# Patient Record
Sex: FEMALE | Race: BLACK OR AFRICAN AMERICAN | NOT HISPANIC OR LATINO | Employment: OTHER
[De-identification: names, ages, dates, MRNs, and addresses within clinical notes are randomized per-mention and may not be internally consistent; named-entity substitution may affect disease eponyms.]

---

## 2017-01-01 ENCOUNTER — HOSPITAL (OUTPATIENT)
Dept: OTHER | Age: 67
End: 2017-01-01
Attending: INTERNAL MEDICINE

## 2017-01-04 ENCOUNTER — LAB SERVICES (OUTPATIENT)
Dept: OTHER | Age: 67
End: 2017-01-04

## 2017-01-04 ENCOUNTER — CHARTING TRANS (OUTPATIENT)
Dept: OTHER | Age: 67
End: 2017-01-04

## 2017-01-04 ASSESSMENT — PAIN SCALES - GENERAL: PAINLEVEL_OUTOF10: 0

## 2017-01-05 ENCOUNTER — CHARTING TRANS (OUTPATIENT)
Dept: OTHER | Age: 67
End: 2017-01-05

## 2017-01-05 LAB
ALBUMIN SERPL-MCNC: 3.7 G/DL (ref 3.6–5.1)
ALBUMIN/GLOB SERPL: 0.9 (ref 1–2.4)
ALP SERPL-CCNC: 81 UNITS/L (ref 45–117)
ALT SERPL-CCNC: 27 UNITS/L
ANION GAP SERPL CALC-SCNC: 12 MMOL/L (ref 10–20)
AST SERPL-CCNC: 26 UNITS/L
BASOPHILS # BLD: 0 K/MCL (ref 0–0.3)
BASOPHILS NFR BLD: 0 %
BILIRUB SERPL-MCNC: 0.5 MG/DL (ref 0.2–1)
BUN SERPL-MCNC: 15 MG/DL (ref 10–20)
BUN/CREAT SERPL: 9 (ref 7–25)
CALCIUM SERPL-MCNC: 9.3 MG/DL (ref 8.4–10.2)
CHLORIDE SERPL-SCNC: 105 MMOL/L (ref 98–107)
CHOLEST SERPL-MCNC: 159 MG/DL (ref 100–200)
CHOLEST/HDLC SERPL: 3.1
CO2 SERPL-SCNC: 28 MMOL/L (ref 21–32)
CREAT SERPL-MCNC: 1.64 MG/DL (ref 0.51–0.95)
CREATININE RANDOM URINE: 139 MG/DL
CRP SERPL-MCNC: <0.3 MG/DL
DIFFERENTIAL METHOD BLD: ABNORMAL
EOSINOPHIL # BLD: 0.3 K/MCL (ref 0.1–0.5)
EOSINOPHIL NFR BLD: 5 %
ERYTHROCYTE [DISTWIDTH] IN BLOOD: 14.8 % (ref 11–15)
ERYTHROCYTE [SEDIMENTATION RATE] IN BLOOD BY WESTERGREN METHOD: 29 MM/HR (ref 0–20)
GLOBULIN SER-MCNC: 4.2 G/DL (ref 2–4)
GLUCOSE SERPL-MCNC: 111 MG/DL (ref 65–99)
HBA1C MFR BLD: 6.9 % (ref 4.5–5.6)
HDLC SERPL-MCNC: 52 MG/DL
HEMATOCRIT: 43.5 % (ref 36–46.5)
HEMOGLOBIN: 13.9 G/DL (ref 12–15.5)
LDLC SERPL CALC-MCNC: 83 MG/DL
LENGTH OF FAST TIME PATIENT: ABNORMAL HRS
LENGTH OF FAST TIME PATIENT: ABNORMAL HRS
LYMPHOCYTES # BLD: 2.1 K/MCL (ref 1–4)
LYMPHOCYTES NFR BLD: 37 %
MEAN CORPUSCULAR HEMOGLOBIN: 30.7 PG (ref 26–34)
MEAN CORPUSCULAR HGB CONC: 32 G/DL (ref 32–36.5)
MEAN CORPUSCULAR VOLUME: 96 FL (ref 78–100)
MICROALBUMIN UR-MCNC: 1.72 MG/DL
MICROALBUMIN/CREAT UR: 12.4 MCG/MG
MONOCYTES # BLD: 0.5 K/MCL (ref 0.3–0.9)
MONOCYTES NFR BLD: 8 %
NEUTROPHILS # BLD: 2.9 K/MCL (ref 1.8–7.7)
NEUTROPHILS NFR BLD: 50 %
NONHDLC SERPL-MCNC: 107 MG/DL
PLATELET COUNT: 216 K/MCL (ref 140–450)
POTASSIUM SERPL-SCNC: 4 MMOL/L (ref 3.4–5.1)
RED CELL COUNT: 4.53 MIL/MCL (ref 4–5.2)
SODIUM SERPL-SCNC: 141 MMOL/L (ref 135–145)
TOTAL PROTEIN: 7.9 G/DL (ref 6.4–8.2)
TRIGL SERPL-MCNC: 122 MG/DL
TSH SERPL-ACNC: 2.78 MCUNITS/ML (ref 0.35–5)
WHITE BLOOD COUNT: 5.8 K/MCL (ref 4.2–11)

## 2017-02-06 ENCOUNTER — HOSPITAL (OUTPATIENT)
Dept: OTHER | Age: 67
End: 2017-02-06
Attending: INTERNAL MEDICINE

## 2017-02-08 ENCOUNTER — CHARTING TRANS (OUTPATIENT)
Dept: OTHER | Age: 67
End: 2017-02-08

## 2017-02-08 ASSESSMENT — PAIN SCALES - GENERAL: PAINLEVEL_OUTOF10: 5

## 2017-03-01 ENCOUNTER — HOSPITAL (OUTPATIENT)
Dept: OTHER | Age: 67
End: 2017-03-01
Attending: INTERNAL MEDICINE

## 2017-04-03 ENCOUNTER — HOSPITAL (OUTPATIENT)
Dept: OTHER | Age: 67
End: 2017-04-03
Attending: INTERNAL MEDICINE

## 2017-05-01 ENCOUNTER — HOSPITAL (OUTPATIENT)
Dept: OTHER | Age: 67
End: 2017-05-01
Attending: INTERNAL MEDICINE

## 2017-05-10 ENCOUNTER — CHARTING TRANS (OUTPATIENT)
Dept: OTHER | Age: 67
End: 2017-05-10

## 2017-05-10 ENCOUNTER — LAB SERVICES (OUTPATIENT)
Dept: OTHER | Age: 67
End: 2017-05-10

## 2017-05-11 ENCOUNTER — CHARTING TRANS (OUTPATIENT)
Dept: OTHER | Age: 67
End: 2017-05-11

## 2017-05-11 LAB
ALBUMIN SERPL-MCNC: 3.9 G/DL (ref 3.6–5.1)
ALBUMIN/GLOB SERPL: 1.1 (ref 1–2.4)
ALP SERPL-CCNC: 78 UNITS/L (ref 45–117)
ALT SERPL-CCNC: 26 UNITS/L
ANION GAP SERPL CALC-SCNC: 12 MMOL/L (ref 10–20)
AST SERPL-CCNC: 20 UNITS/L
BASOPHILS # BLD: 0 K/MCL (ref 0–0.3)
BASOPHILS NFR BLD: 1 %
BILIRUB SERPL-MCNC: 0.5 MG/DL (ref 0.2–1)
BUN SERPL-MCNC: 20 MG/DL (ref 6–20)
BUN/CREAT SERPL: 13 (ref 7–25)
CALCIUM SERPL-MCNC: 8.8 MG/DL (ref 8.4–10.2)
CHLORIDE SERPL-SCNC: 106 MMOL/L (ref 98–107)
CHOLEST SERPL-MCNC: 133 MG/DL
CHOLEST/HDLC SERPL: 2.7
CO2 SERPL-SCNC: 28 MMOL/L (ref 21–32)
CREAT SERPL-MCNC: 1.5 MG/DL (ref 0.51–0.95)
CRP SERPL-MCNC: <0.3 MG/DL
DIFFERENTIAL METHOD BLD: ABNORMAL
EOSINOPHIL # BLD: 0.3 K/MCL (ref 0.1–0.5)
EOSINOPHIL NFR BLD: 5 %
ERYTHROCYTE [DISTWIDTH] IN BLOOD: 15.2 % (ref 11–15)
ERYTHROCYTE [SEDIMENTATION RATE] IN BLOOD BY WESTERGREN METHOD: 34 MM/HR (ref 0–20)
GLOBULIN SER-MCNC: 3.6 G/DL (ref 2–4)
GLUCOSE SERPL-MCNC: 71 MG/DL (ref 65–99)
HBA1C MFR BLD: 6.9 % (ref 4.5–5.6)
HDLC SERPL-MCNC: 49 MG/DL
HEMATOCRIT: 43.1 % (ref 36–46.5)
HEMOGLOBIN: 13.8 G/DL (ref 12–15.5)
LDLC SERPL CALC-MCNC: 66 MG/DL
LENGTH OF FAST TIME PATIENT: ABNORMAL HRS
LENGTH OF FAST TIME PATIENT: ABNORMAL HRS
LYMPHOCYTES # BLD: 1.9 K/MCL (ref 1–4)
LYMPHOCYTES NFR BLD: 35 %
MEAN CORPUSCULAR HEMOGLOBIN: 30.3 PG (ref 26–34)
MEAN CORPUSCULAR HGB CONC: 32 G/DL (ref 32–36.5)
MEAN CORPUSCULAR VOLUME: 94.5 FL (ref 78–100)
MONOCYTES # BLD: 0.8 K/MCL (ref 0.3–0.9)
MONOCYTES NFR BLD: 14 %
NEUTROPHILS # BLD: 2.5 K/MCL (ref 1.8–7.7)
NEUTROPHILS NFR BLD: 45 %
NONHDLC SERPL-MCNC: 84 MG/DL
PLATELET COUNT: 226 K/MCL (ref 140–450)
POTASSIUM SERPL-SCNC: 4.2 MMOL/L (ref 3.4–5.1)
RED CELL COUNT: 4.56 MIL/MCL (ref 4–5.2)
SODIUM SERPL-SCNC: 142 MMOL/L (ref 135–145)
TOTAL PROTEIN: 7.5 G/DL (ref 6.4–8.2)
TRIGL SERPL-MCNC: 92 MG/DL
TSH SERPL-ACNC: 2.67 MCUNITS/ML (ref 0.35–5)
WHITE BLOOD COUNT: 5.4 K/MCL (ref 4.2–11)

## 2017-07-25 ENCOUNTER — HOSPITAL (OUTPATIENT)
Dept: OTHER | Age: 67
End: 2017-07-25
Attending: INTERNAL MEDICINE

## 2017-09-19 ENCOUNTER — HOSPITAL (OUTPATIENT)
Dept: OTHER | Age: 67
End: 2017-09-19
Attending: INTERNAL MEDICINE

## 2017-10-04 ENCOUNTER — LAB SERVICES (OUTPATIENT)
Dept: OTHER | Age: 67
End: 2017-10-04

## 2017-10-04 ENCOUNTER — CHARTING TRANS (OUTPATIENT)
Dept: OTHER | Age: 67
End: 2017-10-04

## 2017-10-04 ASSESSMENT — PAIN SCALES - GENERAL: PAINLEVEL_OUTOF10: 0

## 2017-10-05 ENCOUNTER — CHARTING TRANS (OUTPATIENT)
Dept: OTHER | Age: 67
End: 2017-10-05

## 2017-10-05 LAB
ALBUMIN SERPL-MCNC: 3.7 G/DL (ref 3.6–5.1)
ALBUMIN/GLOB SERPL: 0.9 (ref 1–2.4)
ALP SERPL-CCNC: 84 UNITS/L (ref 45–117)
ALT SERPL-CCNC: 26 UNITS/L
ANION GAP SERPL CALC-SCNC: 11 MMOL/L (ref 10–20)
AST SERPL-CCNC: 17 UNITS/L
BASOPHILS # BLD: 0 K/MCL (ref 0–0.3)
BASOPHILS NFR BLD: 1 %
BILIRUB SERPL-MCNC: 0.5 MG/DL (ref 0.2–1)
BUN SERPL-MCNC: 21 MG/DL (ref 6–20)
BUN/CREAT SERPL: 14 (ref 7–25)
CALCIUM SERPL-MCNC: 8.7 MG/DL (ref 8.4–10.2)
CHLORIDE SERPL-SCNC: 106 MMOL/L (ref 98–107)
CHOLEST SERPL-MCNC: 137 MG/DL
CHOLEST/HDLC SERPL: 2.7
CO2 SERPL-SCNC: 29 MMOL/L (ref 21–32)
CREAT SERPL-MCNC: 1.56 MG/DL (ref 0.51–0.95)
CREATININE RANDOM URINE: 60.7 MG/DL
CRP SERPL-MCNC: <0.3 MG/DL
DIFFERENTIAL METHOD BLD: ABNORMAL
EOSINOPHIL # BLD: 0.3 K/MCL (ref 0.1–0.5)
EOSINOPHIL NFR BLD: 4 %
ERYTHROCYTE [DISTWIDTH] IN BLOOD: 15 % (ref 11–15)
ERYTHROCYTE [SEDIMENTATION RATE] IN BLOOD BY WESTERGREN METHOD: 35 MM/HR (ref 0–20)
GLOBULIN SER-MCNC: 4.1 G/DL (ref 2–4)
GLUCOSE SERPL-MCNC: 84 MG/DL (ref 65–99)
HBA1C MFR BLD: 6.7 % (ref 4.5–5.6)
HDLC SERPL-MCNC: 51 MG/DL
HEMATOCRIT: 43.3 % (ref 36–46.5)
HEMOGLOBIN: 14 G/DL (ref 12–15.5)
LDLC SERPL CALC-MCNC: 71 MG/DL
LENGTH OF FAST TIME PATIENT: ABNORMAL HRS
LENGTH OF FAST TIME PATIENT: ABNORMAL HRS
LYMPHOCYTES # BLD: 2.9 K/MCL (ref 1–4)
LYMPHOCYTES NFR BLD: 44 %
MEAN CORPUSCULAR HEMOGLOBIN: 30.6 PG (ref 26–34)
MEAN CORPUSCULAR HGB CONC: 32.3 G/DL (ref 32–36.5)
MEAN CORPUSCULAR VOLUME: 94.5 FL (ref 78–100)
MICROALBUMIN UR-MCNC: <0.5 MG/DL
MICROALBUMIN/CREAT UR: NORMAL MCG/MG
MONOCYTES # BLD: 0.5 K/MCL (ref 0.3–0.9)
MONOCYTES NFR BLD: 8 %
NEUTROPHILS # BLD: 2.9 K/MCL (ref 1.8–7.7)
NEUTROPHILS NFR BLD: 43 %
NONHDLC SERPL-MCNC: 86 MG/DL
PLATELET COUNT: 204 K/MCL (ref 140–450)
POTASSIUM SERPL-SCNC: 3.8 MMOL/L (ref 3.4–5.1)
RED CELL COUNT: 4.58 MIL/MCL (ref 4–5.2)
SODIUM SERPL-SCNC: 142 MMOL/L (ref 135–145)
TOTAL PROTEIN: 7.8 G/DL (ref 6.4–8.2)
TRIGL SERPL-MCNC: 74 MG/DL
TSH SERPL-ACNC: 2.25 MCUNITS/ML (ref 0.35–5)
WHITE BLOOD COUNT: 6.6 K/MCL (ref 4.2–11)

## 2017-11-14 ENCOUNTER — HOSPITAL (OUTPATIENT)
Dept: OTHER | Age: 67
End: 2017-11-14
Attending: INTERNAL MEDICINE

## 2017-11-15 ENCOUNTER — CHARTING TRANS (OUTPATIENT)
Dept: OTHER | Age: 67
End: 2017-11-15

## 2017-12-13 ENCOUNTER — OFFICE VISIT (OUTPATIENT)
Dept: NEPHROLOGY | Facility: CLINIC | Age: 67
End: 2017-12-13

## 2017-12-13 VITALS
HEIGHT: 69 IN | DIASTOLIC BLOOD PRESSURE: 66 MMHG | WEIGHT: 293 LBS | BODY MASS INDEX: 43.4 KG/M2 | HEART RATE: 80 BPM | SYSTOLIC BLOOD PRESSURE: 122 MMHG

## 2017-12-13 DIAGNOSIS — N18.30 CKD (CHRONIC KIDNEY DISEASE) STAGE 3, GFR 30-59 ML/MIN (HCC): Primary | ICD-10-CM

## 2017-12-13 PROCEDURE — 99212 OFFICE O/P EST SF 10 MIN: CPT | Performed by: INTERNAL MEDICINE

## 2017-12-13 PROCEDURE — 99245 OFF/OP CONSLTJ NEW/EST HI 55: CPT | Performed by: INTERNAL MEDICINE

## 2017-12-13 RX ORDER — MELATONIN
325 WEEKLY
COMMUNITY
End: 2021-11-19 | Stop reason: ALTCHOICE

## 2017-12-13 RX ORDER — FOLIC ACID 1 MG/1
1 TABLET ORAL DAILY
COMMUNITY
Start: 2017-11-19

## 2017-12-13 RX ORDER — DULAGLUTIDE 1.5 MG/.5ML
3 INJECTION, SOLUTION SUBCUTANEOUS WEEKLY
Refills: 3 | COMMUNITY
Start: 2017-11-16

## 2017-12-13 RX ORDER — INSULIN DEGLUDEC 200 U/ML
300 INJECTION, SOLUTION SUBCUTANEOUS DAILY
COMMUNITY
Start: 2017-09-27

## 2017-12-13 RX ORDER — FUROSEMIDE 20 MG/1
TABLET ORAL
Refills: 0 | COMMUNITY
Start: 2017-10-20

## 2017-12-13 RX ORDER — VALSARTAN AND HYDROCHLOROTHIAZIDE 320; 12.5 MG/1; MG/1
TABLET, FILM COATED ORAL
Refills: 0 | COMMUNITY
Start: 2017-10-20

## 2017-12-13 RX ORDER — LANCETS
EACH MISCELLANEOUS
COMMUNITY
Start: 2017-10-04

## 2017-12-13 RX ORDER — GLIMEPIRIDE 4 MG/1
TABLET ORAL
COMMUNITY
Start: 2017-11-20

## 2017-12-13 RX ORDER — METOPROLOL SUCCINATE 50 MG/1
75 TABLET, EXTENDED RELEASE ORAL DAILY
COMMUNITY
Start: 2017-11-10

## 2017-12-13 RX ORDER — OMEPRAZOLE 20 MG/1
20 CAPSULE, DELAYED RELEASE ORAL
COMMUNITY
End: 2021-11-19

## 2017-12-13 RX ORDER — SIMVASTATIN 40 MG
40 TABLET ORAL NIGHTLY
COMMUNITY
Start: 2017-09-26 | End: 2021-11-19 | Stop reason: ALTCHOICE

## 2017-12-13 RX ORDER — TERAZOSIN 10 MG/1
10 CAPSULE ORAL NIGHTLY
COMMUNITY
Start: 2017-11-10

## 2017-12-13 RX ORDER — DOXEPIN HYDROCHLORIDE 50 MG/G
CREAM TOPICAL
COMMUNITY
Start: 2017-11-30

## 2017-12-13 RX ORDER — AMOXICILLIN 500 MG/1
4 CAPSULE ORAL AS NEEDED
COMMUNITY
Start: 2017-09-27

## 2017-12-15 PROBLEM — N18.30 CKD (CHRONIC KIDNEY DISEASE) STAGE 3, GFR 30-59 ML/MIN (HCC): Status: ACTIVE | Noted: 2017-12-15

## 2017-12-16 NOTE — PROGRESS NOTES
12/15/17        Patient: Carl Levi   YOB: 1950   Date of Visit: 12/13/2017       Dear  Dr. Kriss Paz,      Thank you for referring Carl Levi to my practice. Please find my assessment and plan below.       He has you know she is a 67-yea urine microalbumin to creatinine ratio was normal as well. The patient is a 82-year-old female who appears to have chronic kidney disease stage III.   She does not have proteinuria and therefore suspect that hypertension and nephrosclerosis are the cause

## 2017-12-20 ENCOUNTER — LAB ENCOUNTER (OUTPATIENT)
Dept: LAB | Facility: HOSPITAL | Age: 67
End: 2017-12-20
Attending: INTERNAL MEDICINE
Payer: COMMERCIAL

## 2017-12-20 ENCOUNTER — HOSPITAL ENCOUNTER (OUTPATIENT)
Dept: ULTRASOUND IMAGING | Facility: HOSPITAL | Age: 67
Discharge: HOME | End: 2017-12-20
Attending: INTERNAL MEDICINE
Payer: COMMERCIAL

## 2017-12-20 DIAGNOSIS — N18.30 CKD (CHRONIC KIDNEY DISEASE) STAGE 3, GFR 30-59 ML/MIN (HCC): ICD-10-CM

## 2017-12-20 PROCEDURE — 84166 PROTEIN E-PHORESIS/URINE/CSF: CPT

## 2017-12-20 PROCEDURE — 76770 US EXAM ABDO BACK WALL COMP: CPT | Performed by: INTERNAL MEDICINE

## 2017-12-20 PROCEDURE — 82570 ASSAY OF URINE CREATININE: CPT

## 2017-12-20 PROCEDURE — 36415 COLL VENOUS BLD VENIPUNCTURE: CPT

## 2017-12-20 PROCEDURE — 85025 COMPLETE CBC W/AUTO DIFF WBC: CPT

## 2017-12-20 PROCEDURE — 82043 UR ALBUMIN QUANTITATIVE: CPT

## 2017-12-20 PROCEDURE — 86335 IMMUNFIX E-PHORSIS/URINE/CSF: CPT

## 2017-12-20 PROCEDURE — 81001 URINALYSIS AUTO W/SCOPE: CPT

## 2017-12-20 PROCEDURE — 84156 ASSAY OF PROTEIN URINE: CPT

## 2017-12-22 ENCOUNTER — APPOINTMENT (OUTPATIENT)
Dept: LAB | Facility: HOSPITAL | Age: 67
End: 2017-12-22
Attending: INTERNAL MEDICINE
Payer: COMMERCIAL

## 2017-12-22 PROCEDURE — 36415 COLL VENOUS BLD VENIPUNCTURE: CPT

## 2017-12-22 PROCEDURE — 80069 RENAL FUNCTION PANEL: CPT

## 2017-12-23 ENCOUNTER — TELEPHONE (OUTPATIENT)
Dept: NEPHROLOGY | Facility: CLINIC | Age: 67
End: 2017-12-23

## 2017-12-23 DIAGNOSIS — N18.30 CHRONIC KIDNEY DISEASE, STAGE III (MODERATE) (HCC): Primary | ICD-10-CM

## 2017-12-26 NOTE — TELEPHONE ENCOUNTER
Contacted pt. Notified her that [facility] received labs and ultrasound results and would like to see her soon to review. Pt states she will get her schedule and call our office back to schedule an appt.

## 2017-12-28 NOTE — TELEPHONE ENCOUNTER
Contacted pt. States it's difficult for her to arrange rides and she rather not have to come in just for lab results. Notified her that her kidney function is stable but it is abnormal, which is secondary to HTN.  Explained this will require regular follow

## 2017-12-28 NOTE — TELEPHONE ENCOUNTER
Why?  Ideally should come in. If she cannot let her know her kidney function is stable but abnormal.  This is secondary to hypertension. This needs to be followed on a regular basis. Repeat CBC and renal panel in 3 months and then see.   Make this a don

## 2018-01-09 ENCOUNTER — HOSPITAL (OUTPATIENT)
Dept: OTHER | Age: 68
End: 2018-01-09
Attending: INTERNAL MEDICINE

## 2018-03-01 ENCOUNTER — HOSPITAL (OUTPATIENT)
Dept: OTHER | Age: 68
End: 2018-03-01
Attending: INTERNAL MEDICINE

## 2018-03-07 ENCOUNTER — CHARTING TRANS (OUTPATIENT)
Dept: OTHER | Age: 68
End: 2018-03-07

## 2018-03-07 ENCOUNTER — IMAGING SERVICES (OUTPATIENT)
Dept: OTHER | Age: 68
End: 2018-03-07

## 2018-03-07 ENCOUNTER — HOSPITAL (OUTPATIENT)
Dept: OTHER | Age: 68
End: 2018-03-07
Attending: INTERNAL MEDICINE

## 2018-03-07 ASSESSMENT — PAIN SCALES - GENERAL: PAINLEVEL_OUTOF10: 8

## 2018-04-25 ENCOUNTER — MYAURORA ACCOUNT LINK (OUTPATIENT)
Dept: OTHER | Age: 68
End: 2018-04-25

## 2018-04-25 ENCOUNTER — LAB SERVICES (OUTPATIENT)
Dept: OTHER | Age: 68
End: 2018-04-25

## 2018-04-25 ENCOUNTER — CHARTING TRANS (OUTPATIENT)
Dept: OTHER | Age: 68
End: 2018-04-25

## 2018-04-25 ASSESSMENT — PAIN SCALES - GENERAL: PAINLEVEL_OUTOF10: 7

## 2018-04-26 ENCOUNTER — CHARTING TRANS (OUTPATIENT)
Dept: OTHER | Age: 68
End: 2018-04-26

## 2018-04-26 LAB
ALBUMIN SERPL-MCNC: 3.8 G/DL (ref 3.6–5.1)
ALBUMIN/GLOB SERPL: 0.9 (ref 1–2.4)
ALP SERPL-CCNC: 90 UNITS/L (ref 45–117)
ALT SERPL-CCNC: 30 UNITS/L
ANION GAP SERPL CALC-SCNC: 16 MMOL/L (ref 10–20)
AST SERPL-CCNC: 26 UNITS/L
BASOPHILS # BLD: 0.1 K/MCL (ref 0–0.3)
BASOPHILS NFR BLD: 1 %
BILIRUB SERPL-MCNC: 0.5 MG/DL (ref 0.2–1)
BUN SERPL-MCNC: 18 MG/DL (ref 6–20)
BUN/CREAT SERPL: 12 (ref 7–25)
CALCIUM SERPL-MCNC: 8.7 MG/DL (ref 8.4–10.2)
CHLORIDE SERPL-SCNC: 105 MMOL/L (ref 98–107)
CHOLEST SERPL-MCNC: 134 MG/DL
CHOLEST/HDLC SERPL: 2.5
CO2 SERPL-SCNC: 27 MMOL/L (ref 21–32)
CREAT SERPL-MCNC: 1.56 MG/DL (ref 0.51–0.95)
CRP SERPL-MCNC: 0.5 MG/DL
DIFFERENTIAL METHOD BLD: ABNORMAL
EOSINOPHIL # BLD: 0.2 K/MCL (ref 0.1–0.5)
EOSINOPHIL NFR BLD: 4 %
ERYTHROCYTE [DISTWIDTH] IN BLOOD: 14.2 % (ref 11–15)
ERYTHROCYTE [SEDIMENTATION RATE] IN BLOOD BY WESTERGREN METHOD: 35 MM/HR (ref 0–20)
GLOBULIN SER-MCNC: 4.4 G/DL (ref 2–4)
GLUCOSE SERPL-MCNC: 101 MG/DL (ref 65–99)
HBA1C MFR BLD: 7.3 % (ref 4.5–5.6)
HDLC SERPL-MCNC: 53 MG/DL
HEMATOCRIT: 44.8 % (ref 36–46.5)
HEMOGLOBIN: 14.1 G/DL (ref 12–15.5)
IMM GRANULOCYTES # BLD AUTO: 0 K/MCL (ref 0–0.2)
IMM GRANULOCYTES NFR BLD: 0 %
LDLC SERPL CALC-MCNC: 63 MG/DL
LENGTH OF FAST TIME PATIENT: 0 HRS
LENGTH OF FAST TIME PATIENT: 0 HRS
LYMPHOCYTES # BLD: 1.9 K/MCL (ref 1–4)
LYMPHOCYTES NFR BLD: 35 %
MEAN CORPUSCULAR HEMOGLOBIN: 30.2 PG (ref 26–34)
MEAN CORPUSCULAR HGB CONC: 31.5 G/DL (ref 32–36.5)
MEAN CORPUSCULAR VOLUME: 95.9 FL (ref 78–100)
MONOCYTES # BLD: 0.6 K/MCL (ref 0.3–0.9)
MONOCYTES NFR BLD: 11 %
NEUTROPHILS # BLD: 2.7 K/MCL (ref 1.8–7.7)
NEUTROPHILS NFR BLD: 49 %
NONHDLC SERPL-MCNC: 81 MG/DL
NRBC (NRBCRE): 0 %
PLATELET COUNT: 199 K/MCL (ref 140–450)
POTASSIUM SERPL-SCNC: 4 MMOL/L (ref 3.4–5.1)
RED CELL COUNT: 4.67 MIL/MCL (ref 4–5.2)
SODIUM SERPL-SCNC: 144 MMOL/L (ref 135–145)
TOTAL PROTEIN: 8.2 G/DL (ref 6.4–8.2)
TRIGL SERPL-MCNC: 89 MG/DL
TSH SERPL-ACNC: 2.53 MCUNITS/ML (ref 0.35–5)
WHITE BLOOD COUNT: 5.4 K/MCL (ref 4.2–11)

## 2018-05-01 ENCOUNTER — HOSPITAL (OUTPATIENT)
Dept: OTHER | Age: 68
End: 2018-05-01
Attending: INTERNAL MEDICINE

## 2018-05-02 ENCOUNTER — CHARTING TRANS (OUTPATIENT)
Dept: OTHER | Age: 68
End: 2018-05-02

## 2018-05-02 ASSESSMENT — PAIN SCALES - GENERAL: PAINLEVEL_OUTOF10: 0

## 2018-05-09 ENCOUNTER — TELEPHONE (OUTPATIENT)
Dept: NEPHROLOGY | Facility: CLINIC | Age: 68
End: 2018-05-09

## 2018-05-09 DIAGNOSIS — N18.30 CHRONIC KIDNEY DISEASE, STAGE III (MODERATE) (HCC): Primary | ICD-10-CM

## 2018-05-09 NOTE — TELEPHONE ENCOUNTER
Contacted pt about labs from 4/25/18 that were faxed to [address]. Per [facility]: Labs show kidney function is stable. CPM. Repeat CBC and renal panel in 3 months. Patient notified. She uses an outside lab so I have entered renal panel as BMP and phosphorus.  Pt would

## 2018-05-09 NOTE — TELEPHONE ENCOUNTER
Patient contacted and advised to see Dr. Nora Shi 1-2 weeks before her knee surgery. Advised to call this office to schedule a Preop visit once she has a surgery date set up.

## 2018-06-01 ENCOUNTER — HOSPITAL (OUTPATIENT)
Dept: OTHER | Age: 68
End: 2018-06-01
Attending: INTERNAL MEDICINE

## 2018-08-10 ENCOUNTER — HOSPITAL (OUTPATIENT)
Dept: OTHER | Age: 68
End: 2018-08-10
Attending: INTERNAL MEDICINE

## 2018-08-22 ENCOUNTER — MYAURORA ACCOUNT LINK (OUTPATIENT)
Dept: OTHER | Age: 68
End: 2018-08-22

## 2018-08-22 ENCOUNTER — CHARTING TRANS (OUTPATIENT)
Dept: OTHER | Age: 68
End: 2018-08-22

## 2018-10-05 ENCOUNTER — CHARTING TRANS (OUTPATIENT)
Dept: OTHER | Age: 68
End: 2018-10-05

## 2018-10-11 ENCOUNTER — HOSPITAL (OUTPATIENT)
Dept: OTHER | Age: 68
End: 2018-10-11
Attending: INTERNAL MEDICINE

## 2018-11-01 VITALS
HEART RATE: 78 BPM | BODY MASS INDEX: 44.41 KG/M2 | TEMPERATURE: 97.2 F | OXYGEN SATURATION: 98 % | SYSTOLIC BLOOD PRESSURE: 136 MMHG | DIASTOLIC BLOOD PRESSURE: 74 MMHG | WEIGHT: 293 LBS | HEIGHT: 68 IN

## 2018-11-01 VITALS
SYSTOLIC BLOOD PRESSURE: 124 MMHG | RESPIRATION RATE: 18 BRPM | WEIGHT: 293 LBS | TEMPERATURE: 96.9 F | HEIGHT: 68 IN | HEART RATE: 76 BPM | BODY MASS INDEX: 44.41 KG/M2 | DIASTOLIC BLOOD PRESSURE: 80 MMHG

## 2018-11-01 VITALS
WEIGHT: 293 LBS | DIASTOLIC BLOOD PRESSURE: 76 MMHG | SYSTOLIC BLOOD PRESSURE: 118 MMHG | TEMPERATURE: 97.2 F | HEIGHT: 68 IN | BODY MASS INDEX: 44.41 KG/M2 | HEART RATE: 102 BPM

## 2018-11-02 VITALS
HEIGHT: 68 IN | BODY MASS INDEX: 44.41 KG/M2 | RESPIRATION RATE: 18 BRPM | DIASTOLIC BLOOD PRESSURE: 90 MMHG | HEART RATE: 78 BPM | WEIGHT: 293 LBS | SYSTOLIC BLOOD PRESSURE: 138 MMHG

## 2018-11-02 VITALS
DIASTOLIC BLOOD PRESSURE: 68 MMHG | BODY MASS INDEX: 44.41 KG/M2 | HEART RATE: 90 BPM | OXYGEN SATURATION: 97 % | HEIGHT: 68 IN | WEIGHT: 293 LBS | TEMPERATURE: 98.1 F | SYSTOLIC BLOOD PRESSURE: 108 MMHG

## 2018-11-03 VITALS
HEIGHT: 68 IN | HEART RATE: 80 BPM | BODY MASS INDEX: 44.41 KG/M2 | DIASTOLIC BLOOD PRESSURE: 78 MMHG | WEIGHT: 293 LBS | RESPIRATION RATE: 16 BRPM | SYSTOLIC BLOOD PRESSURE: 124 MMHG

## 2018-11-05 VITALS
SYSTOLIC BLOOD PRESSURE: 136 MMHG | HEART RATE: 37 BPM | HEIGHT: 68 IN | BODY MASS INDEX: 44.41 KG/M2 | WEIGHT: 293 LBS | DIASTOLIC BLOOD PRESSURE: 77 MMHG | TEMPERATURE: 99.1 F

## 2018-11-06 VITALS
DIASTOLIC BLOOD PRESSURE: 80 MMHG | WEIGHT: 293 LBS | HEIGHT: 68 IN | BODY MASS INDEX: 44.41 KG/M2 | TEMPERATURE: 96.2 F | OXYGEN SATURATION: 97 % | HEART RATE: 104 BPM | SYSTOLIC BLOOD PRESSURE: 124 MMHG

## 2018-11-07 ENCOUNTER — LAB SERVICES (OUTPATIENT)
Dept: OTHER | Age: 68
End: 2018-11-07

## 2018-11-07 ENCOUNTER — CHARTING TRANS (OUTPATIENT)
Dept: OTHER | Age: 68
End: 2018-11-07

## 2018-11-08 ENCOUNTER — CHARTING TRANS (OUTPATIENT)
Dept: OTHER | Age: 68
End: 2018-11-08

## 2018-11-08 LAB — HEMOCCULT STL QL: NEGATIVE

## 2018-11-27 VITALS
HEART RATE: 100 BPM | TEMPERATURE: 97.9 F | BODY MASS INDEX: 44.41 KG/M2 | HEIGHT: 68 IN | WEIGHT: 293 LBS | DIASTOLIC BLOOD PRESSURE: 80 MMHG | SYSTOLIC BLOOD PRESSURE: 126 MMHG

## 2018-12-01 ENCOUNTER — PRIOR ORIGINAL RECORDS (OUTPATIENT)
Dept: HEALTH INFORMATION MANAGEMENT | Facility: OTHER | Age: 68
End: 2018-12-01

## 2018-12-10 ENCOUNTER — HOSPITAL (OUTPATIENT)
Dept: OTHER | Age: 68
End: 2018-12-10
Attending: INTERNAL MEDICINE

## 2018-12-11 ENCOUNTER — OFFICE VISIT (OUTPATIENT)
Dept: NEPHROLOGY | Facility: CLINIC | Age: 68
End: 2018-12-11
Payer: COMMERCIAL

## 2018-12-11 VITALS
BODY MASS INDEX: 43.4 KG/M2 | WEIGHT: 293 LBS | HEIGHT: 69 IN | SYSTOLIC BLOOD PRESSURE: 113 MMHG | DIASTOLIC BLOOD PRESSURE: 75 MMHG | HEART RATE: 92 BPM

## 2018-12-11 DIAGNOSIS — N18.30 CHRONIC KIDNEY DISEASE, STAGE III (MODERATE) (HCC): Primary | ICD-10-CM

## 2018-12-11 PROCEDURE — 99214 OFFICE O/P EST MOD 30 MIN: CPT | Performed by: INTERNAL MEDICINE

## 2018-12-11 PROCEDURE — 99212 OFFICE O/P EST SF 10 MIN: CPT | Performed by: INTERNAL MEDICINE

## 2018-12-12 ENCOUNTER — APPOINTMENT (OUTPATIENT)
Dept: RHEUMATOLOGY | Age: 68
End: 2018-12-12

## 2018-12-12 NOTE — PROGRESS NOTES
12/11/18        Patient: Mitali Salas   YOB: 1950   Date of Visit: 12/11/2018       Dear  Dr. Marciano Cruz,      Thank you for referring Mitali Salas to my practice. Please find my assessment and plan below.       As you know she is a 68-year-ol institution. Names and phone numbers were given. Thank you again for allowing me to participate in the care of your patient. If you have any questions please feel free to call.            Sincerely,   Sven Kovacs MD   [address],

## 2018-12-12 NOTE — PATIENT INSTRUCTIONS
Please repeat your kidney blood test this month. Orders are in the computer. Establish yourself with a new primary care physician and rheumatology.

## 2018-12-21 ENCOUNTER — TELEPHONE (OUTPATIENT)
Dept: SCHEDULING | Age: 68
End: 2018-12-21

## 2018-12-21 RX ORDER — SIMVASTATIN 40 MG
40 TABLET ORAL NIGHTLY
Qty: 90 TABLET | Refills: 0 | Status: SHIPPED | OUTPATIENT
Start: 2018-12-21 | End: 2019-03-22 | Stop reason: SDUPTHER

## 2018-12-21 RX ORDER — SIMVASTATIN 40 MG
40 TABLET ORAL
COMMUNITY
Start: 2017-09-26 | End: 2018-12-21 | Stop reason: SDUPTHER

## 2018-12-28 ENCOUNTER — LAB ENCOUNTER (OUTPATIENT)
Dept: LAB | Facility: HOSPITAL | Age: 68
End: 2018-12-28
Attending: INTERNAL MEDICINE
Payer: COMMERCIAL

## 2018-12-28 DIAGNOSIS — E11.65 DIABETES MELLITUS WITH NEUROLOGICAL MANIFESTATIONS, UNCONTROLLED (HCC): Primary | ICD-10-CM

## 2018-12-28 DIAGNOSIS — N18.30 CHRONIC KIDNEY DISEASE, STAGE III (MODERATE) (HCC): ICD-10-CM

## 2018-12-28 DIAGNOSIS — E11.49 DIABETES MELLITUS WITH NEUROLOGICAL MANIFESTATIONS, UNCONTROLLED (HCC): Primary | ICD-10-CM

## 2018-12-28 PROCEDURE — 83036 HEMOGLOBIN GLYCOSYLATED A1C: CPT

## 2018-12-28 PROCEDURE — 85025 COMPLETE CBC W/AUTO DIFF WBC: CPT

## 2018-12-28 PROCEDURE — 36415 COLL VENOUS BLD VENIPUNCTURE: CPT

## 2018-12-28 PROCEDURE — 80069 RENAL FUNCTION PANEL: CPT

## 2018-12-31 ENCOUNTER — TELEPHONE (OUTPATIENT)
Dept: INTERNAL MEDICINE | Age: 68
End: 2018-12-31

## 2018-12-31 ENCOUNTER — TELEPHONE (OUTPATIENT)
Dept: SCHEDULING | Age: 68
End: 2018-12-31

## 2018-12-31 RX ORDER — PEN NEEDLE, DIABETIC 32GX 5/32"
NEEDLE, DISPOSABLE MISCELLANEOUS
Qty: 200 EACH | Refills: 0 | Status: SHIPPED | OUTPATIENT
Start: 2018-12-31 | End: 2018-12-31 | Stop reason: SDUPTHER

## 2019-01-01 ENCOUNTER — EXTERNAL RECORD (OUTPATIENT)
Dept: HEALTH INFORMATION MANAGEMENT | Facility: OTHER | Age: 69
End: 2019-01-01

## 2019-01-01 ENCOUNTER — HOSPITAL (OUTPATIENT)
Dept: OTHER | Age: 69
End: 2019-01-01
Attending: INTERNAL MEDICINE

## 2019-01-22 RX ORDER — VALSARTAN AND HYDROCHLOROTHIAZIDE 320; 12.5 MG/1; MG/1
1 TABLET, FILM COATED ORAL DAILY
COMMUNITY
End: 2019-02-04 | Stop reason: SDUPTHER

## 2019-01-22 RX ORDER — METHOTREXATE 2.5 MG/1
TABLET ORAL
COMMUNITY
End: 2019-01-23 | Stop reason: SDUPTHER

## 2019-01-22 RX ORDER — FUROSEMIDE 20 MG/1
TABLET ORAL
COMMUNITY
End: 2019-03-22 | Stop reason: SDUPTHER

## 2019-01-22 RX ORDER — TERAZOSIN 10 MG/1
CAPSULE ORAL
COMMUNITY
End: 2019-02-03 | Stop reason: SDUPTHER

## 2019-01-22 RX ORDER — METOPROLOL SUCCINATE 50 MG/1
TABLET, EXTENDED RELEASE ORAL
COMMUNITY
End: 2019-02-03 | Stop reason: SDUPTHER

## 2019-01-22 RX ORDER — GLIMEPIRIDE 4 MG/1
4 TABLET ORAL
COMMUNITY

## 2019-01-22 RX ORDER — LANCETS
EACH MISCELLANEOUS
COMMUNITY
End: 2020-02-18 | Stop reason: SDUPTHER

## 2019-01-22 RX ORDER — OMEPRAZOLE 20 MG/1
CAPSULE, DELAYED RELEASE ORAL
COMMUNITY
End: 2019-02-03 | Stop reason: SDUPTHER

## 2019-01-22 RX ORDER — FOLIC ACID 1 MG/1
TABLET ORAL
COMMUNITY
End: 2019-02-03 | Stop reason: SDUPTHER

## 2019-01-23 RX ORDER — METHOTREXATE 2.5 MG/1
TABLET ORAL
Qty: 72 TABLET | Refills: 0 | Status: SHIPPED | OUTPATIENT
Start: 2019-01-23 | End: 2019-05-29 | Stop reason: SDUPTHER

## 2019-01-24 RX ORDER — FUROSEMIDE 20 MG/1
TABLET ORAL
Qty: 90 TABLET | Refills: 0 | OUTPATIENT
Start: 2019-01-24

## 2019-02-04 ENCOUNTER — HOSPITAL (OUTPATIENT)
Dept: OTHER | Age: 69
End: 2019-02-04
Attending: INTERNAL MEDICINE

## 2019-02-04 RX ORDER — OMEPRAZOLE 20 MG/1
CAPSULE, DELAYED RELEASE ORAL
Qty: 30 CAPSULE | Refills: 0 | Status: SHIPPED | OUTPATIENT
Start: 2019-02-04 | End: 2019-06-19 | Stop reason: SDUPTHER

## 2019-02-04 RX ORDER — METOPROLOL SUCCINATE 50 MG/1
TABLET, EXTENDED RELEASE ORAL
Qty: 30 TABLET | Refills: 0 | Status: SHIPPED | OUTPATIENT
Start: 2019-02-04 | End: 2019-03-03 | Stop reason: SDUPTHER

## 2019-02-04 RX ORDER — FOLIC ACID 1 MG/1
TABLET ORAL
Qty: 30 TABLET | Refills: 0 | Status: SHIPPED | OUTPATIENT
Start: 2019-02-04 | End: 2019-03-03 | Stop reason: SDUPTHER

## 2019-02-04 RX ORDER — TERAZOSIN 10 MG/1
CAPSULE ORAL
Qty: 90 CAPSULE | Refills: 0 | Status: SHIPPED | OUTPATIENT
Start: 2019-02-04 | End: 2019-05-01 | Stop reason: SDUPTHER

## 2019-02-04 RX ORDER — VALSARTAN AND HYDROCHLOROTHIAZIDE 320; 12.5 MG/1; MG/1
1 TABLET, FILM COATED ORAL DAILY
Qty: 30 TABLET | Refills: 0 | Status: SHIPPED | OUTPATIENT
Start: 2019-02-04 | End: 2019-03-02 | Stop reason: SDUPTHER

## 2019-02-06 ENCOUNTER — APPOINTMENT (OUTPATIENT)
Dept: RHEUMATOLOGY | Age: 69
End: 2019-02-06

## 2019-02-25 ENCOUNTER — TELEPHONE (OUTPATIENT)
Dept: SCHEDULING | Age: 69
End: 2019-02-25

## 2019-03-04 RX ORDER — VALSARTAN AND HYDROCHLOROTHIAZIDE 320; 12.5 MG/1; MG/1
1 TABLET, FILM COATED ORAL DAILY
Qty: 30 TABLET | Refills: 0 | Status: SHIPPED | OUTPATIENT
Start: 2019-03-04 | End: 2019-04-04 | Stop reason: SDUPTHER

## 2019-03-04 RX ORDER — METOPROLOL SUCCINATE 50 MG/1
TABLET, EXTENDED RELEASE ORAL
Qty: 30 TABLET | Refills: 0 | Status: SHIPPED | OUTPATIENT
Start: 2019-03-04 | End: 2019-04-17 | Stop reason: SDUPTHER

## 2019-03-04 RX ORDER — FOLIC ACID 1 MG/1
TABLET ORAL
Qty: 30 TABLET | Refills: 0 | Status: SHIPPED | OUTPATIENT
Start: 2019-03-04 | End: 2019-04-04 | Stop reason: SDUPTHER

## 2019-03-04 RX ORDER — OMEPRAZOLE 20 MG/1
CAPSULE, DELAYED RELEASE ORAL
Qty: 30 CAPSULE | Refills: 0 | OUTPATIENT
Start: 2019-03-04

## 2019-03-06 ENCOUNTER — APPOINTMENT (OUTPATIENT)
Dept: FAMILY MEDICINE | Age: 69
End: 2019-03-06

## 2019-03-06 ENCOUNTER — TELEPHONE (OUTPATIENT)
Dept: SCHEDULING | Age: 69
End: 2019-03-06

## 2019-03-08 ENCOUNTER — TELEPHONE (OUTPATIENT)
Dept: SCHEDULING | Age: 69
End: 2019-03-08

## 2019-03-13 ENCOUNTER — OFFICE VISIT (OUTPATIENT)
Dept: FAMILY MEDICINE | Age: 69
End: 2019-03-13

## 2019-03-13 DIAGNOSIS — E78.5 HYPERLIPIDEMIA, UNSPECIFIED HYPERLIPIDEMIA TYPE: ICD-10-CM

## 2019-03-13 DIAGNOSIS — N18.30 CONTROLLED TYPE 2 DIABETES MELLITUS WITH STAGE 3 CHRONIC KIDNEY DISEASE, WITH LONG-TERM CURRENT USE OF INSULIN (CMD): ICD-10-CM

## 2019-03-13 DIAGNOSIS — Z00.00 PREVENTATIVE HEALTH CARE: Primary | ICD-10-CM

## 2019-03-13 DIAGNOSIS — E11.22 CONTROLLED TYPE 2 DIABETES MELLITUS WITH STAGE 3 CHRONIC KIDNEY DISEASE, WITH LONG-TERM CURRENT USE OF INSULIN (CMD): ICD-10-CM

## 2019-03-13 DIAGNOSIS — I10 BENIGN ESSENTIAL HTN: ICD-10-CM

## 2019-03-13 DIAGNOSIS — M06.9 RHEUMATOID ARTHRITIS, INVOLVING UNSPECIFIED SITE, UNSPECIFIED RHEUMATOID FACTOR PRESENCE: ICD-10-CM

## 2019-03-13 DIAGNOSIS — K21.9 GASTROESOPHAGEAL REFLUX DISEASE WITHOUT ESOPHAGITIS: ICD-10-CM

## 2019-03-13 DIAGNOSIS — Z79.4 CONTROLLED TYPE 2 DIABETES MELLITUS WITH STAGE 3 CHRONIC KIDNEY DISEASE, WITH LONG-TERM CURRENT USE OF INSULIN (CMD): ICD-10-CM

## 2019-03-13 PROCEDURE — 99214 OFFICE O/P EST MOD 30 MIN: CPT | Performed by: FAMILY MEDICINE

## 2019-03-13 PROCEDURE — 3078F DIAST BP <80 MM HG: CPT | Performed by: FAMILY MEDICINE

## 2019-03-13 PROCEDURE — 3074F SYST BP LT 130 MM HG: CPT | Performed by: FAMILY MEDICINE

## 2019-03-13 RX ORDER — RANITIDINE 150 MG/1
150 TABLET ORAL 2 TIMES DAILY
Qty: 60 TABLET | Refills: 2 | Status: SHIPPED | OUTPATIENT
Start: 2019-03-13 | End: 2019-06-19 | Stop reason: ALTCHOICE

## 2019-03-13 SDOH — HEALTH STABILITY: MENTAL HEALTH: HOW OFTEN DO YOU HAVE A DRINK CONTAINING ALCOHOL?: NEVER

## 2019-03-13 ASSESSMENT — PATIENT HEALTH QUESTIONNAIRE - PHQ9
SUM OF ALL RESPONSES TO PHQ9 QUESTIONS 1 AND 2: 0
2. FEELING DOWN, DEPRESSED OR HOPELESS: NOT AT ALL
1. LITTLE INTEREST OR PLEASURE IN DOING THINGS: NOT AT ALL
SUM OF ALL RESPONSES TO PHQ9 QUESTIONS 1 AND 2: 0

## 2019-03-13 ASSESSMENT — PAIN SCALES - GENERAL: PAINLEVEL: 3-4

## 2019-03-18 PROBLEM — E78.5 HLD (HYPERLIPIDEMIA): Status: ACTIVE | Noted: 2018-08-16

## 2019-03-23 RX ORDER — SIMVASTATIN 40 MG
TABLET ORAL
Qty: 90 TABLET | Refills: 0 | Status: SHIPPED | OUTPATIENT
Start: 2019-03-23 | End: 2019-06-17 | Stop reason: SDUPTHER

## 2019-03-23 RX ORDER — FUROSEMIDE 20 MG/1
TABLET ORAL
Qty: 90 TABLET | Refills: 0 | Status: SHIPPED | OUTPATIENT
Start: 2019-03-23 | End: 2019-05-30 | Stop reason: SDUPTHER

## 2019-04-01 ENCOUNTER — HOSPITAL (OUTPATIENT)
Dept: OTHER | Age: 69
End: 2019-04-01
Attending: INTERNAL MEDICINE

## 2019-04-03 ENCOUNTER — APPOINTMENT (OUTPATIENT)
Dept: RHEUMATOLOGY | Age: 69
End: 2019-04-03

## 2019-04-05 RX ORDER — VALSARTAN AND HYDROCHLOROTHIAZIDE 320; 12.5 MG/1; MG/1
1 TABLET, FILM COATED ORAL DAILY
Qty: 30 TABLET | Refills: 6 | Status: SHIPPED | OUTPATIENT
Start: 2019-04-05 | End: 2019-10-24 | Stop reason: SDUPTHER

## 2019-04-05 RX ORDER — FOLIC ACID 1 MG/1
TABLET ORAL
Qty: 30 TABLET | Refills: 6 | Status: SHIPPED | OUTPATIENT
Start: 2019-04-05 | End: 2019-10-24 | Stop reason: SDUPTHER

## 2019-04-17 ENCOUNTER — TELEPHONE (OUTPATIENT)
Dept: SCHEDULING | Age: 69
End: 2019-04-17

## 2019-04-17 RX ORDER — METOPROLOL SUCCINATE 50 MG/1
TABLET, EXTENDED RELEASE ORAL
Qty: 30 TABLET | Refills: 3 | Status: SHIPPED | OUTPATIENT
Start: 2019-04-17 | End: 2019-04-18 | Stop reason: SDUPTHER

## 2019-04-18 RX ORDER — METOPROLOL SUCCINATE 50 MG/1
50 TABLET, EXTENDED RELEASE ORAL DAILY
Qty: 90 TABLET | Refills: 2 | Status: SHIPPED | OUTPATIENT
Start: 2019-04-18 | End: 2019-08-30 | Stop reason: SDUPTHER

## 2019-04-25 ENCOUNTER — LAB SERVICES (OUTPATIENT)
Dept: INTERNAL MEDICINE | Age: 69
End: 2019-04-25

## 2019-04-25 PROCEDURE — 80050 GENERAL HEALTH PANEL: CPT | Performed by: FAMILY MEDICINE

## 2019-04-25 PROCEDURE — 86140 C-REACTIVE PROTEIN: CPT | Performed by: FAMILY MEDICINE

## 2019-04-25 PROCEDURE — 82306 VITAMIN D 25 HYDROXY: CPT | Performed by: FAMILY MEDICINE

## 2019-04-25 PROCEDURE — 80061 LIPID PANEL: CPT | Performed by: FAMILY MEDICINE

## 2019-04-25 PROCEDURE — 36415 COLL VENOUS BLD VENIPUNCTURE: CPT | Performed by: FAMILY MEDICINE

## 2019-04-25 PROCEDURE — 86803 HEPATITIS C AB TEST: CPT | Performed by: FAMILY MEDICINE

## 2019-04-25 PROCEDURE — 85652 RBC SED RATE AUTOMATED: CPT | Performed by: FAMILY MEDICINE

## 2019-04-25 PROCEDURE — 83036 HEMOGLOBIN GLYCOSYLATED A1C: CPT | Performed by: FAMILY MEDICINE

## 2019-04-26 ENCOUNTER — LAB SERVICES (OUTPATIENT)
Dept: LAB | Age: 69
End: 2019-04-26

## 2019-04-26 DIAGNOSIS — Z00.00 PREVENTATIVE HEALTH CARE: ICD-10-CM

## 2019-04-26 LAB
25(OH)D3+25(OH)D2 SERPL-MCNC: 38 NG/ML (ref 30–100)
ALBUMIN SERPL-MCNC: 3.8 G/DL (ref 3.6–5.1)
ALBUMIN/GLOB SERPL: 0.8 {RATIO} (ref 1–2.4)
ALP SERPL-CCNC: 81 UNITS/L (ref 45–117)
ALT SERPL-CCNC: 28 UNITS/L
ANION GAP SERPL CALC-SCNC: 11 MMOL/L (ref 10–20)
AST SERPL-CCNC: 22 UNITS/L
BASOPHILS # BLD AUTO: 0.1 K/MCL (ref 0–0.3)
BASOPHILS NFR BLD AUTO: 1 %
BILIRUB SERPL-MCNC: 0.6 MG/DL (ref 0.2–1)
BUN SERPL-MCNC: 21 MG/DL (ref 6–20)
BUN/CREAT SERPL: 13 (ref 7–25)
CALCIUM SERPL-MCNC: 9.1 MG/DL (ref 8.4–10.2)
CHLORIDE SERPL-SCNC: 106 MMOL/L (ref 98–107)
CHOLEST SERPL-MCNC: 163 MG/DL
CHOLEST/HDLC SERPL: 3.1 {RATIO}
CO2 SERPL-SCNC: 29 MMOL/L (ref 21–32)
CREAT SERPL-MCNC: 1.65 MG/DL (ref 0.51–0.95)
CREAT UR-MCNC: 158 MG/DL
CRP SERPL-MCNC: 0.4 MG/DL
DIFFERENTIAL METHOD BLD: ABNORMAL
EOSINOPHIL # BLD AUTO: 0.2 K/MCL (ref 0.1–0.5)
EOSINOPHIL NFR SPEC: 4 %
ERYTHROCYTE [DISTWIDTH] IN BLOOD: 15.4 % (ref 11–15)
ERYTHROCYTE [SEDIMENTATION RATE] IN BLOOD: 37 MM/HR (ref 0–20)
FASTING STATUS PATIENT QL REPORTED: ABNORMAL HRS
GLOBULIN SER-MCNC: 4.5 G/DL (ref 2–4)
GLUCOSE SERPL-MCNC: 74 MG/DL (ref 65–99)
HBA1C MFR BLD: 6.4 % (ref 4.5–5.6)
HCT VFR BLD CALC: 46.7 % (ref 36–46.5)
HCV AB SER QL: NEGATIVE
HDLC SERPL-MCNC: 52 MG/DL
HGB BLD-MCNC: 14.7 G/DL (ref 12–15.5)
IMM GRANULOCYTES # BLD AUTO: 0 K/MCL (ref 0–0.2)
IMM GRANULOCYTES NFR BLD: 0 %
LDLC SERPL-MCNC: 92 MG/DL
LENGTH OF FAST TIME PATIENT: NORMAL HRS
LYMPHOCYTES # BLD MANUAL: 2.2 K/MCL (ref 1–4)
LYMPHOCYTES NFR BLD MANUAL: 40 %
MCH RBC QN AUTO: 30.7 PG (ref 26–34)
MCHC RBC AUTO-ENTMCNC: 31.5 G/DL (ref 32–36.5)
MCV RBC AUTO: 97.5 FL (ref 78–100)
MICROALBUMIN UR-MCNC: 1.89 MG/DL
MICROALBUMIN/CREAT UR: 12 MG/G
MONOCYTES # BLD MANUAL: 0.5 K/MCL (ref 0.3–0.9)
MONOCYTES NFR BLD MANUAL: 9 %
NEUTROPHILS # BLD: 2.5 K/MCL (ref 1.8–7.7)
NEUTROPHILS NFR BLD AUTO: 46 %
NONHDLC SERPL-MCNC: 111 MG/DL
NRBC BLD MANUAL-RTO: 0 /100 WBC
PLATELET # BLD: 213 K/MCL (ref 140–450)
POTASSIUM SERPL-SCNC: 4 MMOL/L (ref 3.4–5.1)
PROT SERPL-MCNC: 8.3 G/DL (ref 6.4–8.2)
RBC # BLD: 4.79 MIL/MCL (ref 4–5.2)
SODIUM SERPL-SCNC: 142 MMOL/L (ref 135–145)
TRIGL SERPL-MCNC: 94 MG/DL
TSH SERPL-ACNC: 2.54 MCUNITS/ML (ref 0.35–5)
WBC # BLD: 5.5 K/MCL (ref 4.2–11)

## 2019-04-29 ENCOUNTER — TELEPHONE (OUTPATIENT)
Dept: FAMILY MEDICINE | Age: 69
End: 2019-04-29

## 2019-05-01 ENCOUNTER — HOSPITAL (OUTPATIENT)
Dept: OTHER | Age: 69
End: 2019-05-01
Attending: INTERNAL MEDICINE

## 2019-05-01 RX ORDER — TERAZOSIN 10 MG/1
CAPSULE ORAL
Qty: 90 CAPSULE | Refills: 0 | Status: SHIPPED | OUTPATIENT
Start: 2019-05-01 | End: 2019-07-30 | Stop reason: SDUPTHER

## 2019-05-29 ENCOUNTER — APPOINTMENT (OUTPATIENT)
Dept: RHEUMATOLOGY | Age: 69
End: 2019-05-29

## 2019-05-29 DIAGNOSIS — M06.9 RHEUMATOID ARTHRITIS, INVOLVING UNSPECIFIED SITE, UNSPECIFIED RHEUMATOID FACTOR PRESENCE: Primary | ICD-10-CM

## 2019-05-29 RX ORDER — METHOTREXATE 2.5 MG/1
TABLET ORAL
Qty: 72 TABLET | Refills: 0 | Status: SHIPPED | OUTPATIENT
Start: 2019-05-29 | End: 2019-10-07 | Stop reason: SDUPTHER

## 2019-05-30 RX ORDER — FUROSEMIDE 20 MG/1
TABLET ORAL
Qty: 90 TABLET | Refills: 0 | Status: SHIPPED | OUTPATIENT
Start: 2019-05-30 | End: 2019-08-14 | Stop reason: SDUPTHER

## 2019-05-31 ENCOUNTER — TELEPHONE (OUTPATIENT)
Dept: SCHEDULING | Age: 69
End: 2019-05-31

## 2019-06-01 ENCOUNTER — HOSPITAL (OUTPATIENT)
Dept: OTHER | Age: 69
End: 2019-06-01
Attending: INTERNAL MEDICINE

## 2019-06-11 ENCOUNTER — TELEPHONE (OUTPATIENT)
Dept: SCHEDULING | Age: 69
End: 2019-06-11

## 2019-06-12 ENCOUNTER — TELEPHONE (OUTPATIENT)
Dept: SCHEDULING | Age: 69
End: 2019-06-12

## 2019-06-16 ENCOUNTER — TELEPHONE (OUTPATIENT)
Dept: SCHEDULING | Age: 69
End: 2019-06-16

## 2019-06-17 RX ORDER — SIMVASTATIN 40 MG
TABLET ORAL
Qty: 90 TABLET | Refills: 0 | Status: SHIPPED | OUTPATIENT
Start: 2019-06-17 | End: 2019-09-13 | Stop reason: SDUPTHER

## 2019-06-19 ENCOUNTER — OFFICE VISIT (OUTPATIENT)
Dept: FAMILY MEDICINE | Age: 69
End: 2019-06-19

## 2019-06-19 DIAGNOSIS — J06.9 VIRAL URI: Primary | ICD-10-CM

## 2019-06-19 DIAGNOSIS — K21.00 GASTROESOPHAGEAL REFLUX DISEASE WITH ESOPHAGITIS: ICD-10-CM

## 2019-06-19 DIAGNOSIS — K59.00 CONSTIPATION, UNSPECIFIED CONSTIPATION TYPE: ICD-10-CM

## 2019-06-19 PROCEDURE — 99214 OFFICE O/P EST MOD 30 MIN: CPT | Performed by: FAMILY MEDICINE

## 2019-06-19 PROCEDURE — 3074F SYST BP LT 130 MM HG: CPT | Performed by: FAMILY MEDICINE

## 2019-06-19 PROCEDURE — 3078F DIAST BP <80 MM HG: CPT | Performed by: FAMILY MEDICINE

## 2019-06-19 RX ORDER — OMEPRAZOLE 20 MG/1
20 CAPSULE, DELAYED RELEASE ORAL DAILY
Qty: 30 CAPSULE | Refills: 2 | Status: SHIPPED | OUTPATIENT
Start: 2019-06-19 | End: 2019-09-09 | Stop reason: SDUPTHER

## 2019-06-19 RX ORDER — FLUTICASONE PROPIONATE 50 MCG
1 SPRAY, SUSPENSION (ML) NASAL DAILY
Qty: 16 G | Refills: 1 | Status: SHIPPED | OUTPATIENT
Start: 2019-06-19 | End: 2019-11-14 | Stop reason: ALTCHOICE

## 2019-06-19 SDOH — HEALTH STABILITY: MENTAL HEALTH: HOW OFTEN DO YOU HAVE A DRINK CONTAINING ALCOHOL?: NEVER

## 2019-06-19 ASSESSMENT — PAIN SCALES - GENERAL: PAINLEVEL: 0

## 2019-07-12 ENCOUNTER — HOSPITAL (OUTPATIENT)
Dept: OTHER | Age: 69
End: 2019-07-12
Attending: INTERNAL MEDICINE

## 2019-07-24 ENCOUNTER — APPOINTMENT (OUTPATIENT)
Dept: RHEUMATOLOGY | Age: 69
End: 2019-07-24

## 2019-07-31 RX ORDER — TERAZOSIN 10 MG/1
CAPSULE ORAL
Qty: 90 CAPSULE | Refills: 0 | Status: SHIPPED | OUTPATIENT
Start: 2019-07-31 | End: 2019-10-25 | Stop reason: SDUPTHER

## 2019-08-01 ENCOUNTER — HOSPITAL (OUTPATIENT)
Dept: OTHER | Age: 69
End: 2019-08-01
Attending: INTERNAL MEDICINE

## 2019-08-14 RX ORDER — FUROSEMIDE 20 MG/1
TABLET ORAL
Qty: 90 TABLET | Refills: 0 | Status: SHIPPED | OUTPATIENT
Start: 2019-08-14 | End: 2019-11-14 | Stop reason: SDUPTHER

## 2019-08-28 ENCOUNTER — LAB ENCOUNTER (OUTPATIENT)
Dept: LAB | Facility: HOSPITAL | Age: 69
End: 2019-08-28
Attending: INTERNAL MEDICINE
Payer: COMMERCIAL

## 2019-08-28 DIAGNOSIS — E11.40 DIABETIC NEUROPATHY WITH NEUROLOGIC COMPLICATION (HCC): Primary | ICD-10-CM

## 2019-08-28 DIAGNOSIS — N18.30 CHRONIC KIDNEY DISEASE, STAGE III (MODERATE) (HCC): ICD-10-CM

## 2019-08-28 DIAGNOSIS — E11.49 DIABETIC NEUROPATHY WITH NEUROLOGIC COMPLICATION (HCC): Primary | ICD-10-CM

## 2019-08-28 LAB
ALBUMIN SERPL-MCNC: 3.6 G/DL (ref 3.4–5)
ANION GAP SERPL CALC-SCNC: 7 MMOL/L (ref 0–18)
BASOPHILS # BLD AUTO: 0.04 X10(3) UL (ref 0–0.2)
BASOPHILS NFR BLD AUTO: 0.7 %
BUN BLD-MCNC: 17 MG/DL (ref 7–18)
BUN/CREAT SERPL: 11 (ref 10–20)
CALCIUM BLD-MCNC: 8.8 MG/DL (ref 8.5–10.1)
CHLORIDE SERPL-SCNC: 106 MMOL/L (ref 98–112)
CO2 SERPL-SCNC: 28 MMOL/L (ref 21–32)
CREAT BLD-MCNC: 1.55 MG/DL (ref 0.55–1.02)
DEPRECATED RDW RBC AUTO: 50.7 FL (ref 35.1–46.3)
EOSINOPHIL # BLD AUTO: 0.3 X10(3) UL (ref 0–0.7)
EOSINOPHIL NFR BLD AUTO: 5.2 %
ERYTHROCYTE [DISTWIDTH] IN BLOOD BY AUTOMATED COUNT: 14.6 % (ref 11–15)
EST. AVERAGE GLUCOSE BLD GHB EST-MCNC: 137 MG/DL (ref 68–126)
GLUCOSE BLD-MCNC: 115 MG/DL (ref 70–99)
HBA1C MFR BLD HPLC: 6.4 % (ref ?–5.7)
HCT VFR BLD AUTO: 44.4 % (ref 35–48)
HGB BLD-MCNC: 14.1 G/DL (ref 12–16)
IMM GRANULOCYTES # BLD AUTO: 0.01 X10(3) UL (ref 0–1)
IMM GRANULOCYTES NFR BLD: 0.2 %
LYMPHOCYTES # BLD AUTO: 2.26 X10(3) UL (ref 1–4)
LYMPHOCYTES NFR BLD AUTO: 39.2 %
MCH RBC QN AUTO: 30.4 PG (ref 26–34)
MCHC RBC AUTO-ENTMCNC: 31.8 G/DL (ref 31–37)
MCV RBC AUTO: 95.7 FL (ref 80–100)
MONOCYTES # BLD AUTO: 0.64 X10(3) UL (ref 0.1–1)
MONOCYTES NFR BLD AUTO: 11.1 %
NEUTROPHILS # BLD AUTO: 2.51 X10 (3) UL (ref 1.5–7.7)
NEUTROPHILS # BLD AUTO: 2.51 X10(3) UL (ref 1.5–7.7)
NEUTROPHILS NFR BLD AUTO: 43.6 %
OSMOLALITY SERPL CALC.SUM OF ELEC: 294 MOSM/KG (ref 275–295)
PHOSPHATE SERPL-MCNC: 3.2 MG/DL (ref 2.5–4.9)
PLATELET # BLD AUTO: 210 10(3)UL (ref 150–450)
POTASSIUM SERPL-SCNC: 3.9 MMOL/L (ref 3.5–5.1)
RBC # BLD AUTO: 4.64 X10(6)UL (ref 3.8–5.3)
SODIUM SERPL-SCNC: 141 MMOL/L (ref 136–145)
WBC # BLD AUTO: 5.8 X10(3) UL (ref 4–11)

## 2019-08-28 PROCEDURE — 80069 RENAL FUNCTION PANEL: CPT

## 2019-08-28 PROCEDURE — 85025 COMPLETE CBC W/AUTO DIFF WBC: CPT

## 2019-08-28 PROCEDURE — 83036 HEMOGLOBIN GLYCOSYLATED A1C: CPT

## 2019-08-28 PROCEDURE — 36415 COLL VENOUS BLD VENIPUNCTURE: CPT

## 2019-08-30 RX ORDER — METOPROLOL SUCCINATE 50 MG/1
TABLET, EXTENDED RELEASE ORAL
Qty: 30 TABLET | Refills: 0 | Status: SHIPPED | OUTPATIENT
Start: 2019-08-30 | End: 2019-10-05 | Stop reason: SDUPTHER

## 2019-09-10 RX ORDER — OMEPRAZOLE 20 MG/1
20 CAPSULE, DELAYED RELEASE ORAL DAILY
Qty: 30 CAPSULE | Refills: 0 | Status: SHIPPED | OUTPATIENT
Start: 2019-09-10 | End: 2019-10-14 | Stop reason: SDUPTHER

## 2019-09-11 DIAGNOSIS — M06.9 RHEUMATOID ARTHRITIS, INVOLVING UNSPECIFIED SITE, UNSPECIFIED RHEUMATOID FACTOR PRESENCE: ICD-10-CM

## 2019-09-11 RX ORDER — METHOTREXATE 2.5 MG/1
TABLET ORAL
Qty: 72 TABLET | Refills: 0 | OUTPATIENT
Start: 2019-09-11

## 2019-09-16 ENCOUNTER — HOSPITAL (OUTPATIENT)
Dept: OTHER | Age: 69
End: 2019-09-16
Attending: INTERNAL MEDICINE

## 2019-09-16 RX ORDER — SIMVASTATIN 40 MG
TABLET ORAL
Qty: 90 TABLET | Refills: 0 | Status: SHIPPED | OUTPATIENT
Start: 2019-09-16 | End: 2019-12-11 | Stop reason: SDUPTHER

## 2019-09-17 DIAGNOSIS — M06.9 RHEUMATOID ARTHRITIS, INVOLVING UNSPECIFIED SITE, UNSPECIFIED RHEUMATOID FACTOR PRESENCE: Primary | ICD-10-CM

## 2019-09-18 ENCOUNTER — APPOINTMENT (OUTPATIENT)
Dept: RHEUMATOLOGY | Age: 69
End: 2019-09-18

## 2019-10-01 ENCOUNTER — HOSPITAL (OUTPATIENT)
Dept: OTHER | Age: 69
End: 2019-10-01
Attending: INTERNAL MEDICINE

## 2019-10-03 ENCOUNTER — TELEPHONE (OUTPATIENT)
Dept: RHEUMATOLOGY | Age: 69
End: 2019-10-03

## 2019-10-07 DIAGNOSIS — M06.9 RHEUMATOID ARTHRITIS, INVOLVING UNSPECIFIED SITE, UNSPECIFIED RHEUMATOID FACTOR PRESENCE: ICD-10-CM

## 2019-10-07 RX ORDER — METHOTREXATE 2.5 MG/1
TABLET ORAL
Qty: 72 TABLET | Refills: 0 | Status: SHIPPED | OUTPATIENT
Start: 2019-10-07 | End: 2020-04-13

## 2019-10-07 RX ORDER — METOPROLOL SUCCINATE 50 MG/1
TABLET, EXTENDED RELEASE ORAL
Qty: 30 TABLET | Refills: 0 | Status: SHIPPED | OUTPATIENT
Start: 2019-10-07 | End: 2019-10-24 | Stop reason: SDUPTHER

## 2019-10-07 RX ORDER — METHOTREXATE 2.5 MG/1
TABLET ORAL
Qty: 72 TABLET | Refills: 0 | Status: SHIPPED | OUTPATIENT
Start: 2019-10-07 | End: 2019-10-07 | Stop reason: SDUPTHER

## 2019-10-14 RX ORDER — OMEPRAZOLE 20 MG/1
20 CAPSULE, DELAYED RELEASE ORAL DAILY
Qty: 30 CAPSULE | Refills: 0 | Status: SHIPPED | OUTPATIENT
Start: 2019-10-14 | End: 2019-10-24 | Stop reason: SDUPTHER

## 2019-10-24 ENCOUNTER — OFFICE VISIT (OUTPATIENT)
Dept: FAMILY MEDICINE | Age: 69
End: 2019-10-24

## 2019-10-24 DIAGNOSIS — Z12.11 COLON CANCER SCREENING: ICD-10-CM

## 2019-10-24 DIAGNOSIS — Z13.820 OSTEOPOROSIS SCREENING: ICD-10-CM

## 2019-10-24 DIAGNOSIS — Z23 NEED FOR PNEUMOCOCCAL VACCINATION: ICD-10-CM

## 2019-10-24 DIAGNOSIS — K21.00 GASTROESOPHAGEAL REFLUX DISEASE WITH ESOPHAGITIS: ICD-10-CM

## 2019-10-24 DIAGNOSIS — I10 BENIGN ESSENTIAL HTN: Primary | ICD-10-CM

## 2019-10-24 DIAGNOSIS — Z78.0 POSTMENOPAUSAL: ICD-10-CM

## 2019-10-24 DIAGNOSIS — N18.30 TYPE 2 DIABETES MELLITUS WITH STAGE 3 CHRONIC KIDNEY DISEASE, WITH LONG-TERM CURRENT USE OF INSULIN (CMD): ICD-10-CM

## 2019-10-24 DIAGNOSIS — Z79.4 TYPE 2 DIABETES MELLITUS WITH STAGE 3 CHRONIC KIDNEY DISEASE, WITH LONG-TERM CURRENT USE OF INSULIN (CMD): ICD-10-CM

## 2019-10-24 DIAGNOSIS — Z12.39 BREAST CANCER SCREENING: ICD-10-CM

## 2019-10-24 DIAGNOSIS — E78.5 HYPERLIPIDEMIA, UNSPECIFIED HYPERLIPIDEMIA TYPE: ICD-10-CM

## 2019-10-24 DIAGNOSIS — Z23 NEED FOR TDAP VACCINATION: ICD-10-CM

## 2019-10-24 DIAGNOSIS — E11.22 TYPE 2 DIABETES MELLITUS WITH STAGE 3 CHRONIC KIDNEY DISEASE, WITH LONG-TERM CURRENT USE OF INSULIN (CMD): ICD-10-CM

## 2019-10-24 PROCEDURE — 90472 IMMUNIZATION ADMIN EACH ADD: CPT

## 2019-10-24 PROCEDURE — 99397 PER PM REEVAL EST PAT 65+ YR: CPT | Performed by: FAMILY MEDICINE

## 2019-10-24 PROCEDURE — 3079F DIAST BP 80-89 MM HG: CPT | Performed by: FAMILY MEDICINE

## 2019-10-24 PROCEDURE — 90670 PCV13 VACCINE IM: CPT

## 2019-10-24 PROCEDURE — 90715 TDAP VACCINE 7 YRS/> IM: CPT

## 2019-10-24 PROCEDURE — 90471 IMMUNIZATION ADMIN: CPT

## 2019-10-24 PROCEDURE — 3074F SYST BP LT 130 MM HG: CPT | Performed by: FAMILY MEDICINE

## 2019-10-24 RX ORDER — OMEPRAZOLE 20 MG/1
20 CAPSULE, DELAYED RELEASE ORAL DAILY
Qty: 90 CAPSULE | Refills: 0 | Status: SHIPPED | OUTPATIENT
Start: 2019-10-24 | End: 2020-01-28 | Stop reason: SDUPTHER

## 2019-10-24 RX ORDER — FOLIC ACID 1 MG/1
1000 TABLET ORAL DAILY
Qty: 90 TABLET | Refills: 2 | Status: SHIPPED | OUTPATIENT
Start: 2019-10-24 | End: 2020-04-13

## 2019-10-24 RX ORDER — VALSARTAN AND HYDROCHLOROTHIAZIDE 320; 12.5 MG/1; MG/1
1 TABLET, FILM COATED ORAL DAILY
Qty: 90 TABLET | Refills: 2 | Status: SHIPPED | OUTPATIENT
Start: 2019-10-24 | End: 2020-04-13

## 2019-10-24 RX ORDER — METOPROLOL SUCCINATE 50 MG/1
50 TABLET, EXTENDED RELEASE ORAL DAILY
Qty: 90 TABLET | Refills: 0 | Status: SHIPPED | OUTPATIENT
Start: 2019-10-24 | End: 2020-01-27 | Stop reason: SDUPTHER

## 2019-10-24 SDOH — HEALTH STABILITY: MENTAL HEALTH: HOW OFTEN DO YOU HAVE A DRINK CONTAINING ALCOHOL?: NEVER

## 2019-10-24 ASSESSMENT — PAIN SCALES - GENERAL: PAINLEVEL: 3-4

## 2019-10-24 ASSESSMENT — PATIENT HEALTH QUESTIONNAIRE - PHQ9
SUM OF ALL RESPONSES TO PHQ9 QUESTIONS 1 AND 2: 0
1. LITTLE INTEREST OR PLEASURE IN DOING THINGS: NOT AT ALL
2. FEELING DOWN, DEPRESSED OR HOPELESS: NOT AT ALL
SUM OF ALL RESPONSES TO PHQ9 QUESTIONS 1 AND 2: 0

## 2019-10-25 ENCOUNTER — TELEPHONE (OUTPATIENT)
Dept: FAMILY MEDICINE | Age: 69
End: 2019-10-25

## 2019-10-25 RX ORDER — TERAZOSIN 10 MG/1
CAPSULE ORAL
Qty: 90 CAPSULE | Refills: 0 | Status: SHIPPED | OUTPATIENT
Start: 2019-10-25 | End: 2020-01-22 | Stop reason: SDUPTHER

## 2019-10-31 ENCOUNTER — NURSE ONLY (OUTPATIENT)
Dept: INTERNAL MEDICINE | Age: 69
End: 2019-10-31

## 2019-10-31 VITALS — TEMPERATURE: 97.1 F

## 2019-10-31 DIAGNOSIS — Z23 FLU VACCINE NEED: Primary | ICD-10-CM

## 2019-10-31 PROCEDURE — 90471 IMMUNIZATION ADMIN: CPT

## 2019-10-31 PROCEDURE — 90662 IIV NO PRSV INCREASED AG IM: CPT

## 2019-11-01 ENCOUNTER — E-ADVICE (OUTPATIENT)
Dept: FAMILY MEDICINE | Age: 69
End: 2019-11-01

## 2019-11-13 ENCOUNTER — HOSPITAL (OUTPATIENT)
Dept: OTHER | Age: 69
End: 2019-11-13
Attending: INTERNAL MEDICINE

## 2019-11-13 ENCOUNTER — APPOINTMENT (OUTPATIENT)
Dept: RHEUMATOLOGY | Age: 69
End: 2019-11-13

## 2019-11-14 ENCOUNTER — OFFICE VISIT (OUTPATIENT)
Dept: RHEUMATOLOGY | Age: 69
End: 2019-11-14

## 2019-11-14 ENCOUNTER — APPOINTMENT (OUTPATIENT)
Dept: RHEUMATOLOGY | Age: 69
End: 2019-11-14

## 2019-11-14 VITALS
TEMPERATURE: 97.2 F | BODY MASS INDEX: 44.41 KG/M2 | HEIGHT: 68 IN | DIASTOLIC BLOOD PRESSURE: 77 MMHG | HEART RATE: 90 BPM | SYSTOLIC BLOOD PRESSURE: 123 MMHG | WEIGHT: 293 LBS

## 2019-11-14 DIAGNOSIS — M06.9 RHEUMATOID ARTHRITIS, INVOLVING UNSPECIFIED SITE, UNSPECIFIED RHEUMATOID FACTOR PRESENCE: Primary | ICD-10-CM

## 2019-11-14 PROCEDURE — 3074F SYST BP LT 130 MM HG: CPT | Performed by: INTERNAL MEDICINE

## 2019-11-14 PROCEDURE — 3078F DIAST BP <80 MM HG: CPT | Performed by: INTERNAL MEDICINE

## 2019-11-14 PROCEDURE — 99213 OFFICE O/P EST LOW 20 MIN: CPT | Performed by: INTERNAL MEDICINE

## 2019-11-14 RX ORDER — INFLIXIMAB 100 MG/10ML
INJECTION, POWDER, LYOPHILIZED, FOR SOLUTION INTRAVENOUS
Status: SHIPPED | COMMUNITY
Start: 2019-11-14

## 2019-11-15 RX ORDER — FUROSEMIDE 20 MG/1
TABLET ORAL
Qty: 90 TABLET | Refills: 0 | Status: SHIPPED | OUTPATIENT
Start: 2019-11-15 | End: 2020-02-11 | Stop reason: SDUPTHER

## 2019-11-20 ENCOUNTER — OFFICE VISIT (OUTPATIENT)
Dept: NEPHROLOGY | Facility: CLINIC | Age: 69
End: 2019-11-20
Payer: COMMERCIAL

## 2019-11-20 VITALS
BODY MASS INDEX: 43.4 KG/M2 | WEIGHT: 293 LBS | DIASTOLIC BLOOD PRESSURE: 80 MMHG | SYSTOLIC BLOOD PRESSURE: 134 MMHG | HEART RATE: 81 BPM | HEIGHT: 69 IN

## 2019-11-20 DIAGNOSIS — N18.30 CHRONIC KIDNEY DISEASE, STAGE III (MODERATE) (HCC): Primary | ICD-10-CM

## 2019-11-20 PROCEDURE — 99214 OFFICE O/P EST MOD 30 MIN: CPT | Performed by: INTERNAL MEDICINE

## 2019-11-21 NOTE — PROGRESS NOTES
11/20/19        Patient: Smitha Lim   YOB: 1950   Date of Visit: 11/20/2019       Dear  Dr. Elsie Neumann,     As you know she is a 46-year-old -American female with a history of adult onset diabetes mellitus, rheumatoid arthritis you for referring Stevo Sanches to my practice. Please find my assessment and plan below.                    Sincerely,   Catherine Diego MD   [facility]  [facility], [address] [place] [address]  [address]  [address] [id number]    Doc

## 2019-11-22 ENCOUNTER — E-ADVICE (OUTPATIENT)
Dept: FAMILY MEDICINE | Age: 69
End: 2019-11-22

## 2019-12-01 ENCOUNTER — HOSPITAL (OUTPATIENT)
Dept: OTHER | Age: 69
End: 2019-12-01
Attending: INTERNAL MEDICINE

## 2019-12-02 ENCOUNTER — TELEPHONE (OUTPATIENT)
Dept: NEPHROLOGY | Facility: CLINIC | Age: 69
End: 2019-12-02

## 2019-12-02 ENCOUNTER — E-ADVICE (OUTPATIENT)
Dept: FAMILY MEDICINE | Age: 69
End: 2019-12-02

## 2019-12-02 NOTE — TELEPHONE ENCOUNTER
Pt requesting a call back from  [facility]. regarding referral for GI physician.  Please call [phone]

## 2019-12-11 RX ORDER — SIMVASTATIN 40 MG
TABLET ORAL
Qty: 90 TABLET | Refills: 0 | Status: SHIPPED | OUTPATIENT
Start: 2019-12-11 | End: 2020-03-11 | Stop reason: SDUPTHER

## 2019-12-20 ENCOUNTER — TELEPHONE (OUTPATIENT)
Dept: FAMILY MEDICINE | Age: 69
End: 2019-12-20

## 2019-12-20 ENCOUNTER — E-ADVICE (OUTPATIENT)
Dept: FAMILY MEDICINE | Age: 69
End: 2019-12-20

## 2019-12-20 DIAGNOSIS — G47.33 OSA ON CPAP: Primary | ICD-10-CM

## 2019-12-31 ENCOUNTER — E-ADVICE (OUTPATIENT)
Dept: FAMILY MEDICINE | Age: 69
End: 2019-12-31

## 2020-01-01 ENCOUNTER — EXTERNAL RECORD (OUTPATIENT)
Dept: HEALTH INFORMATION MANAGEMENT | Facility: OTHER | Age: 70
End: 2020-01-01

## 2020-01-08 ENCOUNTER — HOSPITAL (OUTPATIENT)
Dept: OTHER | Age: 70
End: 2020-01-08
Attending: INTERNAL MEDICINE

## 2020-01-09 ENCOUNTER — APPOINTMENT (OUTPATIENT)
Dept: RHEUMATOLOGY | Age: 70
End: 2020-01-09

## 2020-01-22 RX ORDER — TERAZOSIN 10 MG/1
CAPSULE ORAL
Qty: 90 CAPSULE | Refills: 0 | Status: SHIPPED | OUTPATIENT
Start: 2020-01-22 | End: 2020-04-23

## 2020-01-24 ENCOUNTER — TELEPHONE (OUTPATIENT)
Dept: NEPHROLOGY | Facility: CLINIC | Age: 70
End: 2020-01-24

## 2020-01-27 RX ORDER — METOPROLOL SUCCINATE 50 MG/1
50 TABLET, EXTENDED RELEASE ORAL DAILY
Qty: 90 TABLET | Refills: 0 | Status: SHIPPED | OUTPATIENT
Start: 2020-01-27 | End: 2021-04-22 | Stop reason: SDUPTHER

## 2020-01-28 RX ORDER — OMEPRAZOLE 20 MG/1
20 CAPSULE, DELAYED RELEASE ORAL DAILY
Qty: 90 CAPSULE | Refills: 0 | Status: SHIPPED | OUTPATIENT
Start: 2020-01-28 | End: 2021-04-15 | Stop reason: SDUPTHER

## 2020-02-01 ENCOUNTER — HOSPITAL (OUTPATIENT)
Dept: OTHER | Age: 70
End: 2020-02-01
Attending: INTERNAL MEDICINE

## 2020-02-11 RX ORDER — FUROSEMIDE 20 MG/1
TABLET ORAL
Qty: 90 TABLET | Refills: 0 | Status: SHIPPED | OUTPATIENT
Start: 2020-02-11 | End: 2020-05-08

## 2020-02-12 ENCOUNTER — TELEPHONE (OUTPATIENT)
Dept: SCHEDULING | Age: 70
End: 2020-02-12

## 2020-02-12 ENCOUNTER — OFFICE VISIT (OUTPATIENT)
Dept: GASTROENTEROLOGY | Facility: CLINIC | Age: 70
End: 2020-02-12
Payer: COMMERCIAL

## 2020-02-12 VITALS
HEIGHT: 69 IN | HEART RATE: 84 BPM | SYSTOLIC BLOOD PRESSURE: 117 MMHG | WEIGHT: 293 LBS | DIASTOLIC BLOOD PRESSURE: 80 MMHG | BODY MASS INDEX: 43.4 KG/M2

## 2020-02-12 DIAGNOSIS — K21.9 GASTROESOPHAGEAL REFLUX DISEASE WITHOUT ESOPHAGITIS: Primary | ICD-10-CM

## 2020-02-12 PROCEDURE — 99203 OFFICE O/P NEW LOW 30 MIN: CPT | Performed by: INTERNAL MEDICINE

## 2020-02-13 RX ORDER — OMEPRAZOLE 20 MG/1
20 CAPSULE, DELAYED RELEASE ORAL EVERY MORNING
Qty: 90 CAPSULE | Refills: 3 | Status: SHIPPED | OUTPATIENT
Start: 2020-02-13

## 2020-02-14 NOTE — PROGRESS NOTES
HPI:    Patient ID: Suzette Leiva is a 81year old female. HPI  The patient is seen today on the advice of our nephrology RNs after being told to see GI by her PCP. The patient has a very longstanding history of \"acid reflux\" for \"many years\". history:  Quit smoking in .  1/2 pot of coffee daily  Rare alcohol    Family history:  Sister: Leukemia  Brother:  of heart disease at the age of 44  Mother had gastroesophageal reflux on omeprazole      Review of Systems  See above  The patient ha by mouth as needed. Take 4 capsules prior to dental proceedures     • Hylan (SYNVISC) 16 MG/2ML Intra-articular Solution Prefilled Syringe Inject 8 mg into the articular space.        Allergies:No Known Allergies   PHYSICAL EXAM:   Physical Exam   Constitut suspect that the chronic kidney disease is related tohe patient's hypertension and diabetes as opposed to the omeprazole. Renal function on therapy should continue to be followed by Dr. Gayathri Espinal.   We also discussed other potential side effects of long-term

## 2020-02-18 RX ORDER — LANCETS 33 GAUGE
EACH MISCELLANEOUS
Qty: 400 EACH | Refills: 0 | Status: SHIPPED | OUTPATIENT
Start: 2020-02-18 | End: 2020-08-07

## 2020-02-19 ENCOUNTER — LAB ENCOUNTER (OUTPATIENT)
Dept: LAB | Facility: HOSPITAL | Age: 70
End: 2020-02-19
Attending: INTERNAL MEDICINE
Payer: COMMERCIAL

## 2020-02-19 DIAGNOSIS — E11.40 DIABETIC NEUROPATHY WITH NEUROLOGIC COMPLICATION (HCC): Primary | ICD-10-CM

## 2020-02-19 DIAGNOSIS — E11.49 DIABETIC NEUROPATHY WITH NEUROLOGIC COMPLICATION (HCC): Primary | ICD-10-CM

## 2020-02-19 DIAGNOSIS — N18.30 CHRONIC KIDNEY DISEASE, STAGE III (MODERATE) (HCC): ICD-10-CM

## 2020-02-19 LAB
ALBUMIN SERPL-MCNC: 3.5 G/DL (ref 3.4–5)
ALBUMIN/GLOB SERPL: 0.8 {RATIO} (ref 1–2)
ALP LIVER SERPL-CCNC: 81 U/L (ref 55–142)
ALT SERPL-CCNC: 26 U/L (ref 13–56)
ANION GAP SERPL CALC-SCNC: 10 MMOL/L (ref 0–18)
AST SERPL-CCNC: 20 U/L (ref 15–37)
BASOPHILS # BLD AUTO: 0.05 X10(3) UL (ref 0–0.2)
BASOPHILS NFR BLD AUTO: 0.8 %
BILIRUB SERPL-MCNC: 0.5 MG/DL (ref 0.1–2)
BILIRUB UR QL: NEGATIVE
BUN BLD-MCNC: 17 MG/DL (ref 7–18)
BUN/CREAT SERPL: 10.8 (ref 10–20)
CALCIUM BLD-MCNC: 9.4 MG/DL (ref 8.5–10.1)
CHLORIDE SERPL-SCNC: 107 MMOL/L (ref 98–112)
CHOLEST SMN-MCNC: 153 MG/DL (ref ?–200)
CLARITY UR: CLEAR
CO2 SERPL-SCNC: 24 MMOL/L (ref 21–32)
COLOR UR: YELLOW
CREAT BLD-MCNC: 1.57 MG/DL (ref 0.55–1.02)
CREAT UR-SCNC: 83.1 MG/DL
DEPRECATED RDW RBC AUTO: 50.4 FL (ref 35.1–46.3)
EOSINOPHIL # BLD AUTO: 0.27 X10(3) UL (ref 0–0.7)
EOSINOPHIL NFR BLD AUTO: 4.3 %
ERYTHROCYTE [DISTWIDTH] IN BLOOD BY AUTOMATED COUNT: 14.5 % (ref 11–15)
EST. AVERAGE GLUCOSE BLD GHB EST-MCNC: 160 MG/DL (ref 68–126)
GLOBULIN PLAS-MCNC: 4.6 G/DL (ref 2.8–4.4)
GLUCOSE BLD-MCNC: 123 MG/DL (ref 70–99)
GLUCOSE UR-MCNC: NEGATIVE MG/DL
HBA1C MFR BLD HPLC: 7.2 % (ref ?–5.7)
HCT VFR BLD AUTO: 43.7 % (ref 35–48)
HDLC SERPL-MCNC: 54 MG/DL (ref 40–59)
HGB BLD-MCNC: 14 G/DL (ref 12–16)
HGB UR QL STRIP.AUTO: NEGATIVE
IMM GRANULOCYTES # BLD AUTO: 0.02 X10(3) UL (ref 0–1)
IMM GRANULOCYTES NFR BLD: 0.3 %
KETONES UR-MCNC: NEGATIVE MG/DL
LDLC SERPL CALC-MCNC: 79 MG/DL (ref ?–100)
LEUKOCYTE ESTERASE UR QL STRIP.AUTO: NEGATIVE
LYMPHOCYTES # BLD AUTO: 2.74 X10(3) UL (ref 1–4)
LYMPHOCYTES NFR BLD AUTO: 44.1 %
M PROTEIN MFR SERPL ELPH: 8.1 G/DL (ref 6.4–8.2)
MCH RBC QN AUTO: 30.8 PG (ref 26–34)
MCHC RBC AUTO-ENTMCNC: 32 G/DL (ref 31–37)
MCV RBC AUTO: 96 FL (ref 80–100)
MICROALBUMIN UR-MCNC: 2.42 MG/DL
MICROALBUMIN/CREAT 24H UR-RTO: 29.1 UG/MG (ref ?–30)
MONOCYTES # BLD AUTO: 0.74 X10(3) UL (ref 0.1–1)
MONOCYTES NFR BLD AUTO: 11.9 %
NEUTROPHILS # BLD AUTO: 2.4 X10 (3) UL (ref 1.5–7.7)
NEUTROPHILS # BLD AUTO: 2.4 X10(3) UL (ref 1.5–7.7)
NEUTROPHILS NFR BLD AUTO: 38.6 %
NITRITE UR QL STRIP.AUTO: NEGATIVE
NONHDLC SERPL-MCNC: 99 MG/DL (ref ?–130)
OSMOLALITY SERPL CALC.SUM OF ELEC: 295 MOSM/KG (ref 275–295)
PATIENT FASTING Y/N/NP: YES
PATIENT FASTING Y/N/NP: YES
PH UR: 6 [PH] (ref 5–8)
PHOSPHATE SERPL-MCNC: 2.9 MG/DL (ref 2.5–4.9)
PLATELET # BLD AUTO: 188 10(3)UL (ref 150–450)
POTASSIUM SERPL-SCNC: 4.1 MMOL/L (ref 3.5–5.1)
PROT UR-MCNC: NEGATIVE MG/DL
RBC # BLD AUTO: 4.55 X10(6)UL (ref 3.8–5.3)
SODIUM SERPL-SCNC: 141 MMOL/L (ref 136–145)
SP GR UR STRIP: 1.01 (ref 1–1.03)
TRIGL SERPL-MCNC: 102 MG/DL (ref 30–149)
UROBILINOGEN UR STRIP-ACNC: <2
VLDLC SERPL CALC-MCNC: 20 MG/DL (ref 0–30)
WBC # BLD AUTO: 6.2 X10(3) UL (ref 4–11)

## 2020-02-19 PROCEDURE — 84100 ASSAY OF PHOSPHORUS: CPT

## 2020-02-19 PROCEDURE — 81003 URINALYSIS AUTO W/O SCOPE: CPT

## 2020-02-19 PROCEDURE — 85025 COMPLETE CBC W/AUTO DIFF WBC: CPT

## 2020-02-19 PROCEDURE — 80053 COMPREHEN METABOLIC PANEL: CPT

## 2020-02-19 PROCEDURE — 80061 LIPID PANEL: CPT

## 2020-02-19 PROCEDURE — 82570 ASSAY OF URINE CREATININE: CPT

## 2020-02-19 PROCEDURE — 83036 HEMOGLOBIN GLYCOSYLATED A1C: CPT

## 2020-02-19 PROCEDURE — 36415 COLL VENOUS BLD VENIPUNCTURE: CPT

## 2020-02-19 PROCEDURE — 82043 UR ALBUMIN QUANTITATIVE: CPT

## 2020-02-22 ENCOUNTER — TELEPHONE (OUTPATIENT)
Dept: NEPHROLOGY | Facility: CLINIC | Age: 70
End: 2020-02-22

## 2020-02-22 DIAGNOSIS — N18.30 CHRONIC KIDNEY DISEASE, STAGE 3 (HCC): Primary | ICD-10-CM

## 2020-02-22 NOTE — TELEPHONE ENCOUNTER
Kidney function remains stable. If doing well CPM.  Repeat CBC and renal panel in 3 months and then see for follow-up. Make standing order.

## 2020-02-24 NOTE — TELEPHONE ENCOUNTER
Called and spoke with pt and informed of MD note below. Pt in agreement and verbalized understanding, no further questions at this time. Standing lab orders placed in epic.

## 2020-02-25 ENCOUNTER — OFFICE VISIT (OUTPATIENT)
Dept: FAMILY MEDICINE | Age: 70
End: 2020-02-25

## 2020-02-25 DIAGNOSIS — Z12.11 COLON CANCER SCREENING: Primary | ICD-10-CM

## 2020-02-25 DIAGNOSIS — E11.22 TYPE 2 DIABETES MELLITUS WITH STAGE 3 CHRONIC KIDNEY DISEASE, WITHOUT LONG-TERM CURRENT USE OF INSULIN (CMD): ICD-10-CM

## 2020-02-25 DIAGNOSIS — K21.9 GASTROESOPHAGEAL REFLUX DISEASE WITHOUT ESOPHAGITIS: ICD-10-CM

## 2020-02-25 DIAGNOSIS — Z23 NEED FOR SHINGLES VACCINE: ICD-10-CM

## 2020-02-25 DIAGNOSIS — G47.33 OSA ON CPAP: ICD-10-CM

## 2020-02-25 DIAGNOSIS — N18.30 TYPE 2 DIABETES MELLITUS WITH STAGE 3 CHRONIC KIDNEY DISEASE, WITHOUT LONG-TERM CURRENT USE OF INSULIN (CMD): ICD-10-CM

## 2020-02-25 DIAGNOSIS — I10 ESSENTIAL HYPERTENSION: ICD-10-CM

## 2020-02-25 PROCEDURE — 82274 ASSAY TEST FOR BLOOD FECAL: CPT | Performed by: FAMILY MEDICINE

## 2020-02-25 PROCEDURE — 99214 OFFICE O/P EST MOD 30 MIN: CPT | Performed by: FAMILY MEDICINE

## 2020-02-25 PROCEDURE — 3078F DIAST BP <80 MM HG: CPT | Performed by: FAMILY MEDICINE

## 2020-02-25 PROCEDURE — 90471 IMMUNIZATION ADMIN: CPT | Performed by: FAMILY MEDICINE

## 2020-02-25 PROCEDURE — 90750 HZV VACC RECOMBINANT IM: CPT

## 2020-02-25 SDOH — HEALTH STABILITY: MENTAL HEALTH: HOW OFTEN DO YOU HAVE A DRINK CONTAINING ALCOHOL?: NEVER

## 2020-02-25 ASSESSMENT — COGNITIVE AND FUNCTIONAL STATUS - GENERAL
BECAUSE OF A PHYSICAL, MENTAL, OR EMOTIONAL CONDITION, DO YOU HAVE SERIOUS DIFFICULTY CONCENTRATING, REMEMBERING OR MAKING DECISIONS: NO
BECAUSE OF A PHYSICAL, MENTAL, OR EMOTIONAL CONDITION, DO YOU HAVE DIFFICULTY DOING ERRANDS ALONE: NO
DO YOU HAVE SERIOUS DIFFICULTY WALKING OR CLIMBING STAIRS: NO
DO YOU HAVE DIFFICULTY DRESSING OR BATHING: NO

## 2020-02-25 ASSESSMENT — PATIENT HEALTH QUESTIONNAIRE - PHQ9
7. TROUBLE CONCENTRATING ON THINGS, SUCH AS READING THE NEWSPAPER OR WATCHING TELEVISION: NOT AT ALL
SUM OF ALL RESPONSES TO PHQ9 QUESTIONS 1 TO 9: 0
9. THOUGHTS THAT YOU WOULD BE BETTER OFF DEAD, OR OF HURTING YOURSELF: NOT AT ALL
SUM OF ALL RESPONSES TO PHQ9 QUESTIONS 1 AND 2: 0
1. LITTLE INTEREST OR PLEASURE IN DOING THINGS: NOT AT ALL
6. FEELING BAD ABOUT YOURSELF - OR THAT YOU ARE A FAILURE OR HAVE LET YOURSELF OR YOUR FAMILY DOWN: NOT AT ALL
5. POOR APPETITE, WEIGHT LOSS, OR OVEREATING: NOT AT ALL
SUM OF ALL RESPONSES TO PHQ QUESTIONS 1-9: 0
8. MOVING OR SPEAKING SO SLOWLY THAT OTHER PEOPLE COULD HAVE NOTICED. OR THE OPPOSITE, BEING SO FIGETY OR RESTLESS THAT YOU HAVE BEEN MOVING AROUND A LOT MORE THAN USUAL: NOT AT ALL
4. FEELING TIRED OR HAVING LITTLE ENERGY: NOT AT ALL
10. IF YOU CHECKED OFF ANY PROBLEMS, HOW DIFFICULT HAVE THESE PROBLEMS MADE IT FOR YOU TO DO YOUR WORK, TAKE CARE OF THINGS AT HOME, OR GET ALONG WITH OTHER PEOPLE: NOT DIFFICULT AT ALL
SUM OF ALL RESPONSES TO PHQ9 QUESTIONS 1 AND 2: 0
2. FEELING DOWN, DEPRESSED OR HOPELESS: NOT AT ALL
3. TROUBLE FALLING OR STAYING ASLEEP OR SLEEPING TOO MUCH: NOT AT ALL

## 2020-02-25 ASSESSMENT — PAIN SCALES - GENERAL: PAINLEVEL: 0

## 2020-02-27 LAB — HEMOCCULT STL QL: NEGATIVE

## 2020-02-28 ENCOUNTER — TELEPHONE (OUTPATIENT)
Dept: FAMILY MEDICINE | Age: 70
End: 2020-02-28

## 2020-03-03 ENCOUNTER — HOSPITAL (OUTPATIENT)
Dept: OTHER | Age: 70
End: 2020-03-03
Attending: INTERNAL MEDICINE

## 2020-03-05 ENCOUNTER — APPOINTMENT (OUTPATIENT)
Dept: RHEUMATOLOGY | Age: 70
End: 2020-03-05

## 2020-03-12 ENCOUNTER — HOSPITAL ENCOUNTER (OUTPATIENT)
Dept: BONE DENSITY | Facility: HOSPITAL | Age: 70
Discharge: HOME | End: 2020-03-12
Payer: COMMERCIAL

## 2020-03-12 ENCOUNTER — HOSPITAL ENCOUNTER (OUTPATIENT)
Dept: MAMMOGRAPHY | Facility: HOSPITAL | Age: 70
Discharge: HOME | End: 2020-03-12
Payer: COMMERCIAL

## 2020-03-12 DIAGNOSIS — Z12.39 BREAST CANCER SCREENING: ICD-10-CM

## 2020-03-12 DIAGNOSIS — Z78.0 POSTMENOPAUSAL: ICD-10-CM

## 2020-03-12 DIAGNOSIS — Z13.820 OSTEOPOROSIS SCREENING: ICD-10-CM

## 2020-03-12 PROCEDURE — 77067 SCR MAMMO BI INCL CAD: CPT

## 2020-03-12 PROCEDURE — 77063 BREAST TOMOSYNTHESIS BI: CPT

## 2020-03-12 PROCEDURE — 77067 SCR MAMMO BI INCL CAD: CPT | Performed by: FAMILY MEDICINE

## 2020-03-12 PROCEDURE — 77080 DXA BONE DENSITY AXIAL: CPT

## 2020-03-12 PROCEDURE — 77063 BREAST TOMOSYNTHESIS BI: CPT | Performed by: FAMILY MEDICINE

## 2020-03-12 PROCEDURE — 77080 DXA BONE DENSITY AXIAL: CPT | Performed by: FAMILY MEDICINE

## 2020-03-12 RX ORDER — SIMVASTATIN 40 MG
TABLET ORAL
Qty: 90 TABLET | Refills: 0 | Status: SHIPPED | OUTPATIENT
Start: 2020-03-12 | End: 2020-06-05 | Stop reason: SDUPTHER

## 2020-03-17 ENCOUNTER — TELEPHONE (OUTPATIENT)
Dept: FAMILY MEDICINE | Age: 70
End: 2020-03-17

## 2020-04-01 ENCOUNTER — HOSPITAL (OUTPATIENT)
Dept: OTHER | Age: 70
End: 2020-04-01
Attending: INTERNAL MEDICINE

## 2020-04-13 DIAGNOSIS — M06.9 RHEUMATOID ARTHRITIS, INVOLVING UNSPECIFIED SITE, UNSPECIFIED RHEUMATOID FACTOR PRESENCE: ICD-10-CM

## 2020-04-13 RX ORDER — VALSARTAN AND HYDROCHLOROTHIAZIDE 320; 12.5 MG/1; MG/1
1 TABLET, FILM COATED ORAL DAILY
Qty: 30 TABLET | Refills: 3 | Status: SHIPPED | OUTPATIENT
Start: 2020-04-13 | End: 2020-08-10

## 2020-04-13 RX ORDER — METHOTREXATE 2.5 MG/1
TABLET ORAL
Qty: 72 TABLET | Refills: 0 | Status: SHIPPED | OUTPATIENT
Start: 2020-04-13 | End: 2020-09-22

## 2020-04-13 RX ORDER — FOLIC ACID 1 MG/1
1000 TABLET ORAL DAILY
Qty: 30 TABLET | Refills: 2 | Status: SHIPPED | OUTPATIENT
Start: 2020-04-13 | End: 2020-10-05 | Stop reason: SDUPTHER

## 2020-04-13 RX ORDER — METHOTREXATE 2.5 MG/1
TABLET ORAL
Qty: 72 TABLET | Refills: 0 | Status: CANCELLED | OUTPATIENT
Start: 2020-04-13

## 2020-04-23 RX ORDER — TERAZOSIN 10 MG/1
CAPSULE ORAL
Qty: 90 CAPSULE | Refills: 0 | Status: SHIPPED | OUTPATIENT
Start: 2020-04-23 | End: 2020-07-19 | Stop reason: SDUPTHER

## 2020-04-30 ENCOUNTER — APPOINTMENT (OUTPATIENT)
Dept: RHEUMATOLOGY | Age: 70
End: 2020-04-30

## 2020-04-30 ENCOUNTER — OFFICE VISIT (OUTPATIENT)
Dept: RHEUMATOLOGY | Age: 70
End: 2020-04-30

## 2020-04-30 VITALS — SYSTOLIC BLOOD PRESSURE: 179 MMHG | TEMPERATURE: 96.6 F | HEART RATE: 84 BPM | DIASTOLIC BLOOD PRESSURE: 77 MMHG

## 2020-04-30 DIAGNOSIS — M06.9 RHEUMATOID ARTHRITIS, INVOLVING UNSPECIFIED SITE, UNSPECIFIED RHEUMATOID FACTOR PRESENCE: Primary | ICD-10-CM

## 2020-04-30 PROCEDURE — 3078F DIAST BP <80 MM HG: CPT | Performed by: INTERNAL MEDICINE

## 2020-04-30 PROCEDURE — 99213 OFFICE O/P EST LOW 20 MIN: CPT | Performed by: INTERNAL MEDICINE

## 2020-04-30 PROCEDURE — 3077F SYST BP >= 140 MM HG: CPT | Performed by: INTERNAL MEDICINE

## 2020-05-06 ENCOUNTER — E-ADVICE (OUTPATIENT)
Dept: FAMILY MEDICINE | Age: 70
End: 2020-05-06

## 2020-05-08 RX ORDER — FUROSEMIDE 20 MG/1
TABLET ORAL
Qty: 90 TABLET | Refills: 0 | Status: SHIPPED | OUTPATIENT
Start: 2020-05-08 | End: 2020-07-29

## 2020-05-15 DIAGNOSIS — Z78.0 POSTMENOPAUSAL: ICD-10-CM

## 2020-05-15 DIAGNOSIS — Z13.820 OSTEOPOROSIS SCREENING: ICD-10-CM

## 2020-05-15 DIAGNOSIS — Z12.39 BREAST CANCER SCREENING: ICD-10-CM

## 2020-06-05 RX ORDER — SIMVASTATIN 40 MG
TABLET ORAL
Qty: 90 TABLET | Refills: 0 | Status: SHIPPED | OUTPATIENT
Start: 2020-06-05 | End: 2020-09-02

## 2020-06-15 ENCOUNTER — TELEPHONE (OUTPATIENT)
Dept: SCHEDULING | Age: 70
End: 2020-06-15

## 2020-06-16 ENCOUNTER — NURSE ONLY (OUTPATIENT)
Dept: INTERNAL MEDICINE | Age: 70
End: 2020-06-16

## 2020-06-16 DIAGNOSIS — Z23 NEED FOR PROPHYLACTIC VACCINATION AGAINST COMBINATIONS OF DISEASES: ICD-10-CM

## 2020-06-16 PROCEDURE — X1094 NO CHARGE VISIT: HCPCS | Performed by: FAMILY MEDICINE

## 2020-06-16 PROCEDURE — 90471 IMMUNIZATION ADMIN: CPT

## 2020-06-16 PROCEDURE — 90750 HZV VACC RECOMBINANT IM: CPT

## 2020-06-18 ENCOUNTER — HOSPITAL (OUTPATIENT)
Dept: OTHER | Age: 70
End: 2020-06-18
Attending: INTERNAL MEDICINE

## 2020-06-25 ENCOUNTER — APPOINTMENT (OUTPATIENT)
Dept: RHEUMATOLOGY | Age: 70
End: 2020-06-25

## 2020-07-01 ENCOUNTER — HOSPITAL (OUTPATIENT)
Dept: OTHER | Age: 70
End: 2020-07-01
Attending: INTERNAL MEDICINE

## 2020-07-08 ENCOUNTER — PATIENT MESSAGE (OUTPATIENT)
Dept: NEPHROLOGY | Facility: CLINIC | Age: 70
End: 2020-07-08

## 2020-07-08 ENCOUNTER — E-ADVICE (OUTPATIENT)
Dept: FAMILY MEDICINE | Age: 70
End: 2020-07-08

## 2020-07-08 NOTE — TELEPHONE ENCOUNTER
I called and spoke with the patient. She states she has a PPO so does not need a referral order. Provided her with Dr. Case Clark contact information. Offered to book follow up with Dr. Ramonia Kayser but she declined at this time. States she will do so later.  She is

## 2020-07-08 NOTE — TELEPHONE ENCOUNTER
Patient requesting referral to a cardiologist for a routine check. No specific symptoms. Per family history I can see that she had a sibling with a heart disorder.      Per her insurance it does not seem she actually requires an order, seems she is just [facility] [employer]

## 2020-07-08 NOTE — TELEPHONE ENCOUNTER
From: Mamadou Coffey  To: Geovanni Yousif MD  Sent: 7/8/2020 1:30 PM CDT  Subject: Referral Request    Hi Dr. Kayla Paz,  Could you refer a cardiologist to me.  I am not presenting with specific issues at this time, but with all of my health challenges, and o

## 2020-07-08 NOTE — TELEPHONE ENCOUNTER
She should check with her PCP but if they do not care can refer her to see Dr. Makayla George. She is also due for follow-up. Needs to do her standing order and then see me for follow-up.

## 2020-07-09 ENCOUNTER — V-VISIT (OUTPATIENT)
Dept: FAMILY MEDICINE | Age: 70
End: 2020-07-09

## 2020-07-09 DIAGNOSIS — Z78.9 NON-SMOKER: ICD-10-CM

## 2020-07-09 DIAGNOSIS — E66.01 MORBID OBESITY WITH BMI OF 50.0-59.9, ADULT (CMD): ICD-10-CM

## 2020-07-09 DIAGNOSIS — L02.411 ABSCESS OF AXILLA, RIGHT: Primary | ICD-10-CM

## 2020-07-09 DIAGNOSIS — Z71.9 COUNSELING, UNSPECIFIED: ICD-10-CM

## 2020-07-09 PROCEDURE — 99214 OFFICE O/P EST MOD 30 MIN: CPT | Performed by: FAMILY MEDICINE

## 2020-07-09 RX ORDER — CLINDAMYCIN HYDROCHLORIDE 300 MG/1
300 CAPSULE ORAL 2 TIMES DAILY
Qty: 20 CAPSULE | Refills: 0 | Status: SHIPPED | OUTPATIENT
Start: 2020-07-09 | End: 2020-07-28 | Stop reason: SDUPTHER

## 2020-07-09 SDOH — HEALTH STABILITY: PHYSICAL HEALTH: ON AVERAGE, HOW MANY MINUTES DO YOU ENGAGE IN EXERCISE AT THIS LEVEL?: 0 MIN

## 2020-07-09 SDOH — HEALTH STABILITY: MENTAL HEALTH: HOW OFTEN DO YOU HAVE A DRINK CONTAINING ALCOHOL?: NEVER

## 2020-07-09 SDOH — HEALTH STABILITY: PHYSICAL HEALTH: ON AVERAGE, HOW MANY DAYS PER WEEK DO YOU ENGAGE IN MODERATE TO STRENUOUS EXERCISE (LIKE A BRISK WALK)?: 0 DAYS

## 2020-07-09 ASSESSMENT — PATIENT HEALTH QUESTIONNAIRE - PHQ9
1. LITTLE INTEREST OR PLEASURE IN DOING THINGS: NOT AT ALL
SUM OF ALL RESPONSES TO PHQ9 QUESTIONS 1 AND 2: 1
CLINICAL INTERPRETATION OF PHQ9 SCORE: NO FURTHER SCREENING NEEDED
2. FEELING DOWN, DEPRESSED OR HOPELESS: SEVERAL DAYS
CLINICAL INTERPRETATION OF PHQ2 SCORE: NO FURTHER SCREENING NEEDED
SUM OF ALL RESPONSES TO PHQ9 QUESTIONS 1 AND 2: 1

## 2020-07-09 ASSESSMENT — ENCOUNTER SYMPTOMS
FEVER: 0
CHILLS: 0

## 2020-07-10 ENCOUNTER — TELEPHONE (OUTPATIENT)
Dept: FAMILY MEDICINE | Age: 70
End: 2020-07-10

## 2020-07-19 RX ORDER — TERAZOSIN 10 MG/1
CAPSULE ORAL
Qty: 90 CAPSULE | Refills: 0 | Status: SHIPPED | OUTPATIENT
Start: 2020-07-19 | End: 2021-09-30 | Stop reason: SDUPTHER

## 2020-07-22 RX ORDER — FLUCONAZOLE 150 MG/1
150 TABLET ORAL ONCE
Qty: 1 TABLET | Refills: 4 | Status: SHIPPED | OUTPATIENT
Start: 2020-07-22 | End: 2020-07-28 | Stop reason: SDUPTHER

## 2020-07-28 ENCOUNTER — V-VISIT (OUTPATIENT)
Dept: FAMILY MEDICINE | Age: 70
End: 2020-07-28

## 2020-07-28 DIAGNOSIS — N18.30 TYPE 2 DIABETES MELLITUS WITH STAGE 3 CHRONIC KIDNEY DISEASE, WITH LONG-TERM CURRENT USE OF INSULIN (CMD): ICD-10-CM

## 2020-07-28 DIAGNOSIS — Z13.29 THYROID DISORDER SCREENING: ICD-10-CM

## 2020-07-28 DIAGNOSIS — Z79.4 TYPE 2 DIABETES MELLITUS WITH STAGE 3 CHRONIC KIDNEY DISEASE, WITH LONG-TERM CURRENT USE OF INSULIN (CMD): ICD-10-CM

## 2020-07-28 DIAGNOSIS — B37.9 YEAST INFECTION: Primary | ICD-10-CM

## 2020-07-28 DIAGNOSIS — L73.2 HIDRADENITIS SUPPURATIVA: ICD-10-CM

## 2020-07-28 DIAGNOSIS — I10 BENIGN ESSENTIAL HTN: ICD-10-CM

## 2020-07-28 DIAGNOSIS — E11.22 TYPE 2 DIABETES MELLITUS WITH STAGE 3 CHRONIC KIDNEY DISEASE, WITH LONG-TERM CURRENT USE OF INSULIN (CMD): ICD-10-CM

## 2020-07-28 DIAGNOSIS — E78.5 HYPERLIPIDEMIA, UNSPECIFIED HYPERLIPIDEMIA TYPE: ICD-10-CM

## 2020-07-28 DIAGNOSIS — L02.411 ABSCESS OF AXILLA, RIGHT: ICD-10-CM

## 2020-07-28 PROCEDURE — 99214 OFFICE O/P EST MOD 30 MIN: CPT | Performed by: FAMILY MEDICINE

## 2020-07-28 RX ORDER — CLINDAMYCIN HYDROCHLORIDE 300 MG/1
300 CAPSULE ORAL 2 TIMES DAILY
Qty: 14 CAPSULE | Refills: 0 | Status: SHIPPED | OUTPATIENT
Start: 2020-07-28 | End: 2020-08-04

## 2020-07-28 RX ORDER — FLUCONAZOLE 150 MG/1
150 TABLET ORAL ONCE
Qty: 1 TABLET | Refills: 4 | Status: SHIPPED | OUTPATIENT
Start: 2020-07-28 | End: 2020-07-28

## 2020-07-28 ASSESSMENT — PATIENT HEALTH QUESTIONNAIRE - PHQ9
CLINICAL INTERPRETATION OF PHQ2 SCORE: NO FURTHER SCREENING NEEDED
CLINICAL INTERPRETATION OF PHQ9 SCORE: NO FURTHER SCREENING NEEDED
SUM OF ALL RESPONSES TO PHQ9 QUESTIONS 1 AND 2: 0
SUM OF ALL RESPONSES TO PHQ9 QUESTIONS 1 AND 2: 0
2. FEELING DOWN, DEPRESSED OR HOPELESS: NOT AT ALL
1. LITTLE INTEREST OR PLEASURE IN DOING THINGS: NOT AT ALL

## 2020-07-29 RX ORDER — FUROSEMIDE 20 MG/1
TABLET ORAL
Qty: 90 TABLET | Refills: 0 | Status: SHIPPED | OUTPATIENT
Start: 2020-07-29 | End: 2021-01-27

## 2020-08-05 ENCOUNTER — HOSPITAL (OUTPATIENT)
Dept: OTHER | Age: 70
End: 2020-08-05
Attending: INTERNAL MEDICINE

## 2020-08-07 RX ORDER — LANCETS
EACH MISCELLANEOUS
Qty: 100 EACH | Refills: 3 | Status: SHIPPED | OUTPATIENT
Start: 2020-08-07 | End: 2020-12-21 | Stop reason: SDUPTHER

## 2020-08-10 RX ORDER — VALSARTAN AND HYDROCHLOROTHIAZIDE 320; 12.5 MG/1; MG/1
1 TABLET, FILM COATED ORAL DAILY
Qty: 30 TABLET | Refills: 3 | Status: SHIPPED | OUTPATIENT
Start: 2020-08-10 | End: 2020-12-03

## 2020-08-20 ENCOUNTER — APPOINTMENT (OUTPATIENT)
Dept: RHEUMATOLOGY | Age: 70
End: 2020-08-20

## 2020-09-01 ENCOUNTER — HOSPITAL (OUTPATIENT)
Dept: OTHER | Age: 70
End: 2020-09-01
Attending: INTERNAL MEDICINE

## 2020-09-02 RX ORDER — SIMVASTATIN 40 MG
TABLET ORAL
Qty: 90 TABLET | Refills: 0 | Status: SHIPPED | OUTPATIENT
Start: 2020-09-02 | End: 2020-12-21 | Stop reason: SDUPTHER

## 2020-09-22 DIAGNOSIS — M06.9 RHEUMATOID ARTHRITIS, INVOLVING UNSPECIFIED SITE, UNSPECIFIED RHEUMATOID FACTOR PRESENCE: ICD-10-CM

## 2020-09-22 RX ORDER — METHOTREXATE 2.5 MG/1
TABLET ORAL
Qty: 72 TABLET | Refills: 0 | Status: SHIPPED | OUTPATIENT
Start: 2020-09-22 | End: 2020-12-21

## 2020-10-05 RX ORDER — FOLIC ACID 1 MG/1
1000 TABLET ORAL DAILY
Qty: 30 TABLET | Refills: 2 | Status: SHIPPED | OUTPATIENT
Start: 2020-10-05 | End: 2020-12-21

## 2020-10-09 ENCOUNTER — LAB SERVICES (OUTPATIENT)
Dept: INTERNAL MEDICINE | Age: 70
End: 2020-10-09

## 2020-10-09 VITALS — TEMPERATURE: 95.5 F

## 2020-10-09 DIAGNOSIS — E78.5 HYPERLIPIDEMIA, UNSPECIFIED HYPERLIPIDEMIA TYPE: ICD-10-CM

## 2020-10-09 DIAGNOSIS — N18.31 TYPE 2 DIABETES MELLITUS WITH STAGE 3A CHRONIC KIDNEY DISEASE, WITH LONG-TERM CURRENT USE OF INSULIN (CMD): ICD-10-CM

## 2020-10-09 DIAGNOSIS — Z79.4 TYPE 2 DIABETES MELLITUS WITH STAGE 3A CHRONIC KIDNEY DISEASE, WITH LONG-TERM CURRENT USE OF INSULIN (CMD): ICD-10-CM

## 2020-10-09 DIAGNOSIS — I10 BENIGN ESSENTIAL HYPERTENSION: ICD-10-CM

## 2020-10-09 DIAGNOSIS — Z23 FLU VACCINE NEED: ICD-10-CM

## 2020-10-09 DIAGNOSIS — Z13.29 THYROID DISORDER SCREENING: ICD-10-CM

## 2020-10-09 DIAGNOSIS — I10 BENIGN ESSENTIAL HTN: ICD-10-CM

## 2020-10-09 DIAGNOSIS — N18.30 TYPE 2 DIABETES MELLITUS WITH STAGE 3 CHRONIC KIDNEY DISEASE, WITH LONG-TERM CURRENT USE OF INSULIN (CMD): ICD-10-CM

## 2020-10-09 DIAGNOSIS — E11.22 TYPE 2 DIABETES MELLITUS WITH STAGE 3 CHRONIC KIDNEY DISEASE, WITH LONG-TERM CURRENT USE OF INSULIN (CMD): ICD-10-CM

## 2020-10-09 DIAGNOSIS — E66.01 MORBID OBESITY (CMD): ICD-10-CM

## 2020-10-09 DIAGNOSIS — E11.22 TYPE 2 DIABETES MELLITUS WITH STAGE 3A CHRONIC KIDNEY DISEASE, WITH LONG-TERM CURRENT USE OF INSULIN (CMD): ICD-10-CM

## 2020-10-09 DIAGNOSIS — Z79.4 TYPE 2 DIABETES MELLITUS WITH STAGE 3 CHRONIC KIDNEY DISEASE, WITH LONG-TERM CURRENT USE OF INSULIN (CMD): ICD-10-CM

## 2020-10-09 PROCEDURE — 86140 C-REACTIVE PROTEIN: CPT | Performed by: FAMILY MEDICINE

## 2020-10-09 PROCEDURE — 99000 SPECIMEN HANDLING OFFICE-LAB: CPT

## 2020-10-09 PROCEDURE — 82043 UR ALBUMIN QUANTITATIVE: CPT | Performed by: FAMILY MEDICINE

## 2020-10-09 PROCEDURE — 80061 LIPID PANEL: CPT | Performed by: FAMILY MEDICINE

## 2020-10-09 PROCEDURE — 36415 COLL VENOUS BLD VENIPUNCTURE: CPT

## 2020-10-09 PROCEDURE — 90471 IMMUNIZATION ADMIN: CPT

## 2020-10-09 PROCEDURE — 90662 IIV NO PRSV INCREASED AG IM: CPT

## 2020-10-09 PROCEDURE — 85652 RBC SED RATE AUTOMATED: CPT | Performed by: FAMILY MEDICINE

## 2020-10-09 PROCEDURE — 83036 HEMOGLOBIN GLYCOSYLATED A1C: CPT | Performed by: FAMILY MEDICINE

## 2020-10-09 PROCEDURE — 80050 GENERAL HEALTH PANEL: CPT | Performed by: FAMILY MEDICINE

## 2020-10-10 LAB
ALBUMIN SERPL-MCNC: 3.7 G/DL (ref 3.6–5.1)
ALBUMIN/GLOB SERPL: 0.8 {RATIO} (ref 1–2.4)
ALP SERPL-CCNC: 84 UNITS/L (ref 45–117)
ALT SERPL-CCNC: 30 UNITS/L
ANION GAP SERPL CALC-SCNC: 12 MMOL/L (ref 10–20)
AST SERPL-CCNC: 27 UNITS/L
BASOPHILS # BLD: 0.1 K/MCL (ref 0–0.3)
BASOPHILS NFR BLD: 1 %
BILIRUB SERPL-MCNC: 0.8 MG/DL (ref 0.2–1)
BUN SERPL-MCNC: 18 MG/DL (ref 6–20)
BUN/CREAT SERPL: 13 (ref 7–25)
CALCIUM SERPL-MCNC: 9 MG/DL (ref 8.4–10.2)
CHLORIDE SERPL-SCNC: 103 MMOL/L (ref 98–107)
CHOLEST SERPL-MCNC: 160 MG/DL
CHOLEST/HDLC SERPL: 2.7 {RATIO}
CO2 SERPL-SCNC: 27 MMOL/L (ref 21–32)
CREAT SERPL-MCNC: 1.4 MG/DL (ref 0.51–0.95)
CREAT UR-MCNC: 197 MG/DL
CRP SERPL-MCNC: 0.6 MG/DL
DIFFERENTIAL METHOD BLD: ABNORMAL
EOSINOPHIL # BLD: 0.2 K/MCL (ref 0.1–0.5)
EOSINOPHIL NFR BLD: 4 %
ERYTHROCYTE [DISTWIDTH] IN BLOOD: 14.4 % (ref 11–15)
ERYTHROCYTE [SEDIMENTATION RATE] IN BLOOD BY WESTERGREN METHOD: 38 MM/HR (ref 0–20)
GLOBULIN SER-MCNC: 4.8 G/DL (ref 2–4)
GLUCOSE SERPL-MCNC: 189 MG/DL (ref 65–99)
HBA1C MFR BLD: 9.7 % (ref 4.5–5.6)
HCT VFR BLD CALC: 45.6 % (ref 36–46.5)
HDLC SERPL-MCNC: 59 MG/DL
HGB BLD-MCNC: 14.2 G/DL (ref 12–15.5)
IMM GRANULOCYTES # BLD AUTO: 0 K/MCL (ref 0–0.2)
IMM GRANULOCYTES NFR BLD: 0 %
LDLC SERPL CALC-MCNC: 78 MG/DL
LENGTH OF FAST TIME PATIENT: ABNORMAL HRS
LENGTH OF FAST TIME PATIENT: NORMAL HRS
LYMPHOCYTES # BLD: 2.9 K/MCL (ref 1–4)
LYMPHOCYTES NFR BLD: 43 %
MCH RBC QN AUTO: 30.5 PG (ref 26–34)
MCHC RBC AUTO-ENTMCNC: 31.1 G/DL (ref 32–36.5)
MCV RBC AUTO: 97.9 FL (ref 78–100)
MICROALBUMIN UR-MCNC: 5.39 MG/DL
MICROALBUMIN/CREAT UR: 27.4 MG/G
MONOCYTES # BLD: 0.5 K/MCL (ref 0.3–0.9)
MONOCYTES NFR BLD: 8 %
NEUTROPHILS # BLD: 3 K/MCL (ref 1.8–7.7)
NEUTROPHILS NFR BLD: 44 %
NONHDLC SERPL-MCNC: 101 MG/DL
NRBC BLD MANUAL-RTO: 0 /100 WBC
PLATELET # BLD: 192 K/MCL (ref 140–450)
POTASSIUM SERPL-SCNC: 3.6 MMOL/L (ref 3.4–5.1)
PROT SERPL-MCNC: 8.5 G/DL (ref 6.4–8.2)
RBC # BLD: 4.66 MIL/MCL (ref 4–5.2)
SODIUM SERPL-SCNC: 138 MMOL/L (ref 135–145)
TRIGL SERPL-MCNC: 117 MG/DL
TSH SERPL-ACNC: 1.99 MCUNITS/ML (ref 0.35–5)
WBC # BLD: 6.8 K/MCL (ref 4.2–11)

## 2020-10-12 ENCOUNTER — TELEPHONE (OUTPATIENT)
Dept: FAMILY MEDICINE | Age: 70
End: 2020-10-12

## 2020-10-14 VITALS — BODY MASS INDEX: 53.37 KG/M2 | WEIGHT: 293 LBS

## 2020-10-14 RX ORDER — 0.9 % SODIUM CHLORIDE 0.9 %
20 VIAL (ML) INJECTION ONCE
Status: CANCELLED | OUTPATIENT
Start: 2020-10-15

## 2020-10-15 ENCOUNTER — OFFICE VISIT (OUTPATIENT)
Dept: INFUSION THERAPY | Age: 70
End: 2020-10-15
Attending: INTERNAL MEDICINE

## 2020-10-15 VITALS
SYSTOLIC BLOOD PRESSURE: 156 MMHG | DIASTOLIC BLOOD PRESSURE: 91 MMHG | TEMPERATURE: 97 F | BODY MASS INDEX: 53.6 KG/M2 | HEART RATE: 70 BPM | WEIGHT: 293 LBS

## 2020-10-15 DIAGNOSIS — M05.711 RHEUMATOID ARTHRITIS INVOLVING RIGHT SHOULDER WITH POSITIVE RHEUMATOID FACTOR (CMD): Primary | ICD-10-CM

## 2020-10-15 PROCEDURE — 10004651 HB RX, NO CHARGE ITEM: Performed by: INTERNAL MEDICINE

## 2020-10-15 PROCEDURE — 10002800 HB RX 250 W HCPCS: Performed by: INTERNAL MEDICINE

## 2020-10-15 PROCEDURE — 10002807 HB RX 258: Performed by: INTERNAL MEDICINE

## 2020-10-15 PROCEDURE — 96415 CHEMO IV INFUSION ADDL HR: CPT

## 2020-10-15 PROCEDURE — 96413 CHEMO IV INFUSION 1 HR: CPT

## 2020-10-15 RX ORDER — 0.9 % SODIUM CHLORIDE 0.9 %
20 VIAL (ML) INJECTION ONCE
Status: COMPLETED | OUTPATIENT
Start: 2020-10-15 | End: 2020-10-15

## 2020-10-15 RX ORDER — 0.9 % SODIUM CHLORIDE 0.9 %
20 VIAL (ML) INJECTION ONCE
Status: CANCELLED | OUTPATIENT
Start: 2020-12-09

## 2020-10-15 RX ADMIN — SODIUM CHLORIDE 20 ML: 9 INJECTION INTRAMUSCULAR; INTRAVENOUS; SUBCUTANEOUS at 13:00

## 2020-10-15 RX ADMIN — INFLIXIMAB 700 MG: 100 INJECTION, POWDER, LYOPHILIZED, FOR SOLUTION INTRAVENOUS at 10:55

## 2020-11-11 ENCOUNTER — APPOINTMENT (OUTPATIENT)
Dept: CARDIOLOGY | Age: 70
End: 2020-11-11

## 2020-11-17 ENCOUNTER — V-VISIT (OUTPATIENT)
Dept: FAMILY MEDICINE | Age: 70
End: 2020-11-17

## 2020-11-17 DIAGNOSIS — M25.552 CHRONIC LEFT HIP PAIN: Primary | ICD-10-CM

## 2020-11-17 DIAGNOSIS — G89.29 CHRONIC LEFT HIP PAIN: Primary | ICD-10-CM

## 2020-11-17 PROCEDURE — 99214 OFFICE O/P EST MOD 30 MIN: CPT | Performed by: FAMILY MEDICINE

## 2020-11-17 RX ORDER — DULAGLUTIDE 3 MG/.5ML
INJECTION, SOLUTION SUBCUTANEOUS
COMMUNITY
Start: 2020-10-26 | End: 2023-10-11 | Stop reason: DRUGHIGH

## 2020-11-24 ENCOUNTER — TELEPHONE (OUTPATIENT)
Dept: FAMILY MEDICINE | Age: 70
End: 2020-11-24

## 2020-11-24 DIAGNOSIS — M25.552 LEFT HIP PAIN: Primary | ICD-10-CM

## 2020-11-30 ENCOUNTER — TELEPHONE (OUTPATIENT)
Dept: FAMILY MEDICINE | Age: 70
End: 2020-11-30

## 2020-11-30 DIAGNOSIS — G89.29 CHRONIC PAIN OF LEFT KNEE: ICD-10-CM

## 2020-11-30 DIAGNOSIS — M25.562 CHRONIC PAIN OF LEFT KNEE: ICD-10-CM

## 2020-11-30 DIAGNOSIS — M79.605 LEFT LEG PAIN: Primary | ICD-10-CM

## 2020-12-01 ENCOUNTER — TELEPHONE (OUTPATIENT)
Dept: FAMILY MEDICINE | Age: 70
End: 2020-12-01

## 2020-12-01 ENCOUNTER — HOSPITAL ENCOUNTER (OUTPATIENT)
Dept: GENERAL RADIOLOGY | Facility: HOSPITAL | Age: 70
Discharge: HOME | End: 2020-12-01
Attending: FAMILY MEDICINE
Payer: COMMERCIAL

## 2020-12-01 ENCOUNTER — TELEPHONE (OUTPATIENT)
Dept: SCHEDULING | Age: 70
End: 2020-12-01

## 2020-12-01 DIAGNOSIS — M79.605 LEFT LEG PAIN: ICD-10-CM

## 2020-12-01 DIAGNOSIS — M71.9 BURSITIS: ICD-10-CM

## 2020-12-01 DIAGNOSIS — M25.552 CHRONIC LEFT HIP PAIN: ICD-10-CM

## 2020-12-01 DIAGNOSIS — G89.29 CHRONIC LEFT HIP PAIN: ICD-10-CM

## 2020-12-01 PROCEDURE — 73590 X-RAY EXAM OF LOWER LEG: CPT | Performed by: FAMILY MEDICINE

## 2020-12-01 PROCEDURE — 73562 X-RAY EXAM OF KNEE 3: CPT | Performed by: FAMILY MEDICINE

## 2020-12-01 PROCEDURE — 73600 X-RAY EXAM OF ANKLE: CPT | Performed by: FAMILY MEDICINE

## 2020-12-01 PROCEDURE — 73502 X-RAY EXAM HIP UNI 2-3 VIEWS: CPT | Performed by: FAMILY MEDICINE

## 2020-12-03 RX ORDER — VALSARTAN AND HYDROCHLOROTHIAZIDE 320; 12.5 MG/1; MG/1
1 TABLET, FILM COATED ORAL DAILY
Qty: 30 TABLET | Refills: 3 | Status: SHIPPED | OUTPATIENT
Start: 2020-12-03 | End: 2021-03-25 | Stop reason: SDUPTHER

## 2020-12-09 ENCOUNTER — OFFICE VISIT (OUTPATIENT)
Dept: RHEUMATOLOGY | Age: 70
End: 2020-12-09

## 2020-12-09 ENCOUNTER — OFFICE VISIT (OUTPATIENT)
Dept: INFUSION THERAPY | Age: 70
End: 2020-12-09
Attending: INTERNAL MEDICINE

## 2020-12-09 ENCOUNTER — APPOINTMENT (OUTPATIENT)
Dept: INFUSION THERAPY | Age: 70
End: 2020-12-09
Attending: INTERNAL MEDICINE

## 2020-12-09 VITALS
TEMPERATURE: 97.2 F | WEIGHT: 293 LBS | BODY MASS INDEX: 53.6 KG/M2 | HEART RATE: 97 BPM | SYSTOLIC BLOOD PRESSURE: 103 MMHG | DIASTOLIC BLOOD PRESSURE: 68 MMHG | RESPIRATION RATE: 17 BRPM | OXYGEN SATURATION: 98 %

## 2020-12-09 DIAGNOSIS — M70.62 TROCHANTERIC BURSITIS OF LEFT HIP: ICD-10-CM

## 2020-12-09 DIAGNOSIS — M05.711 RHEUMATOID ARTHRITIS INVOLVING RIGHT SHOULDER WITH POSITIVE RHEUMATOID FACTOR (CMD): Primary | ICD-10-CM

## 2020-12-09 PROCEDURE — 96415 CHEMO IV INFUSION ADDL HR: CPT

## 2020-12-09 PROCEDURE — 99214 OFFICE O/P EST MOD 30 MIN: CPT | Performed by: INTERNAL MEDICINE

## 2020-12-09 PROCEDURE — 10002800 HB RX 250 W HCPCS: Performed by: INTERNAL MEDICINE

## 2020-12-09 PROCEDURE — 96413 CHEMO IV INFUSION 1 HR: CPT

## 2020-12-09 PROCEDURE — 10002807 HB RX 258: Performed by: INTERNAL MEDICINE

## 2020-12-09 PROCEDURE — 3074F SYST BP LT 130 MM HG: CPT | Performed by: INTERNAL MEDICINE

## 2020-12-09 PROCEDURE — 10004651 HB RX, NO CHARGE ITEM: Performed by: INTERNAL MEDICINE

## 2020-12-09 PROCEDURE — 3078F DIAST BP <80 MM HG: CPT | Performed by: INTERNAL MEDICINE

## 2020-12-09 PROCEDURE — 20610 DRAIN/INJ JOINT/BURSA W/O US: CPT | Performed by: INTERNAL MEDICINE

## 2020-12-09 RX ORDER — 0.9 % SODIUM CHLORIDE 0.9 %
20 VIAL (ML) INJECTION ONCE
Status: COMPLETED | OUTPATIENT
Start: 2020-12-09 | End: 2020-12-09

## 2020-12-09 RX ORDER — TRIAMCINOLONE ACETONIDE 40 MG/ML
40 INJECTION, SUSPENSION INTRA-ARTICULAR; INTRAMUSCULAR ONCE
Status: DISCONTINUED | OUTPATIENT
Start: 2020-12-09 | End: 2022-02-15 | Stop reason: ALTCHOICE

## 2020-12-09 RX ORDER — 0.9 % SODIUM CHLORIDE 0.9 %
20 VIAL (ML) INJECTION ONCE
Status: CANCELLED | OUTPATIENT
Start: 2020-12-09

## 2020-12-09 RX ADMIN — INFLIXIMAB 700 MG: 100 INJECTION, POWDER, LYOPHILIZED, FOR SOLUTION INTRAVENOUS at 12:53

## 2020-12-09 RX ADMIN — SODIUM CHLORIDE, PRESERVATIVE FREE 20 ML: 5 INJECTION INTRAVENOUS at 14:59

## 2020-12-09 ASSESSMENT — JOINT PAIN
TOTAL NUMBER OF SWOLLEN JOINTS: 0
TOTAL NUMBER OF TENDER JOINTS: 1

## 2020-12-09 ASSESSMENT — DISEASE ACTIVITY SCORE (DAS28)
TOTAL_SCORE_CRP: 3.22
CRP_MG_PER_LITER: 1.8

## 2020-12-09 ASSESSMENT — PAIN SCALES - GENERAL: PAINLEVEL: 7-8

## 2020-12-10 ENCOUNTER — APPOINTMENT (OUTPATIENT)
Dept: INFUSION THERAPY | Age: 70
End: 2020-12-10
Attending: INTERNAL MEDICINE

## 2020-12-10 DIAGNOSIS — G89.29 CHRONIC LEFT HIP PAIN: ICD-10-CM

## 2020-12-10 DIAGNOSIS — M25.552 CHRONIC LEFT HIP PAIN: ICD-10-CM

## 2020-12-14 ENCOUNTER — CLINICAL ABSTRACT (OUTPATIENT)
Dept: RHEUMATOLOGY | Age: 70
End: 2020-12-14

## 2020-12-18 ENCOUNTER — EXTERNAL RECORD (OUTPATIENT)
Dept: HEALTH INFORMATION MANAGEMENT | Facility: OTHER | Age: 70
End: 2020-12-18

## 2020-12-18 LAB
HM DILATED EYE EXAM: NORMAL
RETINOPATHY PRESENT (RTP): NO

## 2020-12-19 ENCOUNTER — TELEPHONE (OUTPATIENT)
Dept: FAMILY MEDICINE | Age: 70
End: 2020-12-19

## 2020-12-19 DIAGNOSIS — G89.29 CHRONIC LEFT HIP PAIN: ICD-10-CM

## 2020-12-19 DIAGNOSIS — M25.552 CHRONIC LEFT HIP PAIN: ICD-10-CM

## 2020-12-19 DIAGNOSIS — M06.9 RHEUMATOID ARTHRITIS (CMD): ICD-10-CM

## 2020-12-21 RX ORDER — FOLIC ACID 1 MG/1
1000 TABLET ORAL DAILY
Qty: 30 TABLET | Refills: 2 | Status: SHIPPED | OUTPATIENT
Start: 2020-12-21 | End: 2021-02-01 | Stop reason: SDUPTHER

## 2020-12-21 RX ORDER — SIMVASTATIN 40 MG
40 TABLET ORAL EVERY EVENING
Qty: 90 TABLET | Refills: 0 | Status: SHIPPED | OUTPATIENT
Start: 2020-12-21 | End: 2021-03-25 | Stop reason: ALTCHOICE

## 2020-12-21 RX ORDER — LANCETS
EACH MISCELLANEOUS
Qty: 100 EACH | Refills: 3 | Status: SHIPPED | OUTPATIENT
Start: 2020-12-21 | End: 2023-02-13 | Stop reason: SDUPTHER

## 2020-12-28 ENCOUNTER — DOCUMENTATION (OUTPATIENT)
Dept: FAMILY MEDICINE | Age: 70
End: 2020-12-28

## 2021-01-01 ENCOUNTER — EXTERNAL RECORD (OUTPATIENT)
Dept: HEALTH INFORMATION MANAGEMENT | Facility: OTHER | Age: 71
End: 2021-01-01

## 2021-01-01 ENCOUNTER — EXTERNAL RECORD (OUTPATIENT)
Dept: OTHER | Age: 71
End: 2021-01-01

## 2021-01-12 ENCOUNTER — V-VISIT (OUTPATIENT)
Dept: FAMILY MEDICINE | Age: 71
End: 2021-01-12

## 2021-01-12 DIAGNOSIS — Z20.822 SUSPECTED COVID-19 VIRUS INFECTION: Primary | ICD-10-CM

## 2021-01-12 PROCEDURE — 99213 OFFICE O/P EST LOW 20 MIN: CPT | Performed by: FAMILY MEDICINE

## 2021-01-12 SDOH — HEALTH STABILITY: MENTAL HEALTH: HOW OFTEN DO YOU HAVE A DRINK CONTAINING ALCOHOL?: NEVER

## 2021-01-12 ASSESSMENT — PATIENT HEALTH QUESTIONNAIRE - PHQ9
SUM OF ALL RESPONSES TO PHQ9 QUESTIONS 1 AND 2: 0
SUM OF ALL RESPONSES TO PHQ9 QUESTIONS 1 AND 2: 0
CLINICAL INTERPRETATION OF PHQ2 SCORE: NO FURTHER SCREENING NEEDED
2. FEELING DOWN, DEPRESSED OR HOPELESS: NOT AT ALL
1. LITTLE INTEREST OR PLEASURE IN DOING THINGS: NOT AT ALL
CLINICAL INTERPRETATION OF PHQ9 SCORE: NO FURTHER SCREENING NEEDED

## 2021-01-12 ASSESSMENT — ENCOUNTER SYMPTOMS
WHEEZING: 0
FATIGUE: 1
COUGH: 1
SLEEP DISTURBANCE: 0
SHORTNESS OF BREATH: 0
HEADACHES: 0
VOMITING: 0
LIGHT-HEADEDNESS: 0
CONSTIPATION: 0
DIZZINESS: 0
SORE THROAT: 0
CHILLS: 0
RHINORRHEA: 1
DIARRHEA: 0
NAUSEA: 0
FEVER: 0

## 2021-01-13 LAB
SARS-COV-2 RNA SPEC QL NAA+PROBE: DETECTED
SPECIMEN SOURCE: ABNORMAL

## 2021-01-15 ENCOUNTER — E-ADVICE (OUTPATIENT)
Dept: CARDIOLOGY | Age: 71
End: 2021-01-15

## 2021-01-18 ENCOUNTER — TELEPHONE (OUTPATIENT)
Dept: CARDIOLOGY | Age: 71
End: 2021-01-18

## 2021-01-18 PROBLEM — U07.1 COVID-19 VIRUS INFECTION: Status: ACTIVE | Noted: 2021-01-18

## 2021-01-20 ENCOUNTER — TELEPHONE (OUTPATIENT)
Dept: FAMILY MEDICINE | Age: 71
End: 2021-01-20

## 2021-01-20 DIAGNOSIS — M25.552 CHRONIC LEFT HIP PAIN: ICD-10-CM

## 2021-01-20 DIAGNOSIS — G89.29 CHRONIC LEFT HIP PAIN: ICD-10-CM

## 2021-01-21 ENCOUNTER — CLINICAL ABSTRACT (OUTPATIENT)
Dept: HEALTH INFORMATION MANAGEMENT | Age: 71
End: 2021-01-21

## 2021-01-27 RX ORDER — FUROSEMIDE 20 MG/1
TABLET ORAL
Qty: 90 TABLET | Refills: 0 | Status: SHIPPED | OUTPATIENT
Start: 2021-01-27 | End: 2021-04-25

## 2021-02-01 ENCOUNTER — TELEPHONE (OUTPATIENT)
Dept: FAMILY MEDICINE | Age: 71
End: 2021-02-01

## 2021-02-01 RX ORDER — FOLIC ACID 1 MG/1
1000 TABLET ORAL DAILY
Qty: 90 TABLET | Refills: 2 | Status: SHIPPED | OUTPATIENT
Start: 2021-02-01 | End: 2021-02-10 | Stop reason: SDUPTHER

## 2021-02-02 ENCOUNTER — CLINICAL ABSTRACT (OUTPATIENT)
Dept: RHEUMATOLOGY | Age: 71
End: 2021-02-02

## 2021-02-02 RX ORDER — DIPHENHYDRAMINE HYDROCHLORIDE 50 MG/ML
12.5 INJECTION INTRAMUSCULAR; INTRAVENOUS ONCE
Status: CANCELLED
Start: 2021-02-03

## 2021-02-02 RX ORDER — DIPHENHYDRAMINE HYDROCHLORIDE 50 MG/ML
12.5 INJECTION INTRAMUSCULAR; INTRAVENOUS ONCE
Status: CANCELLED
Start: 2021-02-04

## 2021-02-03 DIAGNOSIS — Z23 NEED FOR VACCINATION: ICD-10-CM

## 2021-02-04 ENCOUNTER — OFFICE VISIT (OUTPATIENT)
Dept: INFUSION THERAPY | Age: 71
End: 2021-02-04
Attending: INTERNAL MEDICINE

## 2021-02-04 DIAGNOSIS — M05.711 RHEUMATOID ARTHRITIS INVOLVING RIGHT SHOULDER WITH POSITIVE RHEUMATOID FACTOR (CMD): Primary | ICD-10-CM

## 2021-02-04 PROCEDURE — 10002807 HB RX 258: Performed by: INTERNAL MEDICINE

## 2021-02-04 PROCEDURE — 96375 TX/PRO/DX INJ NEW DRUG ADDON: CPT

## 2021-02-04 PROCEDURE — 96413 CHEMO IV INFUSION 1 HR: CPT

## 2021-02-04 PROCEDURE — 10002800 HB RX 250 W HCPCS: Performed by: INTERNAL MEDICINE

## 2021-02-04 PROCEDURE — 96415 CHEMO IV INFUSION ADDL HR: CPT

## 2021-02-04 PROCEDURE — 10004651 HB RX, NO CHARGE ITEM: Performed by: INTERNAL MEDICINE

## 2021-02-04 RX ORDER — 0.9 % SODIUM CHLORIDE 0.9 %
20 VIAL (ML) INJECTION ONCE
Status: CANCELLED | OUTPATIENT
Start: 2021-02-04

## 2021-02-04 RX ORDER — DIPHENHYDRAMINE HYDROCHLORIDE 50 MG/ML
12.5 INJECTION INTRAMUSCULAR; INTRAVENOUS ONCE
Status: COMPLETED | OUTPATIENT
Start: 2021-02-04 | End: 2021-02-04

## 2021-02-04 RX ORDER — DIPHENHYDRAMINE HYDROCHLORIDE 50 MG/ML
12.5 INJECTION INTRAMUSCULAR; INTRAVENOUS ONCE
Status: CANCELLED
Start: 2021-04-01

## 2021-02-04 RX ORDER — 0.9 % SODIUM CHLORIDE 0.9 %
20 VIAL (ML) INJECTION ONCE
Status: COMPLETED | OUTPATIENT
Start: 2021-02-04 | End: 2021-02-04

## 2021-02-04 RX ADMIN — DIPHENHYDRAMINE HYDROCHLORIDE 12.5 MG: 50 INJECTION INTRAMUSCULAR; INTRAVENOUS at 10:43

## 2021-02-04 RX ADMIN — INFLIXIMAB 700 MG: 100 INJECTION, POWDER, LYOPHILIZED, FOR SOLUTION INTRAVENOUS at 11:03

## 2021-02-04 RX ADMIN — SODIUM CHLORIDE 20 ML: 9 INJECTION INTRAMUSCULAR; INTRAVENOUS; SUBCUTANEOUS at 13:09

## 2021-02-04 ASSESSMENT — PAIN SCALES - GENERAL: PAINLEVEL: 0

## 2021-02-10 ENCOUNTER — TELEPHONE (OUTPATIENT)
Dept: FAMILY MEDICINE | Age: 71
End: 2021-02-10

## 2021-02-10 RX ORDER — FOLIC ACID 1 MG/1
1000 TABLET ORAL DAILY
Qty: 90 TABLET | Refills: 2 | Status: SHIPPED | OUTPATIENT
Start: 2021-02-10 | End: 2021-11-12 | Stop reason: SDUPTHER

## 2021-02-11 ENCOUNTER — TELEPHONE (OUTPATIENT)
Dept: FAMILY MEDICINE | Age: 71
End: 2021-02-11

## 2021-02-17 ENCOUNTER — APPOINTMENT (OUTPATIENT)
Dept: CARDIOLOGY | Age: 71
End: 2021-02-17

## 2021-02-25 ENCOUNTER — LAB SERVICES (OUTPATIENT)
Dept: INTERNAL MEDICINE | Age: 71
End: 2021-02-25

## 2021-02-25 ENCOUNTER — OFFICE VISIT (OUTPATIENT)
Dept: FAMILY MEDICINE | Age: 71
End: 2021-02-25

## 2021-02-25 DIAGNOSIS — Z00.00 ANNUAL PHYSICAL EXAM: Primary | ICD-10-CM

## 2021-02-25 DIAGNOSIS — E11.22 DIABETES MELLITUS WITH STAGE 3 CHRONIC KIDNEY DISEASE (CMD): ICD-10-CM

## 2021-02-25 DIAGNOSIS — N18.30 DIABETES MELLITUS WITH STAGE 3 CHRONIC KIDNEY DISEASE (CMD): ICD-10-CM

## 2021-02-25 DIAGNOSIS — I10 BENIGN ESSENTIAL HYPERTENSION: ICD-10-CM

## 2021-02-25 DIAGNOSIS — E78.5 HYPERLIPIDEMIA, UNSPECIFIED HYPERLIPIDEMIA TYPE: ICD-10-CM

## 2021-02-25 DIAGNOSIS — Z12.11 COLON CANCER SCREENING: ICD-10-CM

## 2021-02-25 DIAGNOSIS — Z23 NEED FOR PNEUMOCOCCAL VACCINATION: ICD-10-CM

## 2021-02-25 DIAGNOSIS — Z00.00 ANNUAL PHYSICAL EXAM: ICD-10-CM

## 2021-02-25 LAB
BASOPHILS # BLD: 0.1 K/MCL (ref 0–0.3)
BASOPHILS NFR BLD: 1 %
DEPRECATED RDW RBC: 59.1 FL (ref 39–50)
EOSINOPHIL # BLD: 0.2 K/MCL (ref 0–0.5)
EOSINOPHIL NFR BLD: 3 %
ERYTHROCYTE [DISTWIDTH] IN BLOOD: 16.1 % (ref 11–15)
HBA1C MFR BLD: 6.7 % (ref 4.5–5.6)
HCT VFR BLD CALC: 44 % (ref 36–46.5)
HGB BLD-MCNC: 13.9 G/DL (ref 12–15.5)
IMM GRANULOCYTES # BLD AUTO: 0 K/MCL (ref 0–0.2)
IMM GRANULOCYTES # BLD: 0 %
LYMPHOCYTES # BLD: 2.1 K/MCL (ref 1–4)
LYMPHOCYTES NFR BLD: 37 %
MCH RBC QN AUTO: 31.2 PG (ref 26–34)
MCHC RBC AUTO-ENTMCNC: 31.6 G/DL (ref 32–36.5)
MCV RBC AUTO: 98.9 FL (ref 78–100)
MONOCYTES # BLD: 0.5 K/MCL (ref 0.3–0.9)
MONOCYTES NFR BLD: 8 %
NEUTROPHILS # BLD: 2.8 K/MCL (ref 1.8–7.7)
NEUTROPHILS NFR BLD: 51 %
NRBC BLD MANUAL-RTO: 0 /100 WBC
PLATELET # BLD AUTO: 204 K/MCL (ref 140–450)
RBC # BLD: 4.45 MIL/MCL (ref 4–5.2)
WBC # BLD: 5.6 K/MCL (ref 4.2–11)

## 2021-02-25 PROCEDURE — 90471 IMMUNIZATION ADMIN: CPT

## 2021-02-25 PROCEDURE — 80050 GENERAL HEALTH PANEL: CPT | Performed by: FAMILY MEDICINE

## 2021-02-25 PROCEDURE — 82570 ASSAY OF URINE CREATININE: CPT | Performed by: FAMILY MEDICINE

## 2021-02-25 PROCEDURE — 3078F DIAST BP <80 MM HG: CPT | Performed by: FAMILY MEDICINE

## 2021-02-25 PROCEDURE — 80061 LIPID PANEL: CPT | Performed by: FAMILY MEDICINE

## 2021-02-25 PROCEDURE — 99000 SPECIMEN HANDLING OFFICE-LAB: CPT

## 2021-02-25 PROCEDURE — 36415 COLL VENOUS BLD VENIPUNCTURE: CPT

## 2021-02-25 PROCEDURE — 99397 PER PM REEVAL EST PAT 65+ YR: CPT | Performed by: FAMILY MEDICINE

## 2021-02-25 PROCEDURE — 3074F SYST BP LT 130 MM HG: CPT | Performed by: FAMILY MEDICINE

## 2021-02-25 PROCEDURE — 82043 UR ALBUMIN QUANTITATIVE: CPT | Performed by: FAMILY MEDICINE

## 2021-02-25 PROCEDURE — 83036 HEMOGLOBIN GLYCOSYLATED A1C: CPT | Performed by: FAMILY MEDICINE

## 2021-02-25 PROCEDURE — 90732 PPSV23 VACC 2 YRS+ SUBQ/IM: CPT

## 2021-02-25 SDOH — HEALTH STABILITY: MENTAL HEALTH: HOW OFTEN DO YOU HAVE A DRINK CONTAINING ALCOHOL?: NEVER

## 2021-02-25 ASSESSMENT — COGNITIVE AND FUNCTIONAL STATUS - GENERAL
DO YOU HAVE DIFFICULTY DRESSING OR BATHING: NO
BECAUSE OF A PHYSICAL, MENTAL, OR EMOTIONAL CONDITION, DO YOU HAVE SERIOUS DIFFICULTY CONCENTRATING, REMEMBERING OR MAKING DECISIONS: NO
BECAUSE OF A PHYSICAL, MENTAL, OR EMOTIONAL CONDITION, DO YOU HAVE DIFFICULTY DOING ERRANDS ALONE: NO
DO YOU HAVE SERIOUS DIFFICULTY WALKING OR CLIMBING STAIRS: YES

## 2021-02-25 ASSESSMENT — PAIN SCALES - GENERAL: PAINLEVEL: 0

## 2021-02-25 ASSESSMENT — PATIENT HEALTH QUESTIONNAIRE - PHQ9
SUM OF ALL RESPONSES TO PHQ9 QUESTIONS 1 AND 2: 0
1. LITTLE INTEREST OR PLEASURE IN DOING THINGS: NOT AT ALL
2. FEELING DOWN, DEPRESSED OR HOPELESS: NOT AT ALL
CLINICAL INTERPRETATION OF PHQ9 SCORE: NO FURTHER SCREENING NEEDED
SUM OF ALL RESPONSES TO PHQ9 QUESTIONS 1 AND 2: 0
CLINICAL INTERPRETATION OF PHQ2 SCORE: NO FURTHER SCREENING NEEDED

## 2021-02-26 ENCOUNTER — TELEPHONE (OUTPATIENT)
Dept: FAMILY MEDICINE | Age: 71
End: 2021-02-26

## 2021-02-26 LAB
ALBUMIN SERPL-MCNC: 3.4 G/DL (ref 3.6–5.1)
ALBUMIN/GLOB SERPL: 0.7 {RATIO} (ref 1–2.4)
ALP SERPL-CCNC: 73 UNITS/L (ref 45–117)
ALT SERPL-CCNC: 24 UNITS/L
ANION GAP SERPL CALC-SCNC: 10 MMOL/L (ref 10–20)
AST SERPL-CCNC: 23 UNITS/L
BILIRUB SERPL-MCNC: 0.5 MG/DL (ref 0.2–1)
BUN SERPL-MCNC: 19 MG/DL (ref 6–20)
BUN/CREAT SERPL: 15 (ref 7–25)
CALCIUM SERPL-MCNC: 9.3 MG/DL (ref 8.4–10.2)
CHLORIDE SERPL-SCNC: 106 MMOL/L (ref 98–107)
CHOLEST SERPL-MCNC: 172 MG/DL
CHOLEST/HDLC SERPL: 2.9 {RATIO}
CO2 SERPL-SCNC: 28 MMOL/L (ref 21–32)
CREAT SERPL-MCNC: 1.26 MG/DL (ref 0.51–0.95)
CREAT UR-MCNC: 175 MG/DL
FASTING DURATION TIME PATIENT: 10 HOURS
FASTING DURATION TIME PATIENT: NORMAL H
GFR SERPLBLD BASED ON 1.73 SQ M-ARVRAT: 51 ML/MIN/1.73M2
GLOBULIN SER-MCNC: 4.7 G/DL (ref 2–4)
GLUCOSE SERPL-MCNC: 160 MG/DL (ref 65–99)
HDLC SERPL-MCNC: 60 MG/DL
LDLC SERPL CALC-MCNC: 92 MG/DL
MICROALBUMIN UR-MCNC: 4.61 MG/DL
MICROALBUMIN/CREAT UR: 26.3 MG/G
NONHDLC SERPL-MCNC: 112 MG/DL
POTASSIUM SERPL-SCNC: 4 MMOL/L (ref 3.4–5.1)
PROT SERPL-MCNC: 8.1 G/DL (ref 6.4–8.2)
SODIUM SERPL-SCNC: 140 MMOL/L (ref 135–145)
TRIGL SERPL-MCNC: 98 MG/DL
TSH SERPL-ACNC: 3.06 MCUNITS/ML (ref 0.35–5)

## 2021-03-01 ENCOUNTER — TELEPHONE (OUTPATIENT)
Dept: FAMILY MEDICINE | Age: 71
End: 2021-03-01

## 2021-03-18 DIAGNOSIS — M06.9 RHEUMATOID ARTHRITIS (CMD): ICD-10-CM

## 2021-03-18 RX ORDER — METHOTREXATE 2.5 MG/1
TABLET ORAL
Qty: 72 TABLET | Refills: 0 | Status: SHIPPED | OUTPATIENT
Start: 2021-03-18 | End: 2021-06-16

## 2021-03-24 ENCOUNTER — OFFICE VISIT (OUTPATIENT)
Dept: CARDIOLOGY | Age: 71
End: 2021-03-24

## 2021-03-24 VITALS
BODY MASS INDEX: 43.4 KG/M2 | HEIGHT: 69 IN | RESPIRATION RATE: 18 BRPM | HEART RATE: 85 BPM | WEIGHT: 293 LBS | SYSTOLIC BLOOD PRESSURE: 142 MMHG | DIASTOLIC BLOOD PRESSURE: 80 MMHG | OXYGEN SATURATION: 96 %

## 2021-03-24 DIAGNOSIS — E78.00 PURE HYPERCHOLESTEROLEMIA: ICD-10-CM

## 2021-03-24 DIAGNOSIS — R06.02 SOB (SHORTNESS OF BREATH): Primary | ICD-10-CM

## 2021-03-24 DIAGNOSIS — R06.09 DYSPNEA ON EXERTION: ICD-10-CM

## 2021-03-24 PROCEDURE — 99244 OFF/OP CNSLTJ NEW/EST MOD 40: CPT | Performed by: INTERNAL MEDICINE

## 2021-03-24 PROCEDURE — 3079F DIAST BP 80-89 MM HG: CPT | Performed by: INTERNAL MEDICINE

## 2021-03-24 PROCEDURE — 93000 ELECTROCARDIOGRAM COMPLETE: CPT | Performed by: INTERNAL MEDICINE

## 2021-03-24 PROCEDURE — 3077F SYST BP >= 140 MM HG: CPT | Performed by: INTERNAL MEDICINE

## 2021-03-24 ASSESSMENT — PATIENT HEALTH QUESTIONNAIRE - PHQ9
SUM OF ALL RESPONSES TO PHQ9 QUESTIONS 1 AND 2: 0
CLINICAL INTERPRETATION OF PHQ9 SCORE: NO FURTHER SCREENING NEEDED
1. LITTLE INTEREST OR PLEASURE IN DOING THINGS: NOT AT ALL
2. FEELING DOWN, DEPRESSED OR HOPELESS: NOT AT ALL
SUM OF ALL RESPONSES TO PHQ9 QUESTIONS 1 AND 2: 0
CLINICAL INTERPRETATION OF PHQ2 SCORE: NO FURTHER SCREENING NEEDED

## 2021-03-25 ENCOUNTER — TELEPHONE (OUTPATIENT)
Dept: FAMILY MEDICINE | Age: 71
End: 2021-03-25

## 2021-03-25 DIAGNOSIS — E78.5 DYSLIPIDEMIA: Primary | ICD-10-CM

## 2021-03-25 RX ORDER — ATORVASTATIN CALCIUM 40 MG/1
40 TABLET, FILM COATED ORAL DAILY
Qty: 90 TABLET | Refills: 0 | Status: SHIPPED | OUTPATIENT
Start: 2021-03-25 | End: 2021-06-09

## 2021-03-25 RX ORDER — VALSARTAN AND HYDROCHLOROTHIAZIDE 320; 12.5 MG/1; MG/1
1 TABLET, FILM COATED ORAL DAILY
Qty: 90 TABLET | Refills: 3 | Status: SHIPPED | OUTPATIENT
Start: 2021-03-25 | End: 2022-03-29 | Stop reason: SDUPTHER

## 2021-04-01 ENCOUNTER — OFFICE VISIT (OUTPATIENT)
Dept: INFUSION THERAPY | Age: 71
End: 2021-04-01
Attending: INTERNAL MEDICINE

## 2021-04-01 DIAGNOSIS — M05.711 RHEUMATOID ARTHRITIS INVOLVING RIGHT SHOULDER WITH POSITIVE RHEUMATOID FACTOR (CMD): Primary | ICD-10-CM

## 2021-04-01 PROCEDURE — 96415 CHEMO IV INFUSION ADDL HR: CPT

## 2021-04-01 PROCEDURE — 10002800 HB RX 250 W HCPCS: Performed by: INTERNAL MEDICINE

## 2021-04-01 PROCEDURE — 10002807 HB RX 258: Performed by: INTERNAL MEDICINE

## 2021-04-01 PROCEDURE — 10004651 HB RX, NO CHARGE ITEM: Performed by: INTERNAL MEDICINE

## 2021-04-01 PROCEDURE — 96375 TX/PRO/DX INJ NEW DRUG ADDON: CPT

## 2021-04-01 PROCEDURE — 96413 CHEMO IV INFUSION 1 HR: CPT

## 2021-04-01 RX ORDER — DIPHENHYDRAMINE HYDROCHLORIDE 50 MG/ML
12.5 INJECTION INTRAMUSCULAR; INTRAVENOUS ONCE
Status: COMPLETED | OUTPATIENT
Start: 2021-04-01 | End: 2021-04-01

## 2021-04-01 RX ORDER — DIPHENHYDRAMINE HYDROCHLORIDE 50 MG/ML
12.5 INJECTION INTRAMUSCULAR; INTRAVENOUS ONCE
Status: CANCELLED
Start: 2021-05-27 | End: 2021-04-01

## 2021-04-01 RX ORDER — 0.9 % SODIUM CHLORIDE 0.9 %
20 VIAL (ML) INJECTION ONCE
Status: CANCELLED | OUTPATIENT
Start: 2021-04-01 | End: 2021-04-01

## 2021-04-01 RX ORDER — 0.9 % SODIUM CHLORIDE 0.9 %
20 VIAL (ML) INJECTION ONCE
Status: COMPLETED | OUTPATIENT
Start: 2021-04-01 | End: 2021-04-01

## 2021-04-01 RX ADMIN — DIPHENHYDRAMINE HYDROCHLORIDE 12.5 MG: 50 INJECTION INTRAMUSCULAR; INTRAVENOUS at 10:17

## 2021-04-01 RX ADMIN — SODIUM CHLORIDE, PRESERVATIVE FREE 20 ML: 5 INJECTION INTRAVENOUS at 12:56

## 2021-04-01 RX ADMIN — INFLIXIMAB 700 MG: 100 INJECTION, POWDER, LYOPHILIZED, FOR SOLUTION INTRAVENOUS at 10:51

## 2021-04-01 ASSESSMENT — PAIN SCALES - GENERAL: PAINLEVEL: 0

## 2021-04-10 ENCOUNTER — IMMUNIZATION (OUTPATIENT)
Dept: LAB | Facility: HOSPITAL | Age: 71
End: 2021-04-10
Attending: HOSPITALIST
Payer: COMMERCIAL

## 2021-04-10 DIAGNOSIS — Z23 NEED FOR VACCINATION: Primary | ICD-10-CM

## 2021-04-10 PROCEDURE — 0011A SARSCOV2 VAC 100MCG/0.5ML IM: CPT

## 2021-04-14 ENCOUNTER — LAB SERVICES (OUTPATIENT)
Dept: INTERNAL MEDICINE | Age: 71
End: 2021-04-14

## 2021-04-14 DIAGNOSIS — E78.5 DYSLIPIDEMIA: ICD-10-CM

## 2021-04-14 DIAGNOSIS — Z12.11 COLON CANCER SCREENING: Primary | ICD-10-CM

## 2021-04-14 PROCEDURE — 82274 ASSAY TEST FOR BLOOD FECAL: CPT | Performed by: FAMILY MEDICINE

## 2021-04-14 PROCEDURE — 99000 SPECIMEN HANDLING OFFICE-LAB: CPT | Performed by: FAMILY MEDICINE

## 2021-04-15 ENCOUNTER — TELEPHONE (OUTPATIENT)
Dept: GASTROENTEROLOGY | Facility: CLINIC | Age: 71
End: 2021-04-15

## 2021-04-15 NOTE — TELEPHONE ENCOUNTER
Spoke with patient. Verified . Advised patient office visit is needed, as last office with Dr. Clif Ellison was last year and yearly follow ups are recommended in order to refill medications.      Patient states she established a new gi provider thro

## 2021-04-15 NOTE — TELEPHONE ENCOUNTER
Current Outpatient Medications   Medication Sig Dispense Refill   • omeprazole 20 MG Oral Capsule Delayed Release Take 1 capsule (20 mg total) by mouth every morning.  90 capsule 3

## 2021-04-16 ENCOUNTER — TELEPHONE (OUTPATIENT)
Dept: FAMILY MEDICINE | Age: 71
End: 2021-04-16

## 2021-04-16 LAB — HEMOCCULT STL QL: NEGATIVE

## 2021-04-16 RX ORDER — OMEPRAZOLE 20 MG/1
20 CAPSULE, DELAYED RELEASE ORAL DAILY
Qty: 90 CAPSULE | Refills: 0 | Status: SHIPPED | OUTPATIENT
Start: 2021-04-16 | End: 2021-06-29 | Stop reason: SDUPTHER

## 2021-04-22 RX ORDER — METOPROLOL SUCCINATE 50 MG/1
50 TABLET, EXTENDED RELEASE ORAL DAILY
Qty: 90 TABLET | Refills: 0 | Status: SHIPPED | OUTPATIENT
Start: 2021-04-22 | End: 2021-06-29 | Stop reason: SDUPTHER

## 2021-04-25 RX ORDER — FUROSEMIDE 20 MG/1
TABLET ORAL
Qty: 90 TABLET | Refills: 0 | Status: SHIPPED | OUTPATIENT
Start: 2021-04-25 | End: 2021-06-29 | Stop reason: SDUPTHER

## 2021-05-08 ENCOUNTER — IMMUNIZATION (OUTPATIENT)
Dept: LAB | Facility: HOSPITAL | Age: 71
End: 2021-05-08
Attending: EMERGENCY MEDICINE
Payer: COMMERCIAL

## 2021-05-08 DIAGNOSIS — Z23 NEED FOR VACCINATION: Primary | ICD-10-CM

## 2021-05-08 PROCEDURE — 0012A SARSCOV2 VAC 100MCG/0.5ML IM: CPT

## 2021-05-25 VITALS
WEIGHT: 293 LBS | DIASTOLIC BLOOD PRESSURE: 66 MMHG | BODY MASS INDEX: 44.41 KG/M2 | DIASTOLIC BLOOD PRESSURE: 80 MMHG | OXYGEN SATURATION: 99 % | RESPIRATION RATE: 18 BRPM | RESPIRATION RATE: 18 BRPM | WEIGHT: 293 LBS | HEART RATE: 87 BPM | HEIGHT: 68 IN | BODY MASS INDEX: 50.81 KG/M2 | TEMPERATURE: 97 F | SYSTOLIC BLOOD PRESSURE: 118 MMHG | RESPIRATION RATE: 16 BRPM | SYSTOLIC BLOOD PRESSURE: 143 MMHG | WEIGHT: 293 LBS | HEIGHT: 68 IN | SYSTOLIC BLOOD PRESSURE: 104 MMHG | BODY MASS INDEX: 44.41 KG/M2 | BODY MASS INDEX: 44.41 KG/M2 | WEIGHT: 293 LBS | BODY MASS INDEX: 50.42 KG/M2 | DIASTOLIC BLOOD PRESSURE: 83 MMHG | SYSTOLIC BLOOD PRESSURE: 101 MMHG | TEMPERATURE: 96.5 F | DIASTOLIC BLOOD PRESSURE: 70 MMHG | DIASTOLIC BLOOD PRESSURE: 84 MMHG | WEIGHT: 293 LBS | HEART RATE: 103 BPM | BODY MASS INDEX: 44.41 KG/M2 | HEART RATE: 80 BPM | HEART RATE: 74 BPM | WEIGHT: 293 LBS | BODY MASS INDEX: 44.41 KG/M2 | DIASTOLIC BLOOD PRESSURE: 76 MMHG | OXYGEN SATURATION: 97 % | SYSTOLIC BLOOD PRESSURE: 126 MMHG | HEART RATE: 88 BPM | RESPIRATION RATE: 18 BRPM | DIASTOLIC BLOOD PRESSURE: 64 MMHG | WEIGHT: 293 LBS | HEIGHT: 69 IN | HEART RATE: 88 BPM | HEART RATE: 86 BPM | HEIGHT: 68 IN | TEMPERATURE: 98.4 F | DIASTOLIC BLOOD PRESSURE: 76 MMHG | HEIGHT: 68 IN | BODY MASS INDEX: 43.4 KG/M2 | RESPIRATION RATE: 20 BRPM | OXYGEN SATURATION: 99 % | HEIGHT: 68 IN | TEMPERATURE: 97.6 F | RESPIRATION RATE: 15 BRPM | TEMPERATURE: 97.4 F | SYSTOLIC BLOOD PRESSURE: 128 MMHG | TEMPERATURE: 97.9 F | SYSTOLIC BLOOD PRESSURE: 125 MMHG | RESPIRATION RATE: 20 BRPM | TEMPERATURE: 95.8 F | HEART RATE: 87 BPM | TEMPERATURE: 98.1 F | SYSTOLIC BLOOD PRESSURE: 124 MMHG | OXYGEN SATURATION: 97 % | WEIGHT: 293 LBS

## 2021-05-27 ENCOUNTER — OFFICE VISIT (OUTPATIENT)
Dept: INFUSION THERAPY | Age: 71
End: 2021-05-27
Attending: INTERNAL MEDICINE

## 2021-05-27 VITALS
TEMPERATURE: 97.6 F | WEIGHT: 293 LBS | DIASTOLIC BLOOD PRESSURE: 86 MMHG | RESPIRATION RATE: 15 BRPM | SYSTOLIC BLOOD PRESSURE: 149 MMHG | HEART RATE: 75 BPM | BODY MASS INDEX: 51.07 KG/M2 | OXYGEN SATURATION: 97 %

## 2021-05-27 DIAGNOSIS — M05.711 RHEUMATOID ARTHRITIS INVOLVING RIGHT SHOULDER WITH POSITIVE RHEUMATOID FACTOR (CMD): Primary | ICD-10-CM

## 2021-05-27 PROCEDURE — 96375 TX/PRO/DX INJ NEW DRUG ADDON: CPT

## 2021-05-27 PROCEDURE — 10002800 HB RX 250 W HCPCS: Performed by: INTERNAL MEDICINE

## 2021-05-27 PROCEDURE — 96415 CHEMO IV INFUSION ADDL HR: CPT

## 2021-05-27 PROCEDURE — 96413 CHEMO IV INFUSION 1 HR: CPT

## 2021-05-27 PROCEDURE — 10004651 HB RX, NO CHARGE ITEM: Performed by: INTERNAL MEDICINE

## 2021-05-27 PROCEDURE — 10002807 HB RX 258: Performed by: INTERNAL MEDICINE

## 2021-05-27 RX ORDER — 0.9 % SODIUM CHLORIDE 0.9 %
20 VIAL (ML) INJECTION ONCE
Status: CANCELLED | OUTPATIENT
Start: 2021-05-27 | End: 2021-05-27

## 2021-05-27 RX ORDER — DIPHENHYDRAMINE HYDROCHLORIDE 50 MG/ML
12.5 INJECTION INTRAMUSCULAR; INTRAVENOUS ONCE
Status: COMPLETED | OUTPATIENT
Start: 2021-05-27 | End: 2021-05-27

## 2021-05-27 RX ORDER — 0.9 % SODIUM CHLORIDE 0.9 %
20 VIAL (ML) INJECTION ONCE
Status: COMPLETED | OUTPATIENT
Start: 2021-05-27 | End: 2021-05-27

## 2021-05-27 RX ORDER — DIPHENHYDRAMINE HYDROCHLORIDE 50 MG/ML
12.5 INJECTION INTRAMUSCULAR; INTRAVENOUS ONCE
Status: CANCELLED
Start: 2021-07-22 | End: 2021-05-27

## 2021-05-27 RX ADMIN — SODIUM CHLORIDE, PRESERVATIVE FREE 20 ML: 5 INJECTION INTRAVENOUS at 12:51

## 2021-05-27 RX ADMIN — INFLIXIMAB 700 MG: 100 INJECTION, POWDER, LYOPHILIZED, FOR SOLUTION INTRAVENOUS at 10:46

## 2021-05-27 RX ADMIN — DIPHENHYDRAMINE HYDROCHLORIDE 12.5 MG: 50 INJECTION INTRAMUSCULAR; INTRAVENOUS at 10:23

## 2021-06-03 ENCOUNTER — HOSPITAL ENCOUNTER (OUTPATIENT)
Dept: CT IMAGING | Facility: HOSPITAL | Age: 71
Discharge: HOME | End: 2021-06-03
Attending: FAMILY MEDICINE

## 2021-06-03 DIAGNOSIS — Z13.6 SCREENING FOR CARDIOVASCULAR CONDITION: ICD-10-CM

## 2021-06-04 ENCOUNTER — TELEPHONE (OUTPATIENT)
Dept: CARDIOLOGY | Age: 71
End: 2021-06-04

## 2021-06-04 DIAGNOSIS — R93.1 ABNORMAL SCREENING CARDIAC CT: Primary | ICD-10-CM

## 2021-06-04 DIAGNOSIS — E08.40 DIABETES MELLITUS DUE TO UNDERLYING CONDITION WITH DIABETIC NEUROPATHY, WITHOUT LONG-TERM CURRENT USE OF INSULIN (CMD): ICD-10-CM

## 2021-06-04 DIAGNOSIS — R06.09 DYSPNEA ON EXERTION: ICD-10-CM

## 2021-06-09 DIAGNOSIS — E78.5 DYSLIPIDEMIA: ICD-10-CM

## 2021-06-09 RX ORDER — ATORVASTATIN CALCIUM 40 MG/1
40 TABLET, FILM COATED ORAL DAILY
Qty: 90 TABLET | Refills: 0 | Status: SHIPPED | OUTPATIENT
Start: 2021-06-09 | End: 2021-09-07

## 2021-06-10 ENCOUNTER — ORDER TRANSCRIPTION (OUTPATIENT)
Dept: ADMINISTRATIVE | Facility: HOSPITAL | Age: 71
End: 2021-06-10

## 2021-06-10 DIAGNOSIS — R93.1 ABNORMAL SCREENING CARDIAC CT: Primary | ICD-10-CM

## 2021-06-10 DIAGNOSIS — E08.40 DIABETES MELLITUS DUE TO UNDERLYING CONDITION WITH DIABETIC NEUROPATHY, WITHOUT LONG-TERM CURRENT USE OF INSULIN (HCC): ICD-10-CM

## 2021-06-10 DIAGNOSIS — R06.00 DYSPNEA ON EXERTION: ICD-10-CM

## 2021-06-15 DIAGNOSIS — M06.9 RHEUMATOID ARTHRITIS (CMD): ICD-10-CM

## 2021-06-16 RX ORDER — METHOTREXATE 2.5 MG/1
TABLET ORAL
Qty: 72 TABLET | Refills: 0 | Status: SHIPPED | OUTPATIENT
Start: 2021-06-16 | End: 2022-03-08 | Stop reason: SDUPTHER

## 2021-06-21 ENCOUNTER — LAB ENCOUNTER (OUTPATIENT)
Dept: LAB | Facility: HOSPITAL | Age: 71
End: 2021-06-21
Attending: INTERNAL MEDICINE
Payer: COMMERCIAL

## 2021-06-21 DIAGNOSIS — E11.49 TYPE II DIABETES MELLITUS WITH NEUROLOGICAL MANIFESTATIONS (HCC): Primary | ICD-10-CM

## 2021-06-21 PROCEDURE — 36415 COLL VENOUS BLD VENIPUNCTURE: CPT

## 2021-06-21 PROCEDURE — 82043 UR ALBUMIN QUANTITATIVE: CPT

## 2021-06-21 PROCEDURE — 3052F HG A1C>EQUAL 8.0%<EQUAL 9.0%: CPT | Performed by: INTERNAL MEDICINE

## 2021-06-21 PROCEDURE — 82570 ASSAY OF URINE CREATININE: CPT

## 2021-06-21 PROCEDURE — 3061F NEG MICROALBUMINURIA REV: CPT | Performed by: INTERNAL MEDICINE

## 2021-06-21 PROCEDURE — 83036 HEMOGLOBIN GLYCOSYLATED A1C: CPT

## 2021-06-21 PROCEDURE — 3060F POS MICROALBUMINURIA REV: CPT | Performed by: INTERNAL MEDICINE

## 2021-06-22 ENCOUNTER — CLINICAL ABSTRACT (OUTPATIENT)
Dept: CARDIOLOGY | Age: 71
End: 2021-06-22

## 2021-06-22 ENCOUNTER — HOSPITAL ENCOUNTER (OUTPATIENT)
Dept: CT IMAGING | Facility: HOSPITAL | Age: 71
Discharge: HOME | End: 2021-06-22
Attending: INTERNAL MEDICINE
Payer: COMMERCIAL

## 2021-06-22 VITALS
SYSTOLIC BLOOD PRESSURE: 108 MMHG | WEIGHT: 293 LBS | BODY MASS INDEX: 44.41 KG/M2 | HEART RATE: 70 BPM | DIASTOLIC BLOOD PRESSURE: 76 MMHG | HEIGHT: 68 IN | OXYGEN SATURATION: 97 %

## 2021-06-22 DIAGNOSIS — R93.1 ABNORMAL SCREENING CARDIAC CT: ICD-10-CM

## 2021-06-22 DIAGNOSIS — E08.40 DIABETES MELLITUS DUE TO UNDERLYING CONDITION WITH DIABETIC NEUROPATHY, WITHOUT LONG-TERM CURRENT USE OF INSULIN (HCC): ICD-10-CM

## 2021-06-22 DIAGNOSIS — R06.00 DYSPNEA ON EXERTION: ICD-10-CM

## 2021-06-22 PROCEDURE — 82565 ASSAY OF CREATININE: CPT

## 2021-06-22 RX ORDER — METOPROLOL TARTRATE 5 MG/5ML
INJECTION INTRAVENOUS
Status: DISPENSED
Start: 2021-06-22 | End: 2021-06-22

## 2021-06-22 RX ORDER — METOPROLOL TARTRATE 5 MG/5ML
5 INJECTION INTRAVENOUS SEE ADMIN INSTRUCTIONS
Status: DISCONTINUED | OUTPATIENT
Start: 2021-06-22 | End: 2021-06-24

## 2021-06-22 RX ORDER — NITROGLYCERIN 0.4 MG/1
0.4 TABLET SUBLINGUAL ONCE
Status: DISCONTINUED | OUTPATIENT
Start: 2021-06-22 | End: 2021-06-24

## 2021-06-22 RX ORDER — DILTIAZEM HYDROCHLORIDE 5 MG/ML
INJECTION INTRAVENOUS
Status: DISPENSED
Start: 2021-06-22 | End: 2021-06-22

## 2021-06-22 RX ORDER — DILTIAZEM HYDROCHLORIDE 5 MG/ML
5 INJECTION INTRAVENOUS SEE ADMIN INSTRUCTIONS
Status: DISCONTINUED | OUTPATIENT
Start: 2021-06-22 | End: 2021-06-24

## 2021-06-22 RX ADMIN — METOPROLOL TARTRATE 5 MG: 5 INJECTION INTRAVENOUS at 11:45:00

## 2021-06-22 RX ADMIN — DILTIAZEM HYDROCHLORIDE 5 MG: 5 INJECTION INTRAVENOUS at 12:00:00

## 2021-06-22 RX ADMIN — METOPROLOL TARTRATE 5 MG: 5 INJECTION INTRAVENOUS at 11:40:00

## 2021-06-22 RX ADMIN — DILTIAZEM HYDROCHLORIDE 5 MG: 5 INJECTION INTRAVENOUS at 11:55:00

## 2021-06-22 RX ADMIN — Medication 100 MG: at 09:41:00

## 2021-06-22 RX ADMIN — METOPROLOL TARTRATE 5 MG: 5 INJECTION INTRAVENOUS at 11:30:00

## 2021-06-22 RX ADMIN — METOPROLOL TARTRATE 5 MG: 5 INJECTION INTRAVENOUS at 11:35:00

## 2021-06-22 RX ADMIN — DILTIAZEM HYDROCHLORIDE 5 MG: 5 INJECTION INTRAVENOUS at 12:12:00

## 2021-06-22 RX ADMIN — DILTIAZEM HYDROCHLORIDE 5 MG: 5 INJECTION INTRAVENOUS at 12:05:00

## 2021-06-22 NOTE — IMAGING NOTE
7252: Addison Aviles arrived in radiology holding area. Identified with name and date of birth. Medications and allergies reviewed.       History: Dyspnea on exertion   TOTAL CALCIUM SCORE: 383  Diabetes mellitus  Hypertension  hyperlipidemia    Procedure e

## 2021-06-29 ENCOUNTER — TELEPHONE (OUTPATIENT)
Dept: FAMILY MEDICINE | Age: 71
End: 2021-06-29

## 2021-06-29 RX ORDER — METOPROLOL SUCCINATE 50 MG/1
50 TABLET, EXTENDED RELEASE ORAL DAILY
Qty: 90 TABLET | Refills: 0 | Status: SHIPPED | OUTPATIENT
Start: 2021-06-29 | End: 2021-09-30 | Stop reason: SDUPTHER

## 2021-06-29 RX ORDER — FUROSEMIDE 20 MG/1
20 TABLET ORAL EVERY MORNING
Qty: 90 TABLET | Refills: 0 | Status: SHIPPED | OUTPATIENT
Start: 2021-06-29 | End: 2021-10-19 | Stop reason: SDUPTHER

## 2021-06-29 RX ORDER — OMEPRAZOLE 20 MG/1
20 CAPSULE, DELAYED RELEASE ORAL DAILY
Qty: 90 CAPSULE | Refills: 0 | Status: SHIPPED | OUTPATIENT
Start: 2021-06-29 | End: 2021-09-23

## 2021-07-12 ENCOUNTER — HOSPITAL ENCOUNTER (OUTPATIENT)
Dept: CT IMAGING | Facility: HOSPITAL | Age: 71
Discharge: HOME | End: 2021-07-12
Attending: INTERNAL MEDICINE
Payer: COMMERCIAL

## 2021-07-12 ENCOUNTER — APPOINTMENT (OUTPATIENT)
Dept: CT IMAGING | Facility: HOSPITAL | Age: 71
End: 2021-07-12
Attending: INTERNAL MEDICINE
Payer: COMMERCIAL

## 2021-07-19 ENCOUNTER — TELEPHONE (OUTPATIENT)
Dept: FAMILY MEDICINE | Age: 71
End: 2021-07-19

## 2021-07-22 ENCOUNTER — OFFICE VISIT (OUTPATIENT)
Dept: INFUSION THERAPY | Age: 71
End: 2021-07-22
Attending: INTERNAL MEDICINE

## 2021-07-22 VITALS
SYSTOLIC BLOOD PRESSURE: 170 MMHG | DIASTOLIC BLOOD PRESSURE: 94 MMHG | OXYGEN SATURATION: 98 % | RESPIRATION RATE: 15 BRPM | HEART RATE: 72 BPM

## 2021-07-22 DIAGNOSIS — M05.711 RHEUMATOID ARTHRITIS INVOLVING RIGHT SHOULDER WITH POSITIVE RHEUMATOID FACTOR (CMD): Primary | ICD-10-CM

## 2021-07-22 PROCEDURE — 96415 CHEMO IV INFUSION ADDL HR: CPT

## 2021-07-22 PROCEDURE — 10002800 HB RX 250 W HCPCS: Performed by: INTERNAL MEDICINE

## 2021-07-22 PROCEDURE — 96413 CHEMO IV INFUSION 1 HR: CPT

## 2021-07-22 PROCEDURE — 10002807 HB RX 258: Performed by: INTERNAL MEDICINE

## 2021-07-22 PROCEDURE — 10004651 HB RX, NO CHARGE ITEM: Performed by: INTERNAL MEDICINE

## 2021-07-22 RX ORDER — 0.9 % SODIUM CHLORIDE 0.9 %
20 VIAL (ML) INJECTION ONCE
Status: COMPLETED | OUTPATIENT
Start: 2021-07-22 | End: 2021-07-22

## 2021-07-22 RX ORDER — DIPHENHYDRAMINE HYDROCHLORIDE 50 MG/ML
12.5 INJECTION INTRAMUSCULAR; INTRAVENOUS ONCE
Status: DISCONTINUED | OUTPATIENT
Start: 2021-07-22 | End: 2021-07-22 | Stop reason: HOSPADM

## 2021-07-22 RX ORDER — DIPHENHYDRAMINE HYDROCHLORIDE 50 MG/ML
12.5 INJECTION INTRAMUSCULAR; INTRAVENOUS ONCE
Status: CANCELLED
Start: 2021-09-15 | End: 2021-07-22

## 2021-07-22 RX ORDER — 0.9 % SODIUM CHLORIDE 0.9 %
20 VIAL (ML) INJECTION ONCE
Status: CANCELLED | OUTPATIENT
Start: 2021-07-22 | End: 2021-07-22

## 2021-07-22 RX ADMIN — INFLIXIMAB 700 MG: 100 INJECTION, POWDER, LYOPHILIZED, FOR SOLUTION INTRAVENOUS at 11:14

## 2021-07-22 RX ADMIN — SODIUM CHLORIDE 20 ML: 9 INJECTION INTRAMUSCULAR; INTRAVENOUS; SUBCUTANEOUS at 03:22

## 2021-07-22 ASSESSMENT — PAIN SCALES - GENERAL: PAINLEVEL: 0

## 2021-08-06 ENCOUNTER — EXTERNAL RECORD (OUTPATIENT)
Dept: HEALTH INFORMATION MANAGEMENT | Facility: OTHER | Age: 71
End: 2021-08-06

## 2021-08-31 ENCOUNTER — EXTERNAL RECORD (OUTPATIENT)
Dept: HEALTH INFORMATION MANAGEMENT | Facility: OTHER | Age: 71
End: 2021-08-31

## 2021-09-07 DIAGNOSIS — E78.5 DYSLIPIDEMIA: ICD-10-CM

## 2021-09-07 RX ORDER — ATORVASTATIN CALCIUM 40 MG/1
40 TABLET, FILM COATED ORAL DAILY
Qty: 90 TABLET | Refills: 0 | Status: SHIPPED | OUTPATIENT
Start: 2021-09-07 | End: 2021-12-18 | Stop reason: SDUPTHER

## 2021-09-15 ENCOUNTER — OFFICE VISIT (OUTPATIENT)
Dept: INFUSION THERAPY | Age: 71
End: 2021-09-15
Attending: INTERNAL MEDICINE

## 2021-09-15 VITALS
BODY MASS INDEX: 51.83 KG/M2 | TEMPERATURE: 97.8 F | HEART RATE: 68 BPM | DIASTOLIC BLOOD PRESSURE: 91 MMHG | WEIGHT: 293 LBS | RESPIRATION RATE: 16 BRPM | SYSTOLIC BLOOD PRESSURE: 151 MMHG | OXYGEN SATURATION: 98 %

## 2021-09-15 DIAGNOSIS — M05.711 RHEUMATOID ARTHRITIS INVOLVING RIGHT SHOULDER WITH POSITIVE RHEUMATOID FACTOR (CMD): Primary | ICD-10-CM

## 2021-09-15 PROCEDURE — 96413 CHEMO IV INFUSION 1 HR: CPT

## 2021-09-15 PROCEDURE — 10002807 HB RX 258: Performed by: INTERNAL MEDICINE

## 2021-09-15 PROCEDURE — 10004651 HB RX, NO CHARGE ITEM: Performed by: INTERNAL MEDICINE

## 2021-09-15 PROCEDURE — 10002800 HB RX 250 W HCPCS: Performed by: INTERNAL MEDICINE

## 2021-09-15 PROCEDURE — 96415 CHEMO IV INFUSION ADDL HR: CPT

## 2021-09-15 RX ORDER — 0.9 % SODIUM CHLORIDE 0.9 %
20 VIAL (ML) INJECTION ONCE
Status: COMPLETED | OUTPATIENT
Start: 2021-09-15 | End: 2021-09-15

## 2021-09-15 RX ORDER — DIPHENHYDRAMINE HYDROCHLORIDE 50 MG/ML
12.5 INJECTION INTRAMUSCULAR; INTRAVENOUS ONCE
Status: CANCELLED
Start: 2021-09-15 | End: 2021-09-15

## 2021-09-15 RX ORDER — DIPHENHYDRAMINE HYDROCHLORIDE 50 MG/ML
12.5 INJECTION INTRAMUSCULAR; INTRAVENOUS ONCE
Status: DISCONTINUED | OUTPATIENT
Start: 2021-09-15 | End: 2021-09-15 | Stop reason: HOSPADM

## 2021-09-15 RX ORDER — 0.9 % SODIUM CHLORIDE 0.9 %
20 VIAL (ML) INJECTION ONCE
Status: CANCELLED | OUTPATIENT
Start: 2021-09-15 | End: 2021-09-15

## 2021-09-15 RX ADMIN — SODIUM CHLORIDE, PRESERVATIVE FREE 20 ML: 5 INJECTION INTRAVENOUS at 13:26

## 2021-09-15 RX ADMIN — INFLIXIMAB 700 MG: 100 INJECTION, POWDER, LYOPHILIZED, FOR SOLUTION INTRAVENOUS at 11:23

## 2021-09-15 ASSESSMENT — PAIN SCALES - GENERAL: PAINLEVEL: 0

## 2021-09-18 DIAGNOSIS — M06.9 RHEUMATOID ARTHRITIS (CMD): ICD-10-CM

## 2021-09-20 RX ORDER — METHOTREXATE 2.5 MG/1
TABLET ORAL
Qty: 72 TABLET | Refills: 0 | OUTPATIENT
Start: 2021-09-20

## 2021-09-21 ENCOUNTER — HOSPITAL ENCOUNTER (OUTPATIENT)
Dept: CT IMAGING | Facility: HOSPITAL | Age: 71
End: 2021-09-21
Attending: INTERNAL MEDICINE
Payer: MEDICARE

## 2021-09-21 ENCOUNTER — HOSPITAL ENCOUNTER (OUTPATIENT)
Dept: CT IMAGING | Facility: HOSPITAL | Age: 71
Discharge: HOME | End: 2021-09-21
Attending: INTERNAL MEDICINE
Payer: MEDICARE

## 2021-09-21 VITALS
HEART RATE: 65 BPM | HEIGHT: 68 IN | DIASTOLIC BLOOD PRESSURE: 71 MMHG | SYSTOLIC BLOOD PRESSURE: 122 MMHG | OXYGEN SATURATION: 95 % | BODY MASS INDEX: 44.41 KG/M2 | WEIGHT: 293 LBS

## 2021-09-21 DIAGNOSIS — R93.1 ABNORMAL SCREENING CARDIAC CT: ICD-10-CM

## 2021-09-21 DIAGNOSIS — E08.40 DIABETES MELLITUS DUE TO UNDERLYING CONDITION WITH DIABETIC NEUROPATHY, WITHOUT LONG-TERM CURRENT USE OF INSULIN (HCC): ICD-10-CM

## 2021-09-21 DIAGNOSIS — R06.00 DYSPNEA ON EXERTION: ICD-10-CM

## 2021-09-21 LAB — CREAT BLD-MCNC: 1.6 MG/DL

## 2021-09-21 PROCEDURE — 75574 CT ANGIO HRT W/3D IMAGE: CPT | Performed by: INTERNAL MEDICINE

## 2021-09-21 PROCEDURE — 82565 ASSAY OF CREATININE: CPT

## 2021-09-21 RX ORDER — METOPROLOL TARTRATE 5 MG/5ML
INJECTION INTRAVENOUS
Status: DISPENSED
Start: 2021-09-21 | End: 2021-09-21

## 2021-09-21 RX ORDER — METOPROLOL TARTRATE 5 MG/5ML
INJECTION INTRAVENOUS
Status: DISCONTINUED
Start: 2021-09-21 | End: 2021-09-22

## 2021-09-21 RX ORDER — METOPROLOL TARTRATE 5 MG/5ML
INJECTION INTRAVENOUS
Status: DISCONTINUED
Start: 2021-09-21 | End: 2021-09-21 | Stop reason: WASHOUT

## 2021-09-21 RX ORDER — NITROGLYCERIN 0.4 MG/1
0.4 TABLET SUBLINGUAL ONCE
Status: COMPLETED | OUTPATIENT
Start: 2021-09-21 | End: 2021-09-21

## 2021-09-21 RX ORDER — DILTIAZEM HYDROCHLORIDE 5 MG/ML
5 INJECTION INTRAVENOUS SEE ADMIN INSTRUCTIONS
Status: DISCONTINUED | OUTPATIENT
Start: 2021-09-21 | End: 2021-09-23

## 2021-09-21 RX ORDER — METOPROLOL TARTRATE 5 MG/5ML
5 INJECTION INTRAVENOUS SEE ADMIN INSTRUCTIONS
Status: DISCONTINUED | OUTPATIENT
Start: 2021-09-21 | End: 2021-09-23

## 2021-09-21 RX ORDER — NITROGLYCERIN 0.4 MG/1
TABLET SUBLINGUAL
Status: DISCONTINUED
Start: 2021-09-21 | End: 2021-09-22

## 2021-09-21 RX ORDER — DILTIAZEM HYDROCHLORIDE 5 MG/ML
INJECTION INTRAVENOUS
Status: DISPENSED
Start: 2021-09-21 | End: 2021-09-21

## 2021-09-21 RX ADMIN — DILTIAZEM HYDROCHLORIDE 5 MG: 5 INJECTION INTRAVENOUS at 11:55:00

## 2021-09-21 RX ADMIN — METOPROLOL TARTRATE 5 MG: 5 INJECTION INTRAVENOUS at 11:35:00

## 2021-09-21 RX ADMIN — DILTIAZEM HYDROCHLORIDE 5 MG: 5 INJECTION INTRAVENOUS at 11:50:00

## 2021-09-21 RX ADMIN — NITROGLYCERIN 0.4 MG: 0.4 TABLET SUBLINGUAL at 13:00:00

## 2021-09-21 RX ADMIN — Medication 100 MG: at 10:19:00

## 2021-09-21 RX ADMIN — NITROGLYCERIN 0.4 MG: 0.4 TABLET SUBLINGUAL at 13:02:00

## 2021-09-21 RX ADMIN — DILTIAZEM HYDROCHLORIDE 5 MG: 5 INJECTION INTRAVENOUS at 12:05:00

## 2021-09-21 RX ADMIN — DILTIAZEM HYDROCHLORIDE 5 MG: 5 INJECTION INTRAVENOUS at 11:45:00

## 2021-09-21 RX ADMIN — METOPROLOL TARTRATE 5 MG: 5 INJECTION INTRAVENOUS at 11:30:00

## 2021-09-21 RX ADMIN — METOPROLOL TARTRATE 5 MG: 5 INJECTION INTRAVENOUS at 11:26:00

## 2021-09-21 RX ADMIN — METOPROLOL TARTRATE 5 MG: 5 INJECTION INTRAVENOUS at 11:20:00

## 2021-09-21 RX ADMIN — DILTIAZEM HYDROCHLORIDE 5 MG: 5 INJECTION INTRAVENOUS at 12:00:00

## 2021-09-21 NOTE — IMAGING NOTE
1000: Addison Aviles arrived in the radiology holding area with wheelchair assist. Mrs. Coffey reported ambulatory at home; in wheelchair to prevent elevation in heart rate with exertion. Identified with name and date of birth.   Medications and allergies rev Radha Pro that Zetta Nance current heart rate 66. Dr. Serjio Pro instructed to proceed with CTA with the administration of one additional dose metoprolol 5.0mg IV prior to start of scan.     1220: Awaiting CT availability    1255: [facility]

## 2021-09-23 RX ORDER — OMEPRAZOLE 20 MG/1
20 CAPSULE, DELAYED RELEASE ORAL DAILY
Qty: 90 CAPSULE | Refills: 0 | Status: SHIPPED | OUTPATIENT
Start: 2021-09-23 | End: 2021-12-29 | Stop reason: SDUPTHER

## 2021-09-30 ENCOUNTER — TELEPHONE (OUTPATIENT)
Dept: FAMILY MEDICINE | Age: 71
End: 2021-09-30

## 2021-09-30 RX ORDER — TERAZOSIN 10 MG/1
10 CAPSULE ORAL DAILY
Qty: 90 CAPSULE | Refills: 1 | Status: SHIPPED | OUTPATIENT
Start: 2021-09-30 | End: 2021-12-18 | Stop reason: SDUPTHER

## 2021-09-30 RX ORDER — METOPROLOL SUCCINATE 50 MG/1
50 TABLET, EXTENDED RELEASE ORAL DAILY
Qty: 90 TABLET | Refills: 1 | Status: SHIPPED | OUTPATIENT
Start: 2021-09-30 | End: 2021-11-17 | Stop reason: DRUGHIGH

## 2021-10-06 ASSESSMENT — PATIENT HEALTH QUESTIONNAIRE - PHQ9
2. FEELING DOWN, DEPRESSED OR HOPELESS: NOT AT ALL
CLINICAL INTERPRETATION OF PHQ2 SCORE: NO FURTHER SCREENING NEEDED
CLINICAL INTERPRETATION OF PHQ2 SCORE: 0
1. LITTLE INTEREST OR PLEASURE IN DOING THINGS: NOT AT ALL

## 2021-10-13 ENCOUNTER — OFFICE VISIT (OUTPATIENT)
Dept: FAMILY MEDICINE | Age: 71
End: 2021-10-13

## 2021-10-13 ENCOUNTER — LAB SERVICES (OUTPATIENT)
Dept: INTERNAL MEDICINE | Age: 71
End: 2021-10-13

## 2021-10-13 VITALS
SYSTOLIC BLOOD PRESSURE: 110 MMHG | WEIGHT: 293 LBS | DIASTOLIC BLOOD PRESSURE: 72 MMHG | BODY MASS INDEX: 45.99 KG/M2 | OXYGEN SATURATION: 97 % | TEMPERATURE: 97 F | HEIGHT: 67 IN | RESPIRATION RATE: 18 BRPM | HEART RATE: 91 BPM

## 2021-10-13 DIAGNOSIS — R93.1 ELEVATED CORONARY ARTERY CALCIUM SCORE: ICD-10-CM

## 2021-10-13 DIAGNOSIS — I10 BENIGN ESSENTIAL HYPERTENSION: ICD-10-CM

## 2021-10-13 DIAGNOSIS — E78.5 HYPERLIPIDEMIA, UNSPECIFIED HYPERLIPIDEMIA TYPE: ICD-10-CM

## 2021-10-13 DIAGNOSIS — N18.30 DIABETES MELLITUS WITH STAGE 3 CHRONIC KIDNEY DISEASE (CMD): ICD-10-CM

## 2021-10-13 DIAGNOSIS — M05.711 RHEUMATOID ARTHRITIS INVOLVING RIGHT SHOULDER WITH POSITIVE RHEUMATOID FACTOR (CMD): ICD-10-CM

## 2021-10-13 DIAGNOSIS — Z23 NEED FOR IMMUNIZATION AGAINST INFLUENZA: ICD-10-CM

## 2021-10-13 DIAGNOSIS — E11.22 DIABETES MELLITUS WITH STAGE 3 CHRONIC KIDNEY DISEASE (CMD): ICD-10-CM

## 2021-10-13 DIAGNOSIS — Z00.00 ENCOUNTER FOR MEDICARE ANNUAL WELLNESS EXAM: Primary | ICD-10-CM

## 2021-10-13 DIAGNOSIS — Z12.31 ENCOUNTER FOR SCREENING MAMMOGRAM FOR MALIGNANT NEOPLASM OF BREAST: ICD-10-CM

## 2021-10-13 DIAGNOSIS — Z00.00 ENCOUNTER FOR MEDICARE ANNUAL WELLNESS EXAM: ICD-10-CM

## 2021-10-13 DIAGNOSIS — G47.33 OSA (OBSTRUCTIVE SLEEP APNEA): ICD-10-CM

## 2021-10-13 DIAGNOSIS — R77.9 ELEVATED BLOOD PROTEIN: ICD-10-CM

## 2021-10-13 LAB
ALBUMIN SERPL-MCNC: 3.5 G/DL (ref 3.6–5.1)
ALBUMIN/GLOB SERPL: 0.7 {RATIO} (ref 1–2.4)
ALP SERPL-CCNC: 84 UNITS/L (ref 45–117)
ALT SERPL-CCNC: 31 UNITS/L
ANION GAP SERPL CALC-SCNC: 12 MMOL/L (ref 10–20)
AST SERPL-CCNC: 27 UNITS/L
BASOPHILS # BLD: 0.1 K/MCL (ref 0–0.3)
BASOPHILS NFR BLD: 1 %
BILIRUB SERPL-MCNC: 0.6 MG/DL (ref 0.2–1)
BUN SERPL-MCNC: 23 MG/DL (ref 6–20)
BUN/CREAT SERPL: 15 (ref 7–25)
CALCIUM SERPL-MCNC: 9.1 MG/DL (ref 8.4–10.2)
CHLORIDE SERPL-SCNC: 104 MMOL/L (ref 98–107)
CHOLEST SERPL-MCNC: 124 MG/DL
CHOLEST/HDLC SERPL: 2.3 {RATIO}
CO2 SERPL-SCNC: 28 MMOL/L (ref 21–32)
CREAT SERPL-MCNC: 1.52 MG/DL (ref 0.51–0.95)
DEPRECATED RDW RBC: 49.8 FL (ref 39–50)
EOSINOPHIL # BLD: 0.2 K/MCL (ref 0–0.5)
EOSINOPHIL NFR BLD: 3 %
ERYTHROCYTE [DISTWIDTH] IN BLOOD: 14.5 % (ref 11–15)
FASTING DURATION TIME PATIENT: 10 HOURS (ref 0–999)
FASTING DURATION TIME PATIENT: NORMAL H
GFR SERPLBLD BASED ON 1.73 SQ M-ARVRAT: 40 ML/MIN
GLOBULIN SER-MCNC: 5.3 G/DL (ref 2–4)
GLUCOSE SERPL-MCNC: 176 MG/DL (ref 70–99)
HBA1C MFR BLD: 7.5 % (ref 4.5–5.6)
HCT VFR BLD CALC: 44.7 % (ref 36–46.5)
HDLC SERPL-MCNC: 55 MG/DL
HGB BLD-MCNC: 14.3 G/DL (ref 12–15.5)
IMM GRANULOCYTES # BLD AUTO: 0 K/MCL (ref 0–0.2)
IMM GRANULOCYTES # BLD: 0 %
LDLC SERPL CALC-MCNC: 51 MG/DL
LYMPHOCYTES # BLD: 2.5 K/MCL (ref 1–4)
LYMPHOCYTES NFR BLD: 36 %
MCH RBC QN AUTO: 30.5 PG (ref 26–34)
MCHC RBC AUTO-ENTMCNC: 32 G/DL (ref 32–36.5)
MCV RBC AUTO: 95.3 FL (ref 78–100)
MONOCYTES # BLD: 0.9 K/MCL (ref 0.3–0.9)
MONOCYTES NFR BLD: 12 %
NEUTROPHILS # BLD: 3.3 K/MCL (ref 1.8–7.7)
NEUTROPHILS NFR BLD: 48 %
NONHDLC SERPL-MCNC: 69 MG/DL
NRBC BLD MANUAL-RTO: 0 /100 WBC
PLATELET # BLD AUTO: 205 K/MCL (ref 140–450)
POTASSIUM SERPL-SCNC: 4.3 MMOL/L (ref 3.4–5.1)
PROT SERPL-MCNC: 8.8 G/DL (ref 6.4–8.2)
RBC # BLD: 4.69 MIL/MCL (ref 4–5.2)
SODIUM SERPL-SCNC: 140 MMOL/L (ref 135–145)
TRIGL SERPL-MCNC: 92 MG/DL
TSH SERPL-ACNC: 2.28 MCUNITS/ML (ref 0.35–5)
WBC # BLD: 6.9 K/MCL (ref 4.2–11)

## 2021-10-13 PROCEDURE — 80061 LIPID PANEL: CPT | Performed by: FAMILY MEDICINE

## 2021-10-13 PROCEDURE — 84155 ASSAY OF PROTEIN SERUM: CPT | Performed by: FAMILY MEDICINE

## 2021-10-13 PROCEDURE — 36415 COLL VENOUS BLD VENIPUNCTURE: CPT

## 2021-10-13 PROCEDURE — 84165 PROTEIN E-PHORESIS SERUM: CPT | Performed by: FAMILY MEDICINE

## 2021-10-13 PROCEDURE — 90662 IIV NO PRSV INCREASED AG IM: CPT

## 2021-10-13 PROCEDURE — G0439 PPPS, SUBSEQ VISIT: HCPCS | Performed by: FAMILY MEDICINE

## 2021-10-13 PROCEDURE — G0008 ADMIN INFLUENZA VIRUS VAC: HCPCS

## 2021-10-13 PROCEDURE — 82570 ASSAY OF URINE CREATININE: CPT | Performed by: FAMILY MEDICINE

## 2021-10-13 PROCEDURE — 80050 GENERAL HEALTH PANEL: CPT | Performed by: FAMILY MEDICINE

## 2021-10-13 PROCEDURE — 83036 HEMOGLOBIN GLYCOSYLATED A1C: CPT | Performed by: FAMILY MEDICINE

## 2021-10-13 PROCEDURE — 82043 UR ALBUMIN QUANTITATIVE: CPT | Performed by: FAMILY MEDICINE

## 2021-10-13 PROCEDURE — 86334 IMMUNOFIX E-PHORESIS SERUM: CPT | Performed by: FAMILY MEDICINE

## 2021-10-13 RX ORDER — EZETIMIBE 10 MG/1
10 TABLET ORAL DAILY
COMMUNITY
Start: 2021-09-22

## 2021-10-13 RX ORDER — VALSARTAN AND HYDROCHLOROTHIAZIDE 160; 12.5 MG/1; MG/1
TABLET, FILM COATED ORAL
COMMUNITY
End: 2021-10-13 | Stop reason: ALTCHOICE

## 2021-10-13 ASSESSMENT — PAIN SCALES - GENERAL: PAINLEVEL: 0

## 2021-10-13 ASSESSMENT — PAIN SCALES - PAIN ENJOYMENT GENERAL ACTIVITY SCALE (PEG)
PEG_TOTALSCORE: 0
INTERFERED_GENERAL_ACTIVITY: 0 (DOES NOT INTERFERE)
AVG_PAIN_PASTWEEK: 0 (NO PAIN)
INTERFERED_ENJOYMENT_LIFE: 0 (DOES NOT INTERFERE)

## 2021-10-13 ASSESSMENT — ACTIVITIES OF DAILY LIVING (ADL)
NEEDS ASSISTANCE WITH FOOD: INDEPENDENT
EATING: INDEPENDENT
USING TELEPHONE: INDEPENDENT
USING TRANSPORTATION: INDEPENDENT
TAKING MEDICATION: INDEPENDENT
PILL BOX USED: YES
RECENT_DECLINE_ADL: NO
ADL_BEFORE_ADMISSION: INDEPENDENT
ADL_SHORT_OF_BREATH: NO
MANAGING FINANCES: INDEPENDENT
GROCERY SHOPPING: INDEPENDENT
PREPARING MEALS: INDEPENDENT
ADL_SCORE: 12
DOING HOUSEWORK: INDEPENDENT
STIL DRIVING: YES

## 2021-10-13 ASSESSMENT — COGNITIVE AND FUNCTIONAL STATUS - GENERAL
DO YOU HAVE SERIOUS DIFFICULTY WALKING OR CLIMBING STAIRS: NO
BECAUSE OF A PHYSICAL, MENTAL, OR EMOTIONAL CONDITION, DO YOU HAVE DIFFICULTY DOING ERRANDS ALONE: NO
DO YOU HAVE DIFFICULTY DRESSING OR BATHING: NO
BECAUSE OF A PHYSICAL, MENTAL, OR EMOTIONAL CONDITION, DO YOU HAVE SERIOUS DIFFICULTY CONCENTRATING, REMEMBERING OR MAKING DECISIONS: NO

## 2021-10-14 ENCOUNTER — TELEPHONE (OUTPATIENT)
Dept: FAMILY MEDICINE | Age: 71
End: 2021-10-14

## 2021-10-14 LAB
CREAT UR-MCNC: 245 MG/DL
MICROALBUMIN UR-MCNC: 15.8 MG/DL
MICROALBUMIN/CREAT UR: 64.5 MG/G

## 2021-10-17 DIAGNOSIS — R77.9 ELEVATED BLOOD PROTEIN: Primary | ICD-10-CM

## 2021-10-19 LAB
ALBUMIN SERPL ELPH-MCNC: 4.1 G/DL (ref 3.5–4.9)
ALPHA 1: 0.3 G/DL (ref 0.2–0.4)
ALPHA2 GLOB SERPL ELPH-MCNC: 0.7 G/DL (ref 0.5–0.9)
B-GLOBULIN SERPL ELPH-MCNC: 1.4 G/DL (ref 0.7–1.2)
GAMMA GLOB SERPL ELPH-MCNC: 2.2 G/DL (ref 0.7–1.7)
GLOBULIN SER-MCNC: 4.6 G/DL (ref 2.1–4.2)
IGA SERPL-MCNC: 383 MG/DL (ref 82–453)
IGG SERPL-MCNC: 2670 MG/DL (ref 751–1560)
IGM SERPL-MCNC: 364 MG/DL (ref 46–304)
KAPPA LC FREE SER NEPH-MCNC: 11.32 MG/DL (ref 0.33–1.94)
KAPPA LC/LAMBDA SER: 1.75 {RATIO} (ref 0.26–1.65)
LAMBDA LC FREE SER NEPH-MCNC: 6.47 MG/DL (ref 0.57–2.63)
M PROTEIN 1 SERPL ELPH-MCNC: 0.4 G/DL
PATH INTERP BLD-IMP: ABNORMAL
PATH INTERP SPEC-IMP: ABNORMAL
PROT SERPL-MCNC: 8.7 G/DL (ref 6.4–8.2)

## 2021-10-19 RX ORDER — FUROSEMIDE 20 MG/1
20 TABLET ORAL EVERY MORNING
Qty: 90 TABLET | Refills: 0 | Status: SHIPPED | OUTPATIENT
Start: 2021-10-19 | End: 2022-02-24

## 2021-10-20 DIAGNOSIS — D47.2 MONOCLONAL GAMMOPATHY: ICD-10-CM

## 2021-10-20 DIAGNOSIS — R77.9 ELEVATED SERUM PROTEIN LEVEL: Primary | ICD-10-CM

## 2021-10-24 ENCOUNTER — PATIENT MESSAGE (OUTPATIENT)
Dept: NEPHROLOGY | Facility: CLINIC | Age: 71
End: 2021-10-24

## 2021-10-24 DIAGNOSIS — N18.31 STAGE 3A CHRONIC KIDNEY DISEASE (HCC): Primary | ICD-10-CM

## 2021-10-25 NOTE — TELEPHONE ENCOUNTER
From: Natalya Coffey  To: Deon Davidson MD  Sent: 10/24/2021 12:55 AM CDT  Subject: Referral re: Emiliano Rojas,  I have an appointment scheduled to see you 11/2/21.  Blood work was done by my PC Dr. Dayo Tabor during my wellness visi

## 2021-10-26 ENCOUNTER — TELEPHONE (OUTPATIENT)
Dept: NEPHROLOGY | Facility: CLINIC | Age: 71
End: 2021-10-26

## 2021-10-26 NOTE — TELEPHONE ENCOUNTER
Spoke with pt, states she had labs done on 10/13/21 at [facility]. Do not see this in Epic. Informed pt and states she will fax over results.

## 2021-10-26 NOTE — TELEPHONE ENCOUNTER
Dr. Dorothy Vega, pt's labs are on Care everywhere from [facility]. Most recent were completed 2/25/21. Pt requesting if any additional labs required. Advised pt to have urinalysis, renal function panel, and CBC completed.

## 2021-10-26 NOTE — TELEPHONE ENCOUNTER
I have not seen her for close to 2 years. I see no recent labs in chart other than labs ordered by Dr. Kentrell Evans. If there are other labs need to send.   For me she needs to do CBC, renal panel, U/A before visit

## 2021-10-26 NOTE — TELEPHONE ENCOUNTER
Pt asking if  can see her test results in [employer] from [facility]

## 2021-11-01 ENCOUNTER — TELEPHONE (OUTPATIENT)
Dept: NEPHROLOGY | Facility: CLINIC | Age: 71
End: 2021-11-01

## 2021-11-01 NOTE — TELEPHONE ENCOUNTER
Dr. Elvis Ramirez lab result message  10/28/21 \"Labs from 10/13/21 reviewed. Kidney function stable but see soon for follow up visit. \"    Spoke with patient. Discussed below message. Pt verbalizes understanding.

## 2021-11-02 ENCOUNTER — OFFICE VISIT (OUTPATIENT)
Dept: NEPHROLOGY | Facility: CLINIC | Age: 71
End: 2021-11-02
Payer: MEDICARE

## 2021-11-02 VITALS
DIASTOLIC BLOOD PRESSURE: 86 MMHG | WEIGHT: 293 LBS | SYSTOLIC BLOOD PRESSURE: 122 MMHG | HEART RATE: 85 BPM | HEIGHT: 68 IN | BODY MASS INDEX: 44.41 KG/M2

## 2021-11-02 DIAGNOSIS — N18.31 STAGE 3A CHRONIC KIDNEY DISEASE (HCC): Primary | ICD-10-CM

## 2021-11-02 PROCEDURE — 3079F DIAST BP 80-89 MM HG: CPT | Performed by: INTERNAL MEDICINE

## 2021-11-02 PROCEDURE — 3008F BODY MASS INDEX DOCD: CPT | Performed by: INTERNAL MEDICINE

## 2021-11-02 PROCEDURE — 99215 OFFICE O/P EST HI 40 MIN: CPT | Performed by: INTERNAL MEDICINE

## 2021-11-02 PROCEDURE — 3074F SYST BP LT 130 MM HG: CPT | Performed by: INTERNAL MEDICINE

## 2021-11-02 RX ORDER — ATORVASTATIN CALCIUM 40 MG/1
40 TABLET, FILM COATED ORAL DAILY
COMMUNITY
Start: 2021-04-05

## 2021-11-02 RX ORDER — EZETIMIBE 10 MG/1
10 TABLET ORAL DAILY
COMMUNITY
Start: 2021-09-22

## 2021-11-02 NOTE — PROGRESS NOTES
11/02/21        Patient: Tello Wallace   YOB: 1950   Date of Visit: 11/2/2021       Dear  Dr. Scarlett Cortes MD,      Thank you for referring Tello Wallace to my practice. Please find my assessment and plan below.       As you know she is a 71-y reassured the patient that overall her renal function was stable. I last had laboratory studies done on her back in February 2020 when her creatinine was 1.57 with an estimated GFR 39 cc/min. Reinforced the importance of better compliance.  Encouraged her t

## 2021-11-02 NOTE — PATIENT INSTRUCTIONS
Please do laboratory studies as ordered. Then see Dr. Damon Chicas or one of her partners for further work-up and evaluation of the abnormal protein in your blood.

## 2021-11-04 ENCOUNTER — CLINICAL ABSTRACT (OUTPATIENT)
Dept: RHEUMATOLOGY | Age: 71
End: 2021-11-04

## 2021-11-04 RX ORDER — ACETAMINOPHEN 325 MG/1
650 TABLET ORAL ONCE
Status: CANCELLED | OUTPATIENT
Start: 2021-11-10 | End: 2021-11-04

## 2021-11-04 RX ORDER — DIPHENHYDRAMINE HCL 25 MG
25 CAPSULE ORAL ONCE
Status: CANCELLED | OUTPATIENT
Start: 2021-11-10 | End: 2021-11-04

## 2021-11-04 RX ORDER — DIPHENHYDRAMINE HYDROCHLORIDE 50 MG/ML
12.5 INJECTION INTRAMUSCULAR; INTRAVENOUS ONCE
Status: CANCELLED
Start: 2021-11-10 | End: 2021-11-04

## 2021-11-04 RX ORDER — 0.9 % SODIUM CHLORIDE 0.9 %
2 VIAL (ML) INJECTION PRN
Status: CANCELLED | OUTPATIENT
Start: 2021-11-04

## 2021-11-05 ENCOUNTER — LAB ENCOUNTER (OUTPATIENT)
Dept: LAB | Facility: HOSPITAL | Age: 71
End: 2021-11-05
Attending: INTERNAL MEDICINE
Payer: MEDICARE

## 2021-11-05 DIAGNOSIS — N18.31 STAGE 3A CHRONIC KIDNEY DISEASE (HCC): ICD-10-CM

## 2021-11-05 DIAGNOSIS — E78.00 PURE HYPERCHOLESTEROLEMIA: ICD-10-CM

## 2021-11-05 LAB
ALT SERPL-CCNC: 26 U/L (ref 13–56)
AST SERPL-CCNC: 23 U/L (ref 15–37)
CHOLEST SERPL-MCNC: 113 MG/DL
HDLC SERPL-MCNC: 52 MG/DL (ref 40–59)
LDLC SERPL CALC-MCNC: 43 MG/DL
LENGTH OF FAST TIME PATIENT: YES H
TRIGL SERPL-MCNC: 96 MG/DL (ref 30–149)
VLDLC SERPL CALC-MCNC: 13 MG/DL (ref 0–30)

## 2021-11-05 PROCEDURE — 80061 LIPID PANEL: CPT

## 2021-11-05 PROCEDURE — 84166 PROTEIN E-PHORESIS/URINE/CSF: CPT

## 2021-11-05 PROCEDURE — 86335 IMMUNFIX E-PHORSIS/URINE/CSF: CPT

## 2021-11-05 PROCEDURE — 85025 COMPLETE CBC W/AUTO DIFF WBC: CPT

## 2021-11-05 PROCEDURE — 80069 RENAL FUNCTION PANEL: CPT

## 2021-11-05 PROCEDURE — 84450 TRANSFERASE (AST) (SGOT): CPT

## 2021-11-05 PROCEDURE — 36415 COLL VENOUS BLD VENIPUNCTURE: CPT

## 2021-11-05 PROCEDURE — 81001 URINALYSIS AUTO W/SCOPE: CPT

## 2021-11-05 PROCEDURE — 84460 ALANINE AMINO (ALT) (SGPT): CPT

## 2021-11-10 ENCOUNTER — OFFICE VISIT (OUTPATIENT)
Dept: INFUSION THERAPY | Age: 71
End: 2021-11-10
Attending: INTERNAL MEDICINE

## 2021-11-10 VITALS
TEMPERATURE: 96.2 F | OXYGEN SATURATION: 99 % | DIASTOLIC BLOOD PRESSURE: 90 MMHG | BODY MASS INDEX: 53.97 KG/M2 | RESPIRATION RATE: 15 BRPM | WEIGHT: 293 LBS | HEART RATE: 78 BPM | SYSTOLIC BLOOD PRESSURE: 155 MMHG

## 2021-11-10 DIAGNOSIS — M05.711 RHEUMATOID ARTHRITIS INVOLVING RIGHT SHOULDER WITH POSITIVE RHEUMATOID FACTOR (CMD): Primary | ICD-10-CM

## 2021-11-10 PROCEDURE — 10004651 HB RX, NO CHARGE ITEM: Performed by: INTERNAL MEDICINE

## 2021-11-10 PROCEDURE — 96415 CHEMO IV INFUSION ADDL HR: CPT

## 2021-11-10 PROCEDURE — 10002807 HB RX 258: Performed by: INTERNAL MEDICINE

## 2021-11-10 PROCEDURE — 96413 CHEMO IV INFUSION 1 HR: CPT

## 2021-11-10 PROCEDURE — 10002800 HB RX 250 W HCPCS: Performed by: INTERNAL MEDICINE

## 2021-11-10 RX ORDER — DIPHENHYDRAMINE HYDROCHLORIDE 50 MG/ML
12.5 INJECTION INTRAMUSCULAR; INTRAVENOUS ONCE
Status: COMPLETED | OUTPATIENT
Start: 2021-11-10 | End: 2021-11-10

## 2021-11-10 RX ORDER — 0.9 % SODIUM CHLORIDE 0.9 %
2 VIAL (ML) INJECTION PRN
Status: DISCONTINUED | OUTPATIENT
Start: 2021-11-10 | End: 2021-11-10 | Stop reason: HOSPADM

## 2021-11-10 RX ORDER — ACETAMINOPHEN 325 MG/1
650 TABLET ORAL ONCE
Status: DISCONTINUED | OUTPATIENT
Start: 2021-11-10 | End: 2021-11-10 | Stop reason: HOSPADM

## 2021-11-10 RX ORDER — DIPHENHYDRAMINE HCL 25 MG
25 CAPSULE ORAL ONCE
Status: DISCONTINUED | OUTPATIENT
Start: 2021-11-10 | End: 2021-11-10 | Stop reason: HOSPADM

## 2021-11-10 RX ORDER — DIPHENHYDRAMINE HCL 25 MG
25 CAPSULE ORAL ONCE
Status: CANCELLED | OUTPATIENT
Start: 2022-01-06 | End: 2021-11-10

## 2021-11-10 RX ORDER — 0.9 % SODIUM CHLORIDE 0.9 %
2 VIAL (ML) INJECTION PRN
Status: CANCELLED | OUTPATIENT
Start: 2021-11-10

## 2021-11-10 RX ORDER — DIPHENHYDRAMINE HYDROCHLORIDE 50 MG/ML
12.5 INJECTION INTRAMUSCULAR; INTRAVENOUS ONCE
Status: CANCELLED
Start: 2022-01-06 | End: 2021-11-10

## 2021-11-10 RX ORDER — ACETAMINOPHEN 325 MG/1
650 TABLET ORAL ONCE
Status: CANCELLED | OUTPATIENT
Start: 2022-01-06 | End: 2021-11-10

## 2021-11-10 RX ADMIN — INFLIXIMAB 700 MG: 100 INJECTION, POWDER, LYOPHILIZED, FOR SOLUTION INTRAVENOUS at 10:33

## 2021-11-10 RX ADMIN — DIPHENHYDRAMINE HYDROCHLORIDE 12.5 MG: 50 INJECTION INTRAMUSCULAR; INTRAVENOUS at 10:39

## 2021-11-10 RX ADMIN — SODIUM CHLORIDE, PRESERVATIVE FREE 2 ML: 5 INJECTION INTRAVENOUS at 12:39

## 2021-11-10 ASSESSMENT — PAIN SCALES - GENERAL: PAINLEVEL: 0

## 2021-11-12 ENCOUNTER — TELEPHONE (OUTPATIENT)
Dept: FAMILY MEDICINE | Age: 71
End: 2021-11-12

## 2021-11-12 ENCOUNTER — TELEPHONE (OUTPATIENT)
Dept: NEPHROLOGY | Facility: CLINIC | Age: 71
End: 2021-11-12

## 2021-11-12 DIAGNOSIS — N18.31 STAGE 3A CHRONIC KIDNEY DISEASE (HCC): Primary | ICD-10-CM

## 2021-11-12 RX ORDER — FLUCONAZOLE 150 MG/1
150 TABLET ORAL ONCE
Qty: 1 TABLET | Refills: 1 | Status: SHIPPED | OUTPATIENT
Start: 2021-11-12 | End: 2021-11-12

## 2021-11-12 RX ORDER — FOLIC ACID 1 MG/1
1000 TABLET ORAL DAILY
Qty: 90 TABLET | Refills: 2 | Status: SHIPPED | OUTPATIENT
Start: 2021-11-12 | End: 2022-08-17

## 2021-11-12 NOTE — TELEPHONE ENCOUNTER
Spoke with pt, discussed below message. Pt verbalizes understanding. Orders placed. Pt states she will make apt with Dr. Madison Madera.

## 2021-11-12 NOTE — TELEPHONE ENCOUNTER
Kidney function remains stable. If doing well CPM.  Repeat CBC and renal panel in 3 months. Make standing order. Remind her to see Dr. Priscilla Kan or her partner regarding the abnormal protein in her blood.

## 2021-11-17 ENCOUNTER — V-VISIT (OUTPATIENT)
Dept: FAMILY MEDICINE | Age: 71
End: 2021-11-17

## 2021-11-17 DIAGNOSIS — I10 ESSENTIAL HYPERTENSION, BENIGN: Primary | ICD-10-CM

## 2021-11-17 DIAGNOSIS — R51.9 NONINTRACTABLE HEADACHE, UNSPECIFIED CHRONICITY PATTERN, UNSPECIFIED HEADACHE TYPE: ICD-10-CM

## 2021-11-17 PROCEDURE — 99214 OFFICE O/P EST MOD 30 MIN: CPT | Performed by: FAMILY MEDICINE

## 2021-11-17 RX ORDER — ADHESIVE BANDAGE 3/4"
BANDAGE TOPICAL
Qty: 1 EACH | Refills: 0 | Status: SHIPPED | OUTPATIENT
Start: 2021-11-17

## 2021-11-17 RX ORDER — METOPROLOL SUCCINATE 50 MG/1
75 TABLET, EXTENDED RELEASE ORAL DAILY
Qty: 90 TABLET | Refills: 0 | Status: SHIPPED | COMMUNITY
Start: 2021-11-17 | End: 2022-11-02 | Stop reason: DRUGHIGH

## 2021-11-17 ASSESSMENT — PATIENT HEALTH QUESTIONNAIRE - PHQ9
SUM OF ALL RESPONSES TO PHQ9 QUESTIONS 1 AND 2: 0
2. FEELING DOWN, DEPRESSED OR HOPELESS: NOT AT ALL
CLINICAL INTERPRETATION OF PHQ2 SCORE: NO FURTHER SCREENING NEEDED
1. LITTLE INTEREST OR PLEASURE IN DOING THINGS: NOT AT ALL
SUM OF ALL RESPONSES TO PHQ9 QUESTIONS 1 AND 2: 0

## 2021-11-18 ENCOUNTER — TELEPHONE (OUTPATIENT)
Dept: FAMILY MEDICINE | Age: 71
End: 2021-11-18

## 2021-11-19 ENCOUNTER — OFFICE VISIT (OUTPATIENT)
Dept: HEMATOLOGY/ONCOLOGY | Facility: HOSPITAL | Age: 71
End: 2021-11-19
Attending: STUDENT IN AN ORGANIZED HEALTH CARE EDUCATION/TRAINING PROGRAM
Payer: MEDICARE

## 2021-11-19 VITALS
HEART RATE: 89 BPM | DIASTOLIC BLOOD PRESSURE: 61 MMHG | HEIGHT: 68 IN | WEIGHT: 293 LBS | RESPIRATION RATE: 18 BRPM | SYSTOLIC BLOOD PRESSURE: 137 MMHG | OXYGEN SATURATION: 97 % | BODY MASS INDEX: 44.41 KG/M2 | TEMPERATURE: 99 F

## 2021-11-19 DIAGNOSIS — R77.9 ELEVATED BLOOD PROTEIN: Primary | ICD-10-CM

## 2021-11-19 DIAGNOSIS — R77.9 ELEVATED BLOOD PROTEIN: ICD-10-CM

## 2021-11-19 DIAGNOSIS — D47.2 MONOCLONAL GAMMOPATHY PRESENT ON SERUM PROTEIN ELECTROPHORESIS: ICD-10-CM

## 2021-11-19 PROCEDURE — 85025 COMPLETE CBC W/AUTO DIFF WBC: CPT

## 2021-11-19 PROCEDURE — 36415 COLL VENOUS BLD VENIPUNCTURE: CPT

## 2021-11-19 PROCEDURE — 82232 ASSAY OF BETA-2 PROTEIN: CPT

## 2021-11-19 PROCEDURE — 83615 LACTATE (LD) (LDH) ENZYME: CPT

## 2021-11-19 PROCEDURE — 87340 HEPATITIS B SURFACE AG IA: CPT

## 2021-11-19 PROCEDURE — 86704 HEP B CORE ANTIBODY TOTAL: CPT

## 2021-11-19 PROCEDURE — 83883 ASSAY NEPHELOMETRY NOT SPEC: CPT

## 2021-11-19 PROCEDURE — 87389 HIV-1 AG W/HIV-1&-2 AB AG IA: CPT

## 2021-11-19 PROCEDURE — 86140 C-REACTIVE PROTEIN: CPT

## 2021-11-19 PROCEDURE — 86708 HEPATITIS A ANTIBODY: CPT

## 2021-11-19 PROCEDURE — 82784 ASSAY IGA/IGD/IGG/IGM EACH: CPT

## 2021-11-19 PROCEDURE — 86334 IMMUNOFIX E-PHORESIS SERUM: CPT

## 2021-11-19 PROCEDURE — 80500 HEPATITIS A B + C PROFILE: CPT

## 2021-11-19 PROCEDURE — 99204 OFFICE O/P NEW MOD 45 MIN: CPT | Performed by: STUDENT IN AN ORGANIZED HEALTH CARE EDUCATION/TRAINING PROGRAM

## 2021-11-19 PROCEDURE — 84165 PROTEIN E-PHORESIS SERUM: CPT

## 2021-11-19 PROCEDURE — 86803 HEPATITIS C AB TEST: CPT

## 2021-11-19 PROCEDURE — 80053 COMPREHEN METABOLIC PANEL: CPT

## 2021-11-19 PROCEDURE — 86706 HEP B SURFACE ANTIBODY: CPT

## 2021-11-19 NOTE — CONSULTS
[address] [facility]  Initial Hematology Clinic Consult Note    Patient Name: Kavita Graves   YOB: 1950   Medical Record Number: [id number]  Referring Physician(s): Rafael Ley    Reason for consultation: monoclonal gammopathy ago denies illicit drug use.     Review of Systems:  Hematology/Oncology ROS performed and negative except as above in HPI    History/Other:   Past Medical History:  Past Medical History:   Diagnosis Date   • Arthritis     Rhumatoid   • Bursitis 12/2020   • Subcutaneous Solution Pen-injector, Inject 300 Units as directed daily. INJECT 80 UNITS PER DAY , Disp: , Rfl:   Hylan 16 MG/2ML Intra-articular Solution Prefilled Syringe, Inject 8 mg into the articular space. , Disp: , Rfl:       Allergies:   No Known All edema  Neurological: grossly intact    Results:   Labs:  Lab Results   Component Value Date    WBC 7.1 11/05/2021    HGB 14.0 11/05/2021    HCT 44.5 11/05/2021    .0 11/05/2021    CREATSERUM 1.52 (H) 11/05/2021    BUN 16 11/05/2021     11/05/2 review of notes and tests, ordering of tests, independent interpretation of tests; low risk of morbidity from additional testing/treatment    Enrique Herrera, [facility]    This note was c

## 2021-12-01 ENCOUNTER — OFFICE VISIT (OUTPATIENT)
Dept: HEMATOLOGY/ONCOLOGY | Facility: HOSPITAL | Age: 71
End: 2021-12-01
Attending: STUDENT IN AN ORGANIZED HEALTH CARE EDUCATION/TRAINING PROGRAM
Payer: MEDICARE

## 2021-12-01 ENCOUNTER — EXTERNAL RECORD (OUTPATIENT)
Dept: HEALTH INFORMATION MANAGEMENT | Facility: OTHER | Age: 71
End: 2021-12-01

## 2021-12-01 VITALS
BODY MASS INDEX: 44.41 KG/M2 | HEIGHT: 68 IN | RESPIRATION RATE: 18 BRPM | DIASTOLIC BLOOD PRESSURE: 68 MMHG | OXYGEN SATURATION: 96 % | SYSTOLIC BLOOD PRESSURE: 160 MMHG | WEIGHT: 293 LBS | TEMPERATURE: 100 F | HEART RATE: 85 BPM

## 2021-12-01 DIAGNOSIS — D47.2 MONOCLONAL GAMMOPATHY PRESENT ON SERUM PROTEIN ELECTROPHORESIS: ICD-10-CM

## 2021-12-01 DIAGNOSIS — D47.2 MGUS (MONOCLONAL GAMMOPATHY OF UNKNOWN SIGNIFICANCE): Primary | ICD-10-CM

## 2021-12-01 PROCEDURE — 99214 OFFICE O/P EST MOD 30 MIN: CPT | Performed by: STUDENT IN AN ORGANIZED HEALTH CARE EDUCATION/TRAINING PROGRAM

## 2021-12-01 NOTE — PROGRESS NOTES
[facility]  Hematology Progress Note    Patient Name: Padilla Mandujano   YOB: 1950   Medical Record Number: [id number]    Chief Complaint: review labwork    Subjective:   Padilla Mandujano is a 73year old female with a histo morning., Disp: 90 capsule, Rfl: 3  sodium chloride 0.9 % SOLN with inFLIXimab 100 MG SOLR 5 mg/kg, Inject into the vein., Disp: , Rfl:   PRUDOXIN 5 % External Cream, , Disp: , Rfl:   methotrexate 2.5 MG Oral Tab, Take 4 tablets by mouth once a week., Disp 11/19/2021    ALB 3.3 (L) 11/19/2021    ALB 4.01 11/19/2021    ALKPHO 90 11/19/2021    BILT 0.6 11/19/2021    TP 9.1 (H) 11/19/2021    TP 8.7 (H) 11/19/2021    AST 21 11/19/2021    ALT 29 11/19/2021    CRP 0.47 (H) 11/19/2021    PHOS 3.1 11/05/2021 arise.    MDM: kurt Calloway, DO    [facility]    This note was created using a voice-recognition transcribing system.  Incorrect words or phrases may have been missed during proofr

## 2021-12-02 PROBLEM — D47.2 MGUS (MONOCLONAL GAMMOPATHY OF UNKNOWN SIGNIFICANCE): Status: ACTIVE | Noted: 2021-12-02

## 2021-12-06 ENCOUNTER — TELEPHONE (OUTPATIENT)
Dept: CARDIOLOGY | Age: 71
End: 2021-12-06

## 2021-12-08 ENCOUNTER — OFFICE VISIT (OUTPATIENT)
Dept: FAMILY MEDICINE | Age: 71
End: 2021-12-08

## 2021-12-08 VITALS
BODY MASS INDEX: 45.99 KG/M2 | SYSTOLIC BLOOD PRESSURE: 136 MMHG | WEIGHT: 293 LBS | RESPIRATION RATE: 18 BRPM | OXYGEN SATURATION: 99 % | DIASTOLIC BLOOD PRESSURE: 87 MMHG | TEMPERATURE: 97.2 F | HEIGHT: 67 IN | HEART RATE: 77 BPM

## 2021-12-08 DIAGNOSIS — F41.9 ANXIETY: ICD-10-CM

## 2021-12-08 DIAGNOSIS — Z01.30 BLOOD PRESSURE CHECK: Primary | ICD-10-CM

## 2021-12-08 DIAGNOSIS — F43.9 STRESS: ICD-10-CM

## 2021-12-08 DIAGNOSIS — G47.33 OSA ON CPAP: ICD-10-CM

## 2021-12-08 DIAGNOSIS — I10 BENIGN ESSENTIAL HTN: ICD-10-CM

## 2021-12-08 PROCEDURE — 3075F SYST BP GE 130 - 139MM HG: CPT | Performed by: FAMILY MEDICINE

## 2021-12-08 PROCEDURE — 3079F DIAST BP 80-89 MM HG: CPT | Performed by: FAMILY MEDICINE

## 2021-12-08 PROCEDURE — 99214 OFFICE O/P EST MOD 30 MIN: CPT | Performed by: FAMILY MEDICINE

## 2021-12-08 ASSESSMENT — PAIN SCALES - GENERAL: PAINLEVEL: 0

## 2021-12-08 ASSESSMENT — PATIENT HEALTH QUESTIONNAIRE - PHQ9
SUM OF ALL RESPONSES TO PHQ9 QUESTIONS 1 AND 2: 0
1. LITTLE INTEREST OR PLEASURE IN DOING THINGS: NOT AT ALL
2. FEELING DOWN, DEPRESSED OR HOPELESS: NOT AT ALL
SUM OF ALL RESPONSES TO PHQ9 QUESTIONS 1 AND 2: 0
CLINICAL INTERPRETATION OF PHQ2 SCORE: NO FURTHER SCREENING NEEDED

## 2021-12-18 ENCOUNTER — TELEPHONE (OUTPATIENT)
Dept: FAMILY MEDICINE | Age: 71
End: 2021-12-18

## 2021-12-18 DIAGNOSIS — E78.5 DYSLIPIDEMIA: ICD-10-CM

## 2021-12-18 RX ORDER — TERAZOSIN 10 MG/1
10 CAPSULE ORAL DAILY
Qty: 90 CAPSULE | Refills: 1 | Status: SHIPPED | OUTPATIENT
Start: 2021-12-18 | End: 2022-06-09

## 2021-12-18 RX ORDER — ATORVASTATIN CALCIUM 40 MG/1
40 TABLET, FILM COATED ORAL DAILY
Qty: 90 TABLET | Refills: 0 | Status: SHIPPED | OUTPATIENT
Start: 2021-12-18 | End: 2022-03-24

## 2021-12-28 ENCOUNTER — E-ADVICE (OUTPATIENT)
Dept: FAMILY MEDICINE | Age: 71
End: 2021-12-28

## 2021-12-29 RX ORDER — OMEPRAZOLE 20 MG/1
20 CAPSULE, DELAYED RELEASE ORAL DAILY
Qty: 90 CAPSULE | Refills: 0 | Status: SHIPPED | OUTPATIENT
Start: 2021-12-29 | End: 2022-04-24 | Stop reason: SDUPTHER

## 2022-01-01 ENCOUNTER — EXTERNAL RECORD (OUTPATIENT)
Dept: OTHER | Age: 72
End: 2022-01-01

## 2022-01-06 ENCOUNTER — OFFICE VISIT (OUTPATIENT)
Dept: INFUSION THERAPY | Age: 72
End: 2022-01-06
Attending: INTERNAL MEDICINE

## 2022-01-06 ENCOUNTER — APPOINTMENT (OUTPATIENT)
Dept: RHEUMATOLOGY | Age: 72
End: 2022-01-06

## 2022-01-06 VITALS
RESPIRATION RATE: 15 BRPM | DIASTOLIC BLOOD PRESSURE: 70 MMHG | SYSTOLIC BLOOD PRESSURE: 176 MMHG | OXYGEN SATURATION: 97 % | WEIGHT: 293 LBS | HEART RATE: 66 BPM | BODY MASS INDEX: 53.73 KG/M2 | TEMPERATURE: 95.9 F

## 2022-01-06 DIAGNOSIS — M05.711 RHEUMATOID ARTHRITIS INVOLVING RIGHT SHOULDER WITH POSITIVE RHEUMATOID FACTOR (CMD): Primary | ICD-10-CM

## 2022-01-06 PROCEDURE — 10002807 HB RX 258: Performed by: INTERNAL MEDICINE

## 2022-01-06 PROCEDURE — 10004651 HB RX, NO CHARGE ITEM: Performed by: INTERNAL MEDICINE

## 2022-01-06 PROCEDURE — 10002800 HB RX 250 W HCPCS: Performed by: INTERNAL MEDICINE

## 2022-01-06 PROCEDURE — 96413 CHEMO IV INFUSION 1 HR: CPT

## 2022-01-06 PROCEDURE — 96415 CHEMO IV INFUSION ADDL HR: CPT

## 2022-01-06 RX ORDER — DIPHENHYDRAMINE HYDROCHLORIDE 50 MG/ML
12.5 INJECTION INTRAMUSCULAR; INTRAVENOUS ONCE
Status: DISCONTINUED | OUTPATIENT
Start: 2022-01-06 | End: 2022-01-06 | Stop reason: HOSPADM

## 2022-01-06 RX ORDER — ACETAMINOPHEN 325 MG/1
650 TABLET ORAL ONCE
Status: CANCELLED | OUTPATIENT
Start: 2022-01-06 | End: 2022-01-06

## 2022-01-06 RX ORDER — 0.9 % SODIUM CHLORIDE 0.9 %
2 VIAL (ML) INJECTION PRN
Status: DISCONTINUED | OUTPATIENT
Start: 2022-01-06 | End: 2022-01-06 | Stop reason: HOSPADM

## 2022-01-06 RX ORDER — 0.9 % SODIUM CHLORIDE 0.9 %
2 VIAL (ML) INJECTION PRN
Status: CANCELLED | OUTPATIENT
Start: 2022-01-06

## 2022-01-06 RX ORDER — DIPHENHYDRAMINE HCL 25 MG
25 CAPSULE ORAL ONCE
Status: CANCELLED | OUTPATIENT
Start: 2022-01-06 | End: 2022-01-06

## 2022-01-06 RX ORDER — DIPHENHYDRAMINE HYDROCHLORIDE 50 MG/ML
12.5 INJECTION INTRAMUSCULAR; INTRAVENOUS ONCE
Status: CANCELLED
Start: 2022-01-06 | End: 2022-01-06

## 2022-01-06 RX ADMIN — INFLIXIMAB 700 MG: 100 INJECTION, POWDER, LYOPHILIZED, FOR SOLUTION INTRAVENOUS at 10:33

## 2022-01-06 RX ADMIN — SODIUM CHLORIDE, PRESERVATIVE FREE 2 ML: 5 INJECTION INTRAVENOUS at 12:43

## 2022-01-06 ASSESSMENT — PAIN SCALES - GENERAL: PAINLEVEL: 0

## 2022-01-31 ENCOUNTER — TELEPHONE (OUTPATIENT)
Dept: FAMILY MEDICINE | Age: 72
End: 2022-01-31

## 2022-02-07 ENCOUNTER — EXTERNAL RECORD (OUTPATIENT)
Dept: HEALTH INFORMATION MANAGEMENT | Facility: OTHER | Age: 72
End: 2022-02-07

## 2022-02-07 LAB
HM DILATED EYE EXAM: NORMAL
RETINOPATHY PRESENT (RTP): NO

## 2022-02-09 ENCOUNTER — APPOINTMENT (OUTPATIENT)
Dept: GENERAL RADIOLOGY | Facility: HOSPITAL | Age: 72
End: 2022-02-09
Attending: EMERGENCY MEDICINE
Payer: MEDICARE

## 2022-02-09 ENCOUNTER — HOSPITAL ENCOUNTER (EMERGENCY)
Facility: HOSPITAL | Age: 72
Discharge: HOME | End: 2022-02-10
Attending: EMERGENCY MEDICINE
Payer: MEDICARE

## 2022-02-09 DIAGNOSIS — S42.401A CLOSED FRACTURE DISLOCATION OF RIGHT ELBOW, INITIAL ENCOUNTER: Primary | ICD-10-CM

## 2022-02-09 PROCEDURE — 73080 X-RAY EXAM OF ELBOW: CPT | Performed by: EMERGENCY MEDICINE

## 2022-02-09 PROCEDURE — 24600 TX CLSD ELBOW DISLC W/O ANES: CPT

## 2022-02-09 PROCEDURE — 96375 TX/PRO/DX INJ NEW DRUG ADDON: CPT

## 2022-02-09 PROCEDURE — 73070 X-RAY EXAM OF ELBOW: CPT | Performed by: EMERGENCY MEDICINE

## 2022-02-09 PROCEDURE — 96374 THER/PROPH/DIAG INJ IV PUSH: CPT

## 2022-02-09 PROCEDURE — 99285 EMERGENCY DEPT VISIT HI MDM: CPT

## 2022-02-09 RX ORDER — TRAMADOL HYDROCHLORIDE 50 MG/1
50 TABLET ORAL EVERY 8 HOURS PRN
Qty: 10 TABLET | Refills: 0 | Status: SHIPPED | OUTPATIENT
Start: 2022-02-09

## 2022-02-09 RX ORDER — KETAMINE HYDROCHLORIDE 50 MG/ML
0.5 INJECTION, SOLUTION, CONCENTRATE INTRAMUSCULAR; INTRAVENOUS ONCE
Status: COMPLETED | OUTPATIENT
Start: 2022-02-09 | End: 2022-02-09

## 2022-02-10 VITALS
SYSTOLIC BLOOD PRESSURE: 168 MMHG | TEMPERATURE: 98 F | HEART RATE: 72 BPM | BODY MASS INDEX: 49 KG/M2 | OXYGEN SATURATION: 94 % | RESPIRATION RATE: 19 BRPM | DIASTOLIC BLOOD PRESSURE: 97 MMHG | WEIGHT: 293 LBS

## 2022-02-10 LAB — GLUCOSE BLDC GLUCOMTR-MCNC: 196 MG/DL (ref 70–99)

## 2022-02-10 PROCEDURE — 82962 GLUCOSE BLOOD TEST: CPT

## 2022-02-11 ENCOUNTER — DOCUMENTATION (OUTPATIENT)
Dept: INTERNAL MEDICINE | Age: 72
End: 2022-02-11

## 2022-02-15 ENCOUNTER — HOSPITAL ENCOUNTER (OUTPATIENT)
Dept: LAB | Age: 72
Discharge: HOME | End: 2022-02-15
Attending: ORTHOPAEDIC SURGERY

## 2022-02-15 DIAGNOSIS — E11.49 TYPE 2 DIABETES MELLITUS WITH NEUROLOGICAL MANIFESTATIONS (CMD): Primary | ICD-10-CM

## 2022-02-15 DIAGNOSIS — Z01.812 PRE-PROCEDURAL LABORATORY EXAMINATIONS: ICD-10-CM

## 2022-02-15 DIAGNOSIS — Z01.818 PRE-OP EVALUATION: Primary | ICD-10-CM

## 2022-02-15 DIAGNOSIS — Z01.818 PRE-OP EVALUATION: ICD-10-CM

## 2022-02-15 DIAGNOSIS — S53.114A ANTERIOR DISLOCATION OF RIGHT ELBOW, INITIAL ENCOUNTER: ICD-10-CM

## 2022-02-15 LAB
ANION GAP SERPL CALC-SCNC: 8 MMOL/L (ref 10–20)
APTT PPP: 30 SEC (ref 22–30)
BASOPHILS # BLD: 0 K/MCL (ref 0–0.3)
BASOPHILS NFR BLD: 1 %
BUN SERPL-MCNC: 18 MG/DL (ref 6–20)
BUN/CREAT SERPL: 13 (ref 7–25)
CALCIUM SERPL-MCNC: 9.1 MG/DL (ref 8.4–10.2)
CHLORIDE SERPL-SCNC: 103 MMOL/L (ref 98–107)
CO2 SERPL-SCNC: 30 MMOL/L (ref 21–32)
CREAT SERPL-MCNC: 1.35 MG/DL (ref 0.51–0.95)
DEPRECATED RDW RBC: 49.6 FL (ref 39–50)
EOSINOPHIL # BLD: 0.2 K/MCL (ref 0–0.5)
EOSINOPHIL NFR BLD: 4 %
ERYTHROCYTE [DISTWIDTH] IN BLOOD: 14.5 % (ref 11–15)
FASTING DURATION TIME PATIENT: ABNORMAL H
GFR SERPLBLD BASED ON 1.73 SQ M-ARVRAT: 46 ML/MIN
GLUCOSE SERPL-MCNC: 94 MG/DL (ref 70–99)
HBA1C MFR BLD: 8.6 % (ref 4.5–5.6)
HCT VFR BLD CALC: 42.4 % (ref 36–46.5)
HGB BLD-MCNC: 13.4 G/DL (ref 12–15.5)
IMM GRANULOCYTES # BLD AUTO: 0 K/MCL (ref 0–0.2)
IMM GRANULOCYTES # BLD: 0 %
INR PPP: 1
LYMPHOCYTES # BLD: 1.9 K/MCL (ref 1–4)
LYMPHOCYTES NFR BLD: 33 %
MCH RBC QN AUTO: 29.9 PG (ref 26–34)
MCHC RBC AUTO-ENTMCNC: 31.6 G/DL (ref 32–36.5)
MCV RBC AUTO: 94.6 FL (ref 78–100)
MONOCYTES # BLD: 0.6 K/MCL (ref 0.3–0.9)
MONOCYTES NFR BLD: 10 %
NEUTROPHILS # BLD: 3 K/MCL (ref 1.8–7.7)
NEUTROPHILS NFR BLD: 52 %
NRBC BLD MANUAL-RTO: 0 /100 WBC
PLATELET # BLD AUTO: 206 K/MCL (ref 140–450)
POTASSIUM SERPL-SCNC: 3.7 MMOL/L (ref 3.4–5.1)
PROTHROMBIN TIME: 10.9 SEC (ref 9.7–11.8)
RBC # BLD: 4.48 MIL/MCL (ref 4–5.2)
SODIUM SERPL-SCNC: 137 MMOL/L (ref 135–145)
WBC # BLD: 5.7 K/MCL (ref 4.2–11)

## 2022-02-15 PROCEDURE — U0003 INFECTIOUS AGENT DETECTION BY NUCLEIC ACID (DNA OR RNA); SEVERE ACUTE RESPIRATORY SYNDROME CORONAVIRUS 2 (SARS-COV-2) (CORONAVIRUS DISEASE [COVID-19]), AMPLIFIED PROBE TECHNIQUE, MAKING USE OF HIGH THROUGHPUT TECHNOLOGIES AS DESCRIBED BY CMS-2020-01-R: HCPCS | Performed by: ORTHOPAEDIC SURGERY

## 2022-02-15 PROCEDURE — 80048 BASIC METABOLIC PNL TOTAL CA: CPT

## 2022-02-15 PROCEDURE — 85025 COMPLETE CBC W/AUTO DIFF WBC: CPT

## 2022-02-15 PROCEDURE — 93005 ELECTROCARDIOGRAM TRACING: CPT | Performed by: ORTHOPAEDIC SURGERY

## 2022-02-15 PROCEDURE — 85730 THROMBOPLASTIN TIME PARTIAL: CPT

## 2022-02-15 PROCEDURE — 93010 ELECTROCARDIOGRAM REPORT: CPT | Performed by: INTERNAL MEDICINE

## 2022-02-15 PROCEDURE — 36415 COLL VENOUS BLD VENIPUNCTURE: CPT

## 2022-02-15 PROCEDURE — 85610 PROTHROMBIN TIME: CPT

## 2022-02-15 PROCEDURE — 83036 HEMOGLOBIN GLYCOSYLATED A1C: CPT

## 2022-02-15 RX ORDER — TRAMADOL HYDROCHLORIDE 50 MG/1
50 TABLET ORAL EVERY 6 HOURS PRN
COMMUNITY
Start: 2022-02-09 | End: 2022-11-02 | Stop reason: ALTCHOICE

## 2022-02-15 RX ORDER — UBIDECARENONE 75 MG
50 CAPSULE ORAL DAILY
COMMUNITY
End: 2023-09-25 | Stop reason: ALTCHOICE

## 2022-02-15 RX ORDER — ACETAMINOPHEN 500 MG
1000 TABLET ORAL EVERY 6 HOURS PRN
COMMUNITY
End: 2022-12-15

## 2022-02-15 ASSESSMENT — ACTIVITIES OF DAILY LIVING (ADL)
ADL_SCORE: 12
SENSORY_SUPPORT_DEVICES: EYEGLASSES
ADL_BEFORE_ADMISSION: INDEPENDENT

## 2022-02-16 LAB
ATRIAL RATE (BPM): 68
P AXIS (DEGREES): 39
PR-INTERVAL (MSEC): 192
QRS-INTERVAL (MSEC): 98
QT-INTERVAL (MSEC): 420
QTC: 447
R AXIS (DEGREES): -21
REPORT TEXT: NORMAL
SARS-COV-2 RNA RESP QL NAA+PROBE: NOT DETECTED
SERVICE CMNT-IMP: NORMAL
SERVICE CMNT-IMP: NORMAL
T AXIS (DEGREES): 22
VENTRICULAR RATE EKG/MIN (BPM): 68

## 2022-02-16 RX ORDER — 0.9 % SODIUM CHLORIDE 0.9 %
2 VIAL (ML) INJECTION EVERY 12 HOURS SCHEDULED
Status: CANCELLED | OUTPATIENT
Start: 2022-02-16

## 2022-02-17 ENCOUNTER — ANESTHESIA EVENT (OUTPATIENT)
Dept: SURGERY | Age: 72
End: 2022-02-17

## 2022-02-17 ENCOUNTER — ANESTHESIA (OUTPATIENT)
Dept: SURGERY | Age: 72
End: 2022-02-17

## 2022-02-17 ENCOUNTER — APPOINTMENT (OUTPATIENT)
Dept: GENERAL RADIOLOGY | Age: 72
End: 2022-02-17
Attending: ORTHOPAEDIC SURGERY

## 2022-02-17 ENCOUNTER — HOSPITAL ENCOUNTER (OUTPATIENT)
Age: 72
Discharge: HOME | End: 2022-02-17
Attending: ORTHOPAEDIC SURGERY | Admitting: ORTHOPAEDIC SURGERY

## 2022-02-17 VITALS
TEMPERATURE: 96.8 F | OXYGEN SATURATION: 95 % | RESPIRATION RATE: 16 BRPM | BODY MASS INDEX: 44.41 KG/M2 | DIASTOLIC BLOOD PRESSURE: 87 MMHG | SYSTOLIC BLOOD PRESSURE: 169 MMHG | WEIGHT: 293 LBS | HEART RATE: 74 BPM | HEIGHT: 68 IN

## 2022-02-17 DIAGNOSIS — Z01.812 PRE-PROCEDURAL LABORATORY EXAMINATIONS: Primary | ICD-10-CM

## 2022-02-17 LAB
GLUCOSE BLDC GLUCOMTR-MCNC: 131 MG/DL (ref 70–99)
GLUCOSE BLDC GLUCOMTR-MCNC: 153 MG/DL (ref 70–99)

## 2022-02-17 PROCEDURE — 13000118 HB ORTHO MAJOR COMPLEX CASE S/U + 1ST 15 MIN: Performed by: ORTHOPAEDIC SURGERY

## 2022-02-17 PROCEDURE — 13000119 HB ORTHO MAJOR COMPLEX CASE EA ADD MINUTE: Performed by: ORTHOPAEDIC SURGERY

## 2022-02-17 PROCEDURE — 73070 X-RAY EXAM OF ELBOW: CPT

## 2022-02-17 PROCEDURE — C1769 GUIDE WIRE: HCPCS | Performed by: ORTHOPAEDIC SURGERY

## 2022-02-17 PROCEDURE — 10002807 HB RX 258: Performed by: ANESTHESIOLOGY

## 2022-02-17 PROCEDURE — 13000003 HB ANESTHESIA  GENERAL EA ADD MINUTE: Performed by: ORTHOPAEDIC SURGERY

## 2022-02-17 PROCEDURE — 82962 GLUCOSE BLOOD TEST: CPT

## 2022-02-17 PROCEDURE — 76000 FLUOROSCOPY <1 HR PHYS/QHP: CPT

## 2022-02-17 PROCEDURE — 10002800 HB RX 250 W HCPCS: Performed by: ANESTHESIOLOGY

## 2022-02-17 PROCEDURE — 10002803 HB RX 637: Performed by: ORTHOPAEDIC SURGERY

## 2022-02-17 PROCEDURE — C1713 ANCHOR/SCREW BN/BN,TIS/BN: HCPCS | Performed by: ORTHOPAEDIC SURGERY

## 2022-02-17 PROCEDURE — 10002801 HB RX 250 W/O HCPCS: Performed by: ORTHOPAEDIC SURGERY

## 2022-02-17 PROCEDURE — 10006023 HB SUPPLY 272: Performed by: ORTHOPAEDIC SURGERY

## 2022-02-17 PROCEDURE — 13000002 HB ANESTHESIA  GENERAL  S/U + 1ST 15 MIN: Performed by: ORTHOPAEDIC SURGERY

## 2022-02-17 PROCEDURE — 10006027 HB SUPPLY 278: Performed by: ORTHOPAEDIC SURGERY

## 2022-02-17 PROCEDURE — C1776 JOINT DEVICE (IMPLANTABLE): HCPCS | Performed by: ORTHOPAEDIC SURGERY

## 2022-02-17 PROCEDURE — 10002801 HB RX 250 W/O HCPCS: Performed by: ANESTHESIOLOGY

## 2022-02-17 PROCEDURE — 10004451 HB PACU RECOVERY 1ST 30 MINUTES: Performed by: ORTHOPAEDIC SURGERY

## 2022-02-17 PROCEDURE — 13000001 HB PHASE II RECOVERY EA 30 MINUTES: Performed by: ORTHOPAEDIC SURGERY

## 2022-02-17 PROCEDURE — 10004452 HB PACU ADDL 30 MINUTES: Performed by: ORTHOPAEDIC SURGERY

## 2022-02-17 DEVICE — IMPLANTABLE DEVICE: Type: IMPLANTABLE DEVICE | Site: ELBOW | Status: FUNCTIONAL

## 2022-02-17 DEVICE — 22.0MM HEAD, RIGHT
Type: IMPLANTABLE DEVICE | Site: ELBOW | Status: FUNCTIONAL
Brand: ACUMED

## 2022-02-17 DEVICE — DX FIBERTAK SUTURE ANCHOR, ST & NDLS
Type: IMPLANTABLE DEVICE | Site: ELBOW | Status: FUNCTIONAL
Brand: ARTHREX®

## 2022-02-17 RX ORDER — MIDAZOLAM HYDROCHLORIDE 1 MG/ML
INJECTION, SOLUTION INTRAMUSCULAR; INTRAVENOUS PRN
Status: DISCONTINUED | OUTPATIENT
Start: 2022-02-17 | End: 2022-02-17

## 2022-02-17 RX ORDER — SODIUM CHLORIDE, SODIUM LACTATE, POTASSIUM CHLORIDE, CALCIUM CHLORIDE 600; 310; 30; 20 MG/100ML; MG/100ML; MG/100ML; MG/100ML
INJECTION, SOLUTION INTRAVENOUS CONTINUOUS
Status: DISCONTINUED | OUTPATIENT
Start: 2022-02-17 | End: 2022-02-17 | Stop reason: HOSPADM

## 2022-02-17 RX ORDER — EPHEDRINE SULFATE/0.9% NACL/PF 50 MG/10ML
SYRINGE (ML) INTRAVENOUS PRN
Status: DISCONTINUED | OUTPATIENT
Start: 2022-02-17 | End: 2022-02-17

## 2022-02-17 RX ORDER — 0.9 % SODIUM CHLORIDE 0.9 %
2 VIAL (ML) INJECTION EVERY 12 HOURS SCHEDULED
Status: DISCONTINUED | OUTPATIENT
Start: 2022-02-17 | End: 2022-02-17 | Stop reason: HOSPADM

## 2022-02-17 RX ORDER — NEOSTIGMINE METHYLSULFATE 1 MG/ML
INJECTION, SOLUTION INTRAVENOUS PRN
Status: DISCONTINUED | OUTPATIENT
Start: 2022-02-17 | End: 2022-02-17

## 2022-02-17 RX ORDER — PROPOFOL 10 MG/ML
INJECTION, EMULSION INTRAVENOUS PRN
Status: DISCONTINUED | OUTPATIENT
Start: 2022-02-17 | End: 2022-02-17

## 2022-02-17 RX ORDER — ONDANSETRON 2 MG/ML
INJECTION INTRAMUSCULAR; INTRAVENOUS PRN
Status: DISCONTINUED | OUTPATIENT
Start: 2022-02-17 | End: 2022-02-17

## 2022-02-17 RX ORDER — ONDANSETRON 2 MG/ML
4 INJECTION INTRAMUSCULAR; INTRAVENOUS 2 TIMES DAILY PRN
Status: DISCONTINUED | OUTPATIENT
Start: 2022-02-17 | End: 2022-02-17 | Stop reason: HOSPADM

## 2022-02-17 RX ORDER — LIDOCAINE HYDROCHLORIDE AND EPINEPHRINE 10; 10 MG/ML; UG/ML
INJECTION, SOLUTION INFILTRATION; PERINEURAL PRN
Status: DISCONTINUED | OUTPATIENT
Start: 2022-02-17 | End: 2022-02-17 | Stop reason: HOSPADM

## 2022-02-17 RX ORDER — METOCLOPRAMIDE HYDROCHLORIDE 5 MG/ML
5 INJECTION INTRAMUSCULAR; INTRAVENOUS EVERY 6 HOURS PRN
Status: DISCONTINUED | OUTPATIENT
Start: 2022-02-17 | End: 2022-02-17 | Stop reason: HOSPADM

## 2022-02-17 RX ORDER — DEXAMETHASONE SODIUM PHOSPHATE 4 MG/ML
INJECTION, SOLUTION INTRA-ARTICULAR; INTRALESIONAL; INTRAMUSCULAR; INTRAVENOUS; SOFT TISSUE PRN
Status: DISCONTINUED | OUTPATIENT
Start: 2022-02-17 | End: 2022-02-17

## 2022-02-17 RX ORDER — SODIUM CHLORIDE 9 MG/ML
INJECTION, SOLUTION INTRAVENOUS CONTINUOUS
Status: DISCONTINUED | OUTPATIENT
Start: 2022-02-17 | End: 2022-02-17 | Stop reason: HOSPADM

## 2022-02-17 RX ORDER — ROPIVACAINE HYDROCHLORIDE 5 MG/ML
30 INJECTION, SOLUTION EPIDURAL; INFILTRATION; PERINEURAL ONCE
Status: CANCELLED | OUTPATIENT
Start: 2022-02-17 | End: 2022-02-17

## 2022-02-17 RX ORDER — TRAMADOL HYDROCHLORIDE 50 MG/1
50 TABLET ORAL ONCE
Status: COMPLETED | OUTPATIENT
Start: 2022-02-17 | End: 2022-02-17

## 2022-02-17 RX ORDER — HYDRALAZINE HYDROCHLORIDE 20 MG/ML
5 INJECTION INTRAMUSCULAR; INTRAVENOUS EVERY 10 MIN PRN
Status: DISCONTINUED | OUTPATIENT
Start: 2022-02-17 | End: 2022-02-17 | Stop reason: HOSPADM

## 2022-02-17 RX ORDER — MIDAZOLAM HYDROCHLORIDE 1 MG/ML
2 INJECTION, SOLUTION INTRAMUSCULAR; INTRAVENOUS ONCE
Status: CANCELLED | OUTPATIENT
Start: 2022-02-17 | End: 2022-02-17

## 2022-02-17 RX ORDER — ROCURONIUM BROMIDE 10 MG/ML
INJECTION, SOLUTION INTRAVENOUS PRN
Status: DISCONTINUED | OUTPATIENT
Start: 2022-02-17 | End: 2022-02-17

## 2022-02-17 RX ORDER — SODIUM CHLORIDE, SODIUM LACTATE, POTASSIUM CHLORIDE, CALCIUM CHLORIDE 600; 310; 30; 20 MG/100ML; MG/100ML; MG/100ML; MG/100ML
INJECTION, SOLUTION INTRAVENOUS CONTINUOUS PRN
Status: DISCONTINUED | OUTPATIENT
Start: 2022-02-17 | End: 2022-02-17

## 2022-02-17 RX ORDER — GLYCOPYRROLATE 0.2 MG/ML
INJECTION, SOLUTION INTRAMUSCULAR; INTRAVENOUS PRN
Status: DISCONTINUED | OUTPATIENT
Start: 2022-02-17 | End: 2022-02-17

## 2022-02-17 RX ADMIN — ONDANSETRON 4 MG: 2 INJECTION INTRAMUSCULAR; INTRAVENOUS at 11:31

## 2022-02-17 RX ADMIN — TRAMADOL HYDROCHLORIDE 50 MG: 50 TABLET ORAL at 18:28

## 2022-02-17 RX ADMIN — ROCURONIUM BROMIDE 50 MG: 10 INJECTION INTRAVENOUS at 11:19

## 2022-02-17 RX ADMIN — CEFAZOLIN 3000 MG: 10 INJECTION, POWDER, FOR SOLUTION INTRAVENOUS at 14:46

## 2022-02-17 RX ADMIN — CEFAZOLIN 3000 MG: 10 INJECTION, POWDER, FOR SOLUTION INTRAVENOUS at 11:30

## 2022-02-17 RX ADMIN — HYDROMORPHONE HYDROCHLORIDE 1 MG: 1 INJECTION, SOLUTION INTRAMUSCULAR; INTRAVENOUS; SUBCUTANEOUS at 11:33

## 2022-02-17 RX ADMIN — EPHEDRINE SULFATE 10 MG: 5 INJECTION INTRAVENOUS at 14:32

## 2022-02-17 RX ADMIN — HYDROMORPHONE HYDROCHLORIDE 0.2 MG: 1 INJECTION, SOLUTION INTRAMUSCULAR; INTRAVENOUS; SUBCUTANEOUS at 17:08

## 2022-02-17 RX ADMIN — HYDROMORPHONE HYDROCHLORIDE 0.2 MG: 1 INJECTION, SOLUTION INTRAMUSCULAR; INTRAVENOUS; SUBCUTANEOUS at 16:38

## 2022-02-17 RX ADMIN — NEOSTIGMINE METHYLSULFATE 3 MG: 1 INJECTION INTRAVENOUS at 15:41

## 2022-02-17 RX ADMIN — PROPOFOL 150 MG: 10 INJECTION, EMULSION INTRAVENOUS at 11:19

## 2022-02-17 RX ADMIN — HYDROMORPHONE HYDROCHLORIDE 0.2 MG: 1 INJECTION, SOLUTION INTRAMUSCULAR; INTRAVENOUS; SUBCUTANEOUS at 17:18

## 2022-02-17 RX ADMIN — MIDAZOLAM HYDROCHLORIDE 2 MG: 1 INJECTION, SOLUTION INTRAMUSCULAR; INTRAVENOUS at 11:13

## 2022-02-17 RX ADMIN — GLYCOPYRROLATE 0.6 MG: 0.2 INJECTION, SOLUTION INTRAMUSCULAR; INTRAVENOUS at 15:41

## 2022-02-17 RX ADMIN — SODIUM CHLORIDE, POTASSIUM CHLORIDE, SODIUM LACTATE AND CALCIUM CHLORIDE: 600; 310; 30; 20 INJECTION, SOLUTION INTRAVENOUS at 11:13

## 2022-02-17 RX ADMIN — DEXAMETHASONE SODIUM PHOSPHATE 4 MG: 4 INJECTION, SOLUTION INTRAMUSCULAR; INTRAVENOUS at 11:31

## 2022-02-17 RX ADMIN — HYDROMORPHONE HYDROCHLORIDE 0.2 MG: 1 INJECTION, SOLUTION INTRAMUSCULAR; INTRAVENOUS; SUBCUTANEOUS at 16:58

## 2022-02-17 RX ADMIN — FENTANYL CITRATE 100 MCG: 50 INJECTION, SOLUTION INTRAMUSCULAR; INTRAVENOUS at 16:05

## 2022-02-17 RX ADMIN — HYDROMORPHONE HYDROCHLORIDE 0.2 MG: 1 INJECTION, SOLUTION INTRAMUSCULAR; INTRAVENOUS; SUBCUTANEOUS at 16:48

## 2022-02-17 RX ADMIN — SODIUM CHLORIDE, POTASSIUM CHLORIDE, SODIUM LACTATE AND CALCIUM CHLORIDE: 600; 310; 30; 20 INJECTION, SOLUTION INTRAVENOUS at 10:53

## 2022-02-17 RX ADMIN — FENTANYL CITRATE 100 MCG: 50 INJECTION, SOLUTION INTRAMUSCULAR; INTRAVENOUS at 11:13

## 2022-02-17 ASSESSMENT — PAIN SCALES - GENERAL
PAINLEVEL_OUTOF10: 3
PAINLEVEL_OUTOF10: 2
PAINLEVEL_OUTOF10: 4
PAINLEVEL_OUTOF10: 4
PAINLEVEL_OUTOF10: 5
PAINLEVEL_OUTOF10: 5
PAINLEVEL_OUTOF10: 4
PAINLEVEL_OUTOF10: 3
PAINLEVEL_OUTOF10: 5
PAINLEVEL_OUTOF10: 5
PAINLEVEL_OUTOF10: 6
PAINLEVEL_OUTOF10: 3

## 2022-02-17 ASSESSMENT — PAIN DESCRIPTION - PAIN TYPE
TYPE: ACUTE PAIN

## 2022-02-24 RX ORDER — FUROSEMIDE 20 MG/1
TABLET ORAL
Qty: 90 TABLET | Refills: 0 | Status: SHIPPED | OUTPATIENT
Start: 2022-02-24 | End: 2022-06-01

## 2022-02-28 ENCOUNTER — TELEPHONE (OUTPATIENT)
Dept: RHEUMATOLOGY | Age: 72
End: 2022-02-28

## 2022-03-03 ENCOUNTER — OFFICE VISIT (OUTPATIENT)
Dept: INFUSION THERAPY | Age: 72
End: 2022-03-03
Attending: INTERNAL MEDICINE

## 2022-03-03 ENCOUNTER — APPOINTMENT (OUTPATIENT)
Dept: RHEUMATOLOGY | Age: 72
End: 2022-03-03

## 2022-03-03 VITALS
TEMPERATURE: 97.8 F | HEART RATE: 84 BPM | BODY MASS INDEX: 51.66 KG/M2 | DIASTOLIC BLOOD PRESSURE: 80 MMHG | OXYGEN SATURATION: 97 % | RESPIRATION RATE: 18 BRPM | SYSTOLIC BLOOD PRESSURE: 138 MMHG | WEIGHT: 293 LBS

## 2022-03-03 DIAGNOSIS — M05.711 RHEUMATOID ARTHRITIS INVOLVING RIGHT SHOULDER WITH POSITIVE RHEUMATOID FACTOR (CMD): Primary | ICD-10-CM

## 2022-03-03 PROCEDURE — 10002807 HB RX 258: Performed by: INTERNAL MEDICINE

## 2022-03-03 PROCEDURE — 10004651 HB RX, NO CHARGE ITEM: Performed by: INTERNAL MEDICINE

## 2022-03-03 PROCEDURE — 96413 CHEMO IV INFUSION 1 HR: CPT

## 2022-03-03 PROCEDURE — 96415 CHEMO IV INFUSION ADDL HR: CPT

## 2022-03-03 PROCEDURE — 10002800 HB RX 250 W HCPCS: Performed by: INTERNAL MEDICINE

## 2022-03-03 RX ORDER — DIPHENHYDRAMINE HCL 25 MG
25 CAPSULE ORAL ONCE
Status: CANCELLED | OUTPATIENT
Start: 2022-03-03 | End: 2022-03-03

## 2022-03-03 RX ORDER — 0.9 % SODIUM CHLORIDE 0.9 %
2 VIAL (ML) INJECTION PRN
Status: CANCELLED | OUTPATIENT
Start: 2022-03-03

## 2022-03-03 RX ORDER — ACETAMINOPHEN 325 MG/1
650 TABLET ORAL ONCE
Status: CANCELLED | OUTPATIENT
Start: 2022-03-03 | End: 2022-03-03

## 2022-03-03 RX ORDER — 0.9 % SODIUM CHLORIDE 0.9 %
2 VIAL (ML) INJECTION PRN
Status: DISCONTINUED | OUTPATIENT
Start: 2022-03-03 | End: 2022-03-03 | Stop reason: HOSPADM

## 2022-03-03 RX ORDER — DIPHENHYDRAMINE HYDROCHLORIDE 50 MG/ML
12.5 INJECTION INTRAMUSCULAR; INTRAVENOUS ONCE
Status: DISCONTINUED | OUTPATIENT
Start: 2022-03-03 | End: 2022-03-03 | Stop reason: HOSPADM

## 2022-03-03 RX ORDER — DIPHENHYDRAMINE HYDROCHLORIDE 50 MG/ML
12.5 INJECTION INTRAMUSCULAR; INTRAVENOUS ONCE
Status: CANCELLED
Start: 2022-03-03 | End: 2022-03-03

## 2022-03-03 RX ADMIN — INFLIXIMAB 700 MG: 100 INJECTION, POWDER, LYOPHILIZED, FOR SOLUTION INTRAVENOUS at 12:59

## 2022-03-03 RX ADMIN — SODIUM CHLORIDE 2 ML: 9 INJECTION, SOLUTION INTRAMUSCULAR; INTRAVENOUS; SUBCUTANEOUS at 15:09

## 2022-03-03 ASSESSMENT — PAIN SCALES - GENERAL: PAINLEVEL: 0

## 2022-03-07 ENCOUNTER — E-ADVICE (OUTPATIENT)
Dept: RHEUMATOLOGY | Age: 72
End: 2022-03-07

## 2022-03-07 ENCOUNTER — TELEPHONE (OUTPATIENT)
Dept: FAMILY MEDICINE | Age: 72
End: 2022-03-07

## 2022-03-07 DIAGNOSIS — M06.9 RHEUMATOID ARTHRITIS (CMD): ICD-10-CM

## 2022-03-08 RX ORDER — METHOTREXATE 2.5 MG/1
10 TABLET ORAL
Qty: 48 TABLET | Refills: 2 | Status: SHIPPED | OUTPATIENT
Start: 2022-03-14 | End: 2022-11-07

## 2022-03-09 ENCOUNTER — APPOINTMENT (OUTPATIENT)
Dept: HEMATOLOGY/ONCOLOGY | Facility: HOSPITAL | Age: 72
End: 2022-03-09
Attending: STUDENT IN AN ORGANIZED HEALTH CARE EDUCATION/TRAINING PROGRAM
Payer: MEDICARE

## 2022-03-24 ENCOUNTER — TELEPHONE (OUTPATIENT)
Dept: SCHEDULING | Age: 72
End: 2022-03-24

## 2022-03-24 DIAGNOSIS — E78.5 DYSLIPIDEMIA: ICD-10-CM

## 2022-03-24 RX ORDER — ATORVASTATIN CALCIUM 40 MG/1
TABLET, FILM COATED ORAL
Qty: 90 TABLET | Refills: 0 | Status: SHIPPED | OUTPATIENT
Start: 2022-03-24 | End: 2022-06-28

## 2022-03-29 DIAGNOSIS — E78.5 DYSLIPIDEMIA: ICD-10-CM

## 2022-03-29 RX ORDER — VALSARTAN AND HYDROCHLOROTHIAZIDE 320; 12.5 MG/1; MG/1
TABLET, FILM COATED ORAL
Qty: 90 TABLET | Refills: 0 | Status: SHIPPED | OUTPATIENT
Start: 2022-03-29 | End: 2022-06-28

## 2022-04-19 ENCOUNTER — LAB ENCOUNTER (OUTPATIENT)
Dept: LAB | Facility: HOSPITAL | Age: 72
End: 2022-04-19
Attending: STUDENT IN AN ORGANIZED HEALTH CARE EDUCATION/TRAINING PROGRAM
Payer: MEDICARE

## 2022-04-19 DIAGNOSIS — N18.31 STAGE 3A CHRONIC KIDNEY DISEASE (HCC): ICD-10-CM

## 2022-04-19 DIAGNOSIS — D47.2 MGUS (MONOCLONAL GAMMOPATHY OF UNKNOWN SIGNIFICANCE): ICD-10-CM

## 2022-04-19 LAB
ALBUMIN SERPL-MCNC: 3.3 G/DL (ref 3.4–5)
ALBUMIN/GLOB SERPL: 0.7 {RATIO} (ref 1–2)
ALP LIVER SERPL-CCNC: 93 U/L
ALT SERPL-CCNC: 23 U/L
ANION GAP SERPL CALC-SCNC: 7 MMOL/L (ref 0–18)
AST SERPL-CCNC: 18 U/L (ref 15–37)
BASOPHILS # BLD AUTO: 0.04 X10(3) UL (ref 0–0.2)
BASOPHILS NFR BLD AUTO: 0.6 %
BILIRUB SERPL-MCNC: 0.6 MG/DL (ref 0.1–2)
BUN BLD-MCNC: 15 MG/DL (ref 7–18)
BUN/CREAT SERPL: 11 (ref 10–20)
CALCIUM BLD-MCNC: 8.8 MG/DL (ref 8.5–10.1)
CHLORIDE SERPL-SCNC: 104 MMOL/L (ref 98–112)
CO2 SERPL-SCNC: 31 MMOL/L (ref 21–32)
CREAT BLD-MCNC: 1.36 MG/DL
DEPRECATED RDW RBC AUTO: 50.9 FL (ref 35.1–46.3)
EOSINOPHIL # BLD AUTO: 0.3 X10(3) UL (ref 0–0.7)
EOSINOPHIL NFR BLD AUTO: 4.7 %
ERYTHROCYTE [DISTWIDTH] IN BLOOD BY AUTOMATED COUNT: 14.5 % (ref 11–15)
FASTING STATUS PATIENT QL REPORTED: YES
GLOBULIN PLAS-MCNC: 4.8 G/DL (ref 2.8–4.4)
GLUCOSE BLD-MCNC: 114 MG/DL (ref 70–99)
HCT VFR BLD AUTO: 41.5 %
HGB BLD-MCNC: 12.9 G/DL
IGA SERPL-MCNC: 353 MG/DL (ref 70–312)
IGM SERPL-MCNC: 288 MG/DL (ref 43–279)
IMM GRANULOCYTES # BLD AUTO: 0.01 X10(3) UL (ref 0–1)
IMM GRANULOCYTES NFR BLD: 0.2 %
IMMUNOGLOBULIN PNL SER-MCNC: 2150 MG/DL (ref 791–1643)
LYMPHOCYTES # BLD AUTO: 3.01 X10(3) UL (ref 1–4)
LYMPHOCYTES NFR BLD AUTO: 46.8 %
MCH RBC QN AUTO: 29.9 PG (ref 26–34)
MCHC RBC AUTO-ENTMCNC: 31.1 G/DL (ref 31–37)
MCV RBC AUTO: 96.1 FL
MONOCYTES # BLD AUTO: 0.54 X10(3) UL (ref 0.1–1)
MONOCYTES NFR BLD AUTO: 8.4 %
NEUTROPHILS # BLD AUTO: 2.53 X10 (3) UL (ref 1.5–7.7)
NEUTROPHILS # BLD AUTO: 2.53 X10(3) UL (ref 1.5–7.7)
NEUTROPHILS NFR BLD AUTO: 39.3 %
OSMOLALITY SERPL CALC.SUM OF ELEC: 296 MOSM/KG (ref 275–295)
PHOSPHATE SERPL-MCNC: 3.4 MG/DL (ref 2.5–4.9)
PLATELET # BLD AUTO: 206 10(3)UL (ref 150–450)
POTASSIUM SERPL-SCNC: 3.7 MMOL/L (ref 3.5–5.1)
PROT SERPL-MCNC: 8.1 G/DL (ref 6.4–8.2)
RBC # BLD AUTO: 4.32 X10(6)UL
SODIUM SERPL-SCNC: 142 MMOL/L (ref 136–145)
WBC # BLD AUTO: 6.4 X10(3) UL (ref 4–11)

## 2022-04-19 PROCEDURE — 85025 COMPLETE CBC W/AUTO DIFF WBC: CPT

## 2022-04-19 PROCEDURE — 84100 ASSAY OF PHOSPHORUS: CPT

## 2022-04-19 PROCEDURE — 83521 IG LIGHT CHAINS FREE EACH: CPT

## 2022-04-19 PROCEDURE — 82784 ASSAY IGA/IGD/IGG/IGM EACH: CPT

## 2022-04-19 PROCEDURE — 84165 PROTEIN E-PHORESIS SERUM: CPT

## 2022-04-19 PROCEDURE — 86334 IMMUNOFIX E-PHORESIS SERUM: CPT

## 2022-04-19 PROCEDURE — 80053 COMPREHEN METABOLIC PANEL: CPT

## 2022-04-19 PROCEDURE — 36415 COLL VENOUS BLD VENIPUNCTURE: CPT

## 2022-04-21 LAB
KAPPA LC FREE SER-MCNC: 9.31 MG/DL (ref 0.33–1.94)
KAPPA LC FREE/LAMBDA FREE SER NEPH: 1.59 {RATIO} (ref 0.26–1.65)
LAMBDA LC FREE SERPL-MCNC: 5.86 MG/DL (ref 0.57–2.63)

## 2022-04-22 ENCOUNTER — LAB ENCOUNTER (OUTPATIENT)
Dept: LAB | Facility: HOSPITAL | Age: 72
End: 2022-04-22
Attending: STUDENT IN AN ORGANIZED HEALTH CARE EDUCATION/TRAINING PROGRAM
Payer: MEDICARE

## 2022-04-22 DIAGNOSIS — D47.2 MGUS (MONOCLONAL GAMMOPATHY OF UNKNOWN SIGNIFICANCE): ICD-10-CM

## 2022-04-22 LAB
ALBUMIN SERPL ELPH-MCNC: 3.65 G/DL (ref 3.75–5.21)
ALBUMIN/GLOB SERPL: 0.82 {RATIO} (ref 1–2)
ALPHA1 GLOB SERPL ELPH-MCNC: 0.32 G/DL (ref 0.19–0.46)
ALPHA2 GLOB SERPL ELPH-MCNC: 0.76 G/DL (ref 0.48–1.05)
B-GLOBULIN SERPL ELPH-MCNC: 1.35 G/DL (ref 0.68–1.23)
GAMMA GLOB SERPL ELPH-MCNC: 2.03 G/DL (ref 0.62–1.7)
M PROTEIN 1 SERPL ELPH-MCNC: 0.13 G/DL (ref ?–0)
M PROTEIN 24H UR ELPH-MRATE: 285.8 MG/24 HR (ref ?–149.1)
PROT SERPL-MCNC: 8.1 G/DL (ref 6.4–8.2)
SPECIMEN VOL UR: 2250 ML

## 2022-04-22 PROCEDURE — 86335 IMMUNFIX E-PHORSIS/URINE/CSF: CPT

## 2022-04-22 PROCEDURE — 84166 PROTEIN E-PHORESIS/URINE/CSF: CPT

## 2022-04-22 PROCEDURE — 84156 ASSAY OF PROTEIN URINE: CPT

## 2022-04-24 ENCOUNTER — TELEPHONE (OUTPATIENT)
Dept: FAMILY MEDICINE | Age: 72
End: 2022-04-24

## 2022-04-24 RX ORDER — OMEPRAZOLE 20 MG/1
20 CAPSULE, DELAYED RELEASE ORAL DAILY
Qty: 90 CAPSULE | Refills: 0 | Status: SHIPPED | OUTPATIENT
Start: 2022-04-24 | End: 2022-07-20

## 2022-04-26 ENCOUNTER — OFFICE VISIT (OUTPATIENT)
Dept: HEMATOLOGY/ONCOLOGY | Facility: HOSPITAL | Age: 72
End: 2022-04-26
Attending: STUDENT IN AN ORGANIZED HEALTH CARE EDUCATION/TRAINING PROGRAM
Payer: MEDICARE

## 2022-04-26 VITALS
TEMPERATURE: 98 F | OXYGEN SATURATION: 94 % | WEIGHT: 293 LBS | DIASTOLIC BLOOD PRESSURE: 85 MMHG | RESPIRATION RATE: 20 BRPM | SYSTOLIC BLOOD PRESSURE: 162 MMHG | HEIGHT: 68 IN | HEART RATE: 84 BPM | BODY MASS INDEX: 44.41 KG/M2

## 2022-04-26 DIAGNOSIS — D47.2 MGUS (MONOCLONAL GAMMOPATHY OF UNKNOWN SIGNIFICANCE): Primary | ICD-10-CM

## 2022-04-26 PROCEDURE — 99213 OFFICE O/P EST LOW 20 MIN: CPT | Performed by: STUDENT IN AN ORGANIZED HEALTH CARE EDUCATION/TRAINING PROGRAM

## 2022-04-28 ENCOUNTER — OFFICE VISIT (OUTPATIENT)
Dept: RHEUMATOLOGY | Age: 72
End: 2022-04-28

## 2022-04-28 ENCOUNTER — OFFICE VISIT (OUTPATIENT)
Dept: INFUSION THERAPY | Age: 72
End: 2022-04-28
Attending: INTERNAL MEDICINE

## 2022-04-28 VITALS
TEMPERATURE: 97.8 F | WEIGHT: 293 LBS | HEIGHT: 72 IN | DIASTOLIC BLOOD PRESSURE: 78 MMHG | BODY MASS INDEX: 39.68 KG/M2 | HEART RATE: 82 BPM | SYSTOLIC BLOOD PRESSURE: 134 MMHG

## 2022-04-28 VITALS
RESPIRATION RATE: 15 BRPM | SYSTOLIC BLOOD PRESSURE: 147 MMHG | BODY MASS INDEX: 52.59 KG/M2 | OXYGEN SATURATION: 98 % | WEIGHT: 293 LBS | HEART RATE: 68 BPM | DIASTOLIC BLOOD PRESSURE: 91 MMHG

## 2022-04-28 DIAGNOSIS — M05.711 RHEUMATOID ARTHRITIS INVOLVING RIGHT SHOULDER WITH POSITIVE RHEUMATOID FACTOR (CMD): Primary | ICD-10-CM

## 2022-04-28 PROCEDURE — 10002807 HB RX 258: Performed by: INTERNAL MEDICINE

## 2022-04-28 PROCEDURE — 96415 CHEMO IV INFUSION ADDL HR: CPT

## 2022-04-28 PROCEDURE — 96413 CHEMO IV INFUSION 1 HR: CPT

## 2022-04-28 PROCEDURE — 99213 OFFICE O/P EST LOW 20 MIN: CPT | Performed by: INTERNAL MEDICINE

## 2022-04-28 PROCEDURE — 10002800 HB RX 250 W HCPCS: Performed by: INTERNAL MEDICINE

## 2022-04-28 PROCEDURE — 3078F DIAST BP <80 MM HG: CPT | Performed by: INTERNAL MEDICINE

## 2022-04-28 PROCEDURE — 3075F SYST BP GE 130 - 139MM HG: CPT | Performed by: INTERNAL MEDICINE

## 2022-04-28 PROCEDURE — 10004651 HB RX, NO CHARGE ITEM: Performed by: INTERNAL MEDICINE

## 2022-04-28 RX ORDER — DIPHENHYDRAMINE HCL 25 MG
25 CAPSULE ORAL ONCE
Status: CANCELLED | OUTPATIENT
Start: 2022-04-28 | End: 2022-04-28

## 2022-04-28 RX ORDER — FLUCONAZOLE 150 MG/1
TABLET ORAL
COMMUNITY
Start: 2022-04-27 | End: 2022-09-27

## 2022-04-28 RX ORDER — ACETAMINOPHEN 325 MG/1
650 TABLET ORAL ONCE
Status: CANCELLED | OUTPATIENT
Start: 2022-04-28 | End: 2022-04-28

## 2022-04-28 RX ORDER — DIPHENHYDRAMINE HYDROCHLORIDE 50 MG/ML
12.5 INJECTION INTRAMUSCULAR; INTRAVENOUS ONCE
Status: DISCONTINUED | OUTPATIENT
Start: 2022-04-28 | End: 2022-04-28 | Stop reason: HOSPADM

## 2022-04-28 RX ORDER — DIPHENHYDRAMINE HYDROCHLORIDE 50 MG/ML
12.5 INJECTION INTRAMUSCULAR; INTRAVENOUS ONCE
Status: CANCELLED
Start: 2022-04-28 | End: 2022-04-28

## 2022-04-28 RX ORDER — DIPHENHYDRAMINE HCL 25 MG
25 CAPSULE ORAL ONCE
Status: DISCONTINUED | OUTPATIENT
Start: 2022-04-28 | End: 2022-04-28 | Stop reason: HOSPADM

## 2022-04-28 RX ORDER — ACETAMINOPHEN 325 MG/1
650 TABLET ORAL ONCE
Status: DISCONTINUED | OUTPATIENT
Start: 2022-04-28 | End: 2022-04-28 | Stop reason: HOSPADM

## 2022-04-28 RX ORDER — 0.9 % SODIUM CHLORIDE 0.9 %
2 VIAL (ML) INJECTION PRN
Status: DISCONTINUED | OUTPATIENT
Start: 2022-04-28 | End: 2022-04-28 | Stop reason: HOSPADM

## 2022-04-28 RX ORDER — 0.9 % SODIUM CHLORIDE 0.9 %
2 VIAL (ML) INJECTION PRN
Status: CANCELLED | OUTPATIENT
Start: 2022-04-28

## 2022-04-28 RX ORDER — AMOXICILLIN AND CLAVULANATE POTASSIUM 500; 125 MG/1; MG/1
1 TABLET, FILM COATED ORAL 2 TIMES DAILY
COMMUNITY
Start: 2022-02-25 | End: 2022-11-02 | Stop reason: ALTCHOICE

## 2022-04-28 RX ADMIN — SODIUM CHLORIDE 2 ML: 9 INJECTION, SOLUTION INTRAMUSCULAR; INTRAVENOUS; SUBCUTANEOUS at 13:50

## 2022-04-28 RX ADMIN — INFLIXIMAB 700 MG: 100 INJECTION, POWDER, LYOPHILIZED, FOR SOLUTION INTRAVENOUS at 11:13

## 2022-04-28 ASSESSMENT — ENCOUNTER SYMPTOMS
SORE THROAT: 0
HEADACHES: 0
ADENOPATHY: 0
PSYCHIATRIC NEGATIVE: 1
ABDOMINAL PAIN: 0
NUMBNESS: 0
DIARRHEA: 0
NAUSEA: 0
FEVER: 0
CHEST TIGHTNESS: 0
ALLERGIC/IMMUNOLOGIC NEGATIVE: 1
ENDOCRINE NEGATIVE: 1
ACTIVITY CHANGE: 0
FATIGUE: 0
UNEXPECTED WEIGHT CHANGE: 0
EYE REDNESS: 0
CONSTIPATION: 0
SHORTNESS OF BREATH: 0
EYE PAIN: 0
COLOR CHANGE: 0
BRUISES/BLEEDS EASILY: 0

## 2022-04-28 ASSESSMENT — PAIN SCALES - GENERAL
PAINLEVEL: 0
PAINLEVEL: 0

## 2022-04-29 ENCOUNTER — TELEPHONE (OUTPATIENT)
Dept: SCHEDULING | Age: 72
End: 2022-04-29

## 2022-05-12 ENCOUNTER — TELEPHONE (OUTPATIENT)
Dept: SCHEDULING | Age: 72
End: 2022-05-12

## 2022-05-13 ENCOUNTER — TELEPHONE (OUTPATIENT)
Dept: SCHEDULING | Age: 72
End: 2022-05-13

## 2022-05-17 ENCOUNTER — TELEPHONE (OUTPATIENT)
Dept: SCHEDULING | Age: 72
End: 2022-05-17

## 2022-05-23 ENCOUNTER — TELEPHONE (OUTPATIENT)
Dept: SCHEDULING | Age: 72
End: 2022-05-23

## 2022-06-01 ENCOUNTER — LAB ENCOUNTER (OUTPATIENT)
Dept: LAB | Facility: HOSPITAL | Age: 72
End: 2022-06-01
Attending: INTERNAL MEDICINE
Payer: MEDICARE

## 2022-06-01 DIAGNOSIS — E11.49 TYPE 2 DIABETES MELLITUS WITH OTHER DIABETIC NEUROLOGICAL COMPLICATION (HCC): Primary | ICD-10-CM

## 2022-06-01 LAB
EST. AVERAGE GLUCOSE BLD GHB EST-MCNC: 160 MG/DL (ref 68–126)
HBA1C MFR BLD: 7.2 % (ref ?–5.7)

## 2022-06-01 PROCEDURE — 36415 COLL VENOUS BLD VENIPUNCTURE: CPT

## 2022-06-01 PROCEDURE — 3051F HG A1C>EQUAL 7.0%<8.0%: CPT | Performed by: INTERNAL MEDICINE

## 2022-06-01 PROCEDURE — 83036 HEMOGLOBIN GLYCOSYLATED A1C: CPT

## 2022-06-01 RX ORDER — FUROSEMIDE 20 MG/1
TABLET ORAL
Qty: 90 TABLET | Refills: 0 | Status: SHIPPED | OUTPATIENT
Start: 2022-06-01 | End: 2022-08-29

## 2022-06-09 RX ORDER — TERAZOSIN 10 MG/1
CAPSULE ORAL
Qty: 90 CAPSULE | Refills: 0 | Status: SHIPPED | OUTPATIENT
Start: 2022-06-09 | End: 2022-06-28

## 2022-06-28 DIAGNOSIS — E78.5 DYSLIPIDEMIA: ICD-10-CM

## 2022-06-28 RX ORDER — ATORVASTATIN CALCIUM 40 MG/1
TABLET, FILM COATED ORAL
Qty: 90 TABLET | Refills: 0 | Status: SHIPPED | OUTPATIENT
Start: 2022-06-28 | End: 2022-07-01

## 2022-06-28 RX ORDER — VALSARTAN AND HYDROCHLOROTHIAZIDE 320; 12.5 MG/1; MG/1
TABLET, FILM COATED ORAL
Qty: 90 TABLET | Refills: 0 | Status: SHIPPED | OUTPATIENT
Start: 2022-06-28 | End: 2022-09-19

## 2022-06-28 RX ORDER — TERAZOSIN 10 MG/1
CAPSULE ORAL
Qty: 90 CAPSULE | Refills: 0 | Status: SHIPPED | OUTPATIENT
Start: 2022-06-28 | End: 2022-11-28

## 2022-06-29 ENCOUNTER — OFFICE VISIT (OUTPATIENT)
Dept: INFUSION THERAPY | Age: 72
End: 2022-06-29
Attending: INTERNAL MEDICINE

## 2022-06-29 VITALS
WEIGHT: 293 LBS | TEMPERATURE: 97.8 F | RESPIRATION RATE: 16 BRPM | HEART RATE: 76 BPM | BODY MASS INDEX: 46.67 KG/M2 | DIASTOLIC BLOOD PRESSURE: 80 MMHG | SYSTOLIC BLOOD PRESSURE: 127 MMHG

## 2022-06-29 DIAGNOSIS — M05.711 RHEUMATOID ARTHRITIS INVOLVING RIGHT SHOULDER WITH POSITIVE RHEUMATOID FACTOR (CMD): Primary | ICD-10-CM

## 2022-06-29 PROCEDURE — 96415 CHEMO IV INFUSION ADDL HR: CPT

## 2022-06-29 PROCEDURE — 96375 TX/PRO/DX INJ NEW DRUG ADDON: CPT

## 2022-06-29 PROCEDURE — 10002800 HB RX 250 W HCPCS: Performed by: INTERNAL MEDICINE

## 2022-06-29 PROCEDURE — 96413 CHEMO IV INFUSION 1 HR: CPT

## 2022-06-29 PROCEDURE — 10002807 HB RX 258: Performed by: INTERNAL MEDICINE

## 2022-06-29 RX ORDER — DIPHENHYDRAMINE HYDROCHLORIDE 50 MG/ML
12.5 INJECTION INTRAMUSCULAR; INTRAVENOUS ONCE
Status: COMPLETED | OUTPATIENT
Start: 2022-06-29 | End: 2022-06-29

## 2022-06-29 RX ORDER — DIPHENHYDRAMINE HYDROCHLORIDE 50 MG/ML
12.5 INJECTION INTRAMUSCULAR; INTRAVENOUS ONCE
Status: CANCELLED
Start: 2022-06-29 | End: 2022-06-29

## 2022-06-29 RX ORDER — 0.9 % SODIUM CHLORIDE 0.9 %
2 VIAL (ML) INJECTION PRN
Status: CANCELLED | OUTPATIENT
Start: 2022-06-29

## 2022-06-29 RX ORDER — 0.9 % SODIUM CHLORIDE 0.9 %
2 VIAL (ML) INJECTION PRN
Status: DISCONTINUED | OUTPATIENT
Start: 2022-06-29 | End: 2022-06-29 | Stop reason: HOSPADM

## 2022-06-29 RX ORDER — DIPHENHYDRAMINE HCL 25 MG
25 CAPSULE ORAL ONCE
Status: CANCELLED | OUTPATIENT
Start: 2022-06-29 | End: 2022-06-29

## 2022-06-29 RX ORDER — ACETAMINOPHEN 325 MG/1
650 TABLET ORAL ONCE
Status: CANCELLED | OUTPATIENT
Start: 2022-06-29 | End: 2022-06-29

## 2022-06-29 RX ADMIN — INFLIXIMAB 700 MG: 100 INJECTION, POWDER, LYOPHILIZED, FOR SOLUTION INTRAVENOUS at 11:43

## 2022-06-29 RX ADMIN — DIPHENHYDRAMINE HYDROCHLORIDE 12.5 MG: 50 INJECTION INTRAMUSCULAR; INTRAVENOUS at 10:14

## 2022-06-29 ASSESSMENT — PAIN SCALES - GENERAL: PAINLEVEL: 0

## 2022-07-01 DIAGNOSIS — E78.5 DYSLIPIDEMIA: ICD-10-CM

## 2022-07-01 RX ORDER — ATORVASTATIN CALCIUM 40 MG/1
TABLET, FILM COATED ORAL
Qty: 90 TABLET | Refills: 0 | Status: SHIPPED | OUTPATIENT
Start: 2022-07-01 | End: 2022-12-21

## 2022-07-05 ENCOUNTER — E-ADVICE (OUTPATIENT)
Dept: FAMILY MEDICINE | Age: 72
End: 2022-07-05

## 2022-07-05 DIAGNOSIS — E08.40 DIABETES MELLITUS DUE TO UNDERLYING CONDITION WITH DIABETIC NEUROPATHY, WITHOUT LONG-TERM CURRENT USE OF INSULIN (CMD): Primary | ICD-10-CM

## 2022-07-07 ENCOUNTER — TELEPHONE (OUTPATIENT)
Dept: SCHEDULING | Age: 72
End: 2022-07-07

## 2022-07-20 RX ORDER — OMEPRAZOLE 20 MG/1
CAPSULE, DELAYED RELEASE ORAL
Qty: 90 CAPSULE | Refills: 0 | Status: SHIPPED | OUTPATIENT
Start: 2022-07-20 | End: 2022-10-17

## 2022-07-21 ENCOUNTER — TELEPHONE (OUTPATIENT)
Dept: SCHEDULING | Age: 72
End: 2022-07-21

## 2022-08-17 RX ORDER — FOLIC ACID 1 MG/1
TABLET ORAL
Qty: 90 TABLET | Refills: 0 | Status: SHIPPED | OUTPATIENT
Start: 2022-08-17 | End: 2022-11-07

## 2022-08-24 ENCOUNTER — OFFICE VISIT (OUTPATIENT)
Dept: INFUSION THERAPY | Age: 72
End: 2022-08-24
Attending: INTERNAL MEDICINE

## 2022-08-24 VITALS
TEMPERATURE: 97.2 F | DIASTOLIC BLOOD PRESSURE: 95 MMHG | OXYGEN SATURATION: 98 % | HEART RATE: 74 BPM | BODY MASS INDEX: 47.09 KG/M2 | WEIGHT: 293 LBS | RESPIRATION RATE: 16 BRPM | SYSTOLIC BLOOD PRESSURE: 148 MMHG

## 2022-08-24 DIAGNOSIS — M05.711 RHEUMATOID ARTHRITIS INVOLVING RIGHT SHOULDER WITH POSITIVE RHEUMATOID FACTOR (CMD): Primary | ICD-10-CM

## 2022-08-24 PROCEDURE — 96413 CHEMO IV INFUSION 1 HR: CPT

## 2022-08-24 PROCEDURE — 10004651 HB RX, NO CHARGE ITEM: Performed by: INTERNAL MEDICINE

## 2022-08-24 PROCEDURE — 96375 TX/PRO/DX INJ NEW DRUG ADDON: CPT

## 2022-08-24 PROCEDURE — 10002807 HB RX 258: Performed by: INTERNAL MEDICINE

## 2022-08-24 PROCEDURE — 10002800 HB RX 250 W HCPCS: Performed by: INTERNAL MEDICINE

## 2022-08-24 PROCEDURE — 96415 CHEMO IV INFUSION ADDL HR: CPT

## 2022-08-24 RX ORDER — 0.9 % SODIUM CHLORIDE 0.9 %
2 VIAL (ML) INJECTION PRN
Status: DISCONTINUED | OUTPATIENT
Start: 2022-08-24 | End: 2022-08-24 | Stop reason: HOSPADM

## 2022-08-24 RX ORDER — 0.9 % SODIUM CHLORIDE 0.9 %
2 VIAL (ML) INJECTION PRN
Status: CANCELLED | OUTPATIENT
Start: 2022-08-24

## 2022-08-24 RX ORDER — DIPHENHYDRAMINE HCL 25 MG
25 CAPSULE ORAL ONCE
Status: CANCELLED | OUTPATIENT
Start: 2022-08-24 | End: 2022-08-24

## 2022-08-24 RX ORDER — DIPHENHYDRAMINE HYDROCHLORIDE 50 MG/ML
12.5 INJECTION INTRAMUSCULAR; INTRAVENOUS ONCE
Status: CANCELLED
Start: 2022-08-24 | End: 2022-08-24

## 2022-08-24 RX ORDER — ACETAMINOPHEN 325 MG/1
650 TABLET ORAL ONCE
Status: CANCELLED | OUTPATIENT
Start: 2022-08-24 | End: 2022-08-24

## 2022-08-24 RX ORDER — DIPHENHYDRAMINE HYDROCHLORIDE 50 MG/ML
12.5 INJECTION INTRAMUSCULAR; INTRAVENOUS ONCE
Status: COMPLETED | OUTPATIENT
Start: 2022-08-24 | End: 2022-08-24

## 2022-08-24 RX ADMIN — SODIUM CHLORIDE, PRESERVATIVE FREE 2 ML: 5 INJECTION INTRAVENOUS at 13:21

## 2022-08-24 RX ADMIN — DIPHENHYDRAMINE HYDROCHLORIDE 12.5 MG: 50 INJECTION, SOLUTION INTRAMUSCULAR; INTRAVENOUS at 10:32

## 2022-08-24 RX ADMIN — INFLIXIMAB 700 MG: 100 INJECTION, POWDER, LYOPHILIZED, FOR SOLUTION INTRAVENOUS at 11:17

## 2022-08-24 ASSESSMENT — PAIN SCALES - GENERAL: PAINLEVEL: 2

## 2022-08-29 RX ORDER — FUROSEMIDE 20 MG/1
TABLET ORAL
Qty: 90 TABLET | Refills: 0 | Status: SHIPPED | OUTPATIENT
Start: 2022-08-29 | End: 2022-11-07

## 2022-09-19 DIAGNOSIS — E78.5 DYSLIPIDEMIA: ICD-10-CM

## 2022-09-19 RX ORDER — VALSARTAN AND HYDROCHLOROTHIAZIDE 320; 12.5 MG/1; MG/1
TABLET, FILM COATED ORAL
Qty: 90 TABLET | Refills: 0 | Status: SHIPPED | OUTPATIENT
Start: 2022-09-19 | End: 2022-12-21

## 2022-09-21 ENCOUNTER — PATIENT MESSAGE (OUTPATIENT)
Dept: NEPHROLOGY | Facility: CLINIC | Age: 72
End: 2022-09-21

## 2022-09-21 DIAGNOSIS — N18.31 STAGE 3A CHRONIC KIDNEY DISEASE (HCC): Primary | ICD-10-CM

## 2022-09-21 NOTE — TELEPHONE ENCOUNTER
Rn generated order for labs and sent pt message thru [address] [place] [address] [address].

## 2022-09-21 NOTE — TELEPHONE ENCOUNTER
From: Johnathan Ganser Ward  To: Gisele Chiang MD  Sent: 9/21/2022 2:15 PM CDT  Subject: Labs Needed Prior to Appt. Holden Dumont,    Please submit lab orders needed (by mid Oct.) prior to my 10/28/22 appt. Will be using the lab @ [address] professional bldg. Thank you.     Gabrielle

## 2022-09-27 RX ORDER — FLUCONAZOLE 150 MG/1
TABLET ORAL
Qty: 1 TABLET | Refills: 0 | Status: SHIPPED | OUTPATIENT
Start: 2022-09-27 | End: 2022-11-02 | Stop reason: ALTCHOICE

## 2022-09-30 ENCOUNTER — TELEPHONE (OUTPATIENT)
Dept: FAMILY MEDICINE | Age: 72
End: 2022-09-30

## 2022-10-17 RX ORDER — OMEPRAZOLE 20 MG/1
CAPSULE, DELAYED RELEASE ORAL
Qty: 90 CAPSULE | Refills: 0 | Status: SHIPPED | OUTPATIENT
Start: 2022-10-17 | End: 2023-01-09 | Stop reason: SDUPTHER

## 2022-10-18 ENCOUNTER — LAB ENCOUNTER (OUTPATIENT)
Dept: LAB | Facility: HOSPITAL | Age: 72
End: 2022-10-18
Attending: INTERNAL MEDICINE
Payer: MEDICARE

## 2022-10-18 DIAGNOSIS — N18.31 STAGE 3A CHRONIC KIDNEY DISEASE (HCC): ICD-10-CM

## 2022-10-18 DIAGNOSIS — D47.2 MGUS (MONOCLONAL GAMMOPATHY OF UNKNOWN SIGNIFICANCE): ICD-10-CM

## 2022-10-18 LAB
ALBUMIN SERPL-MCNC: 3.2 G/DL (ref 3.4–5)
ALBUMIN/GLOB SERPL: 0.6 {RATIO} (ref 1–2)
ALP LIVER SERPL-CCNC: 105 U/L
ALT SERPL-CCNC: 26 U/L
ANION GAP SERPL CALC-SCNC: 3 MMOL/L (ref 0–18)
AST SERPL-CCNC: 18 U/L (ref 15–37)
BASOPHILS # BLD AUTO: 0.05 X10(3) UL (ref 0–0.2)
BASOPHILS NFR BLD AUTO: 0.8 %
BILIRUB SERPL-MCNC: 0.5 MG/DL (ref 0.1–2)
BUN BLD-MCNC: 15 MG/DL (ref 7–18)
BUN/CREAT SERPL: 10.1 (ref 10–20)
CALCIUM BLD-MCNC: 8.7 MG/DL (ref 8.5–10.1)
CHLORIDE SERPL-SCNC: 106 MMOL/L (ref 98–112)
CO2 SERPL-SCNC: 31 MMOL/L (ref 21–32)
CREAT BLD-MCNC: 1.48 MG/DL
DEPRECATED RDW RBC AUTO: 50.5 FL (ref 35.1–46.3)
EOSINOPHIL # BLD AUTO: 0.24 X10(3) UL (ref 0–0.7)
EOSINOPHIL NFR BLD AUTO: 4 %
ERYTHROCYTE [DISTWIDTH] IN BLOOD BY AUTOMATED COUNT: 14.6 % (ref 11–15)
FASTING STATUS PATIENT QL REPORTED: YES
GFR SERPLBLD BASED ON 1.73 SQ M-ARVRAT: 38 ML/MIN/1.73M2 (ref 60–?)
GLOBULIN PLAS-MCNC: 5.1 G/DL (ref 2.8–4.4)
GLUCOSE BLD-MCNC: 166 MG/DL (ref 70–99)
HCT VFR BLD AUTO: 42.3 %
HGB BLD-MCNC: 13.5 G/DL
IGA SERPL-MCNC: 371 MG/DL (ref 70–312)
IGM SERPL-MCNC: 314 MG/DL (ref 43–279)
IMM GRANULOCYTES # BLD AUTO: 0.01 X10(3) UL (ref 0–1)
IMM GRANULOCYTES NFR BLD: 0.2 %
IMMUNOGLOBULIN PNL SER-MCNC: 2190 MG/DL (ref 791–1643)
LYMPHOCYTES # BLD AUTO: 2.21 X10(3) UL (ref 1–4)
LYMPHOCYTES NFR BLD AUTO: 37.1 %
MCH RBC QN AUTO: 30.7 PG (ref 26–34)
MCHC RBC AUTO-ENTMCNC: 31.9 G/DL (ref 31–37)
MCV RBC AUTO: 96.1 FL
MONOCYTES # BLD AUTO: 0.63 X10(3) UL (ref 0.1–1)
MONOCYTES NFR BLD AUTO: 10.6 %
NEUTROPHILS # BLD AUTO: 2.82 X10 (3) UL (ref 1.5–7.7)
NEUTROPHILS # BLD AUTO: 2.82 X10(3) UL (ref 1.5–7.7)
NEUTROPHILS NFR BLD AUTO: 47.3 %
OSMOLALITY SERPL CALC.SUM OF ELEC: 295 MOSM/KG (ref 275–295)
PHOSPHATE SERPL-MCNC: 2.7 MG/DL (ref 2.5–4.9)
PLATELET # BLD AUTO: 185 10(3)UL (ref 150–450)
POTASSIUM SERPL-SCNC: 3.7 MMOL/L (ref 3.5–5.1)
PROT SERPL-MCNC: 8.3 G/DL (ref 6.4–8.2)
RBC # BLD AUTO: 4.4 X10(6)UL
SODIUM SERPL-SCNC: 140 MMOL/L (ref 136–145)
WBC # BLD AUTO: 6 X10(3) UL (ref 4–11)

## 2022-10-18 PROCEDURE — 84165 PROTEIN E-PHORESIS SERUM: CPT

## 2022-10-18 PROCEDURE — 86334 IMMUNOFIX E-PHORESIS SERUM: CPT

## 2022-10-18 PROCEDURE — 80053 COMPREHEN METABOLIC PANEL: CPT

## 2022-10-18 PROCEDURE — 83521 IG LIGHT CHAINS FREE EACH: CPT

## 2022-10-18 PROCEDURE — 36415 COLL VENOUS BLD VENIPUNCTURE: CPT

## 2022-10-18 PROCEDURE — 82784 ASSAY IGA/IGD/IGG/IGM EACH: CPT

## 2022-10-18 PROCEDURE — 84100 ASSAY OF PHOSPHORUS: CPT

## 2022-10-18 PROCEDURE — 85025 COMPLETE CBC W/AUTO DIFF WBC: CPT

## 2022-10-19 ENCOUNTER — OFFICE VISIT (OUTPATIENT)
Dept: INFUSION THERAPY | Age: 72
End: 2022-10-19
Attending: INTERNAL MEDICINE

## 2022-10-19 VITALS
WEIGHT: 293 LBS | SYSTOLIC BLOOD PRESSURE: 168 MMHG | TEMPERATURE: 96.4 F | DIASTOLIC BLOOD PRESSURE: 96 MMHG | RESPIRATION RATE: 16 BRPM | OXYGEN SATURATION: 98 % | HEART RATE: 76 BPM | BODY MASS INDEX: 47.66 KG/M2

## 2022-10-19 DIAGNOSIS — M05.711 RHEUMATOID ARTHRITIS INVOLVING RIGHT SHOULDER WITH POSITIVE RHEUMATOID FACTOR (CMD): Primary | ICD-10-CM

## 2022-10-19 PROCEDURE — 96415 CHEMO IV INFUSION ADDL HR: CPT

## 2022-10-19 PROCEDURE — 10002800 HB RX 250 W HCPCS: Performed by: INTERNAL MEDICINE

## 2022-10-19 PROCEDURE — 96413 CHEMO IV INFUSION 1 HR: CPT

## 2022-10-19 PROCEDURE — 96375 TX/PRO/DX INJ NEW DRUG ADDON: CPT

## 2022-10-19 PROCEDURE — 10002807 HB RX 258: Performed by: INTERNAL MEDICINE

## 2022-10-19 RX ORDER — 0.9 % SODIUM CHLORIDE 0.9 %
2 VIAL (ML) INJECTION PRN
Status: CANCELLED | OUTPATIENT
Start: 2022-10-19

## 2022-10-19 RX ORDER — DIPHENHYDRAMINE HYDROCHLORIDE 50 MG/ML
12.5 INJECTION INTRAMUSCULAR; INTRAVENOUS ONCE
Status: COMPLETED | OUTPATIENT
Start: 2022-10-19 | End: 2022-10-19

## 2022-10-19 RX ORDER — DIPHENHYDRAMINE HYDROCHLORIDE 50 MG/ML
12.5 INJECTION INTRAMUSCULAR; INTRAVENOUS ONCE
Status: CANCELLED
Start: 2022-10-19 | End: 2022-10-19

## 2022-10-19 RX ORDER — 0.9 % SODIUM CHLORIDE 0.9 %
2 VIAL (ML) INJECTION PRN
Status: DISCONTINUED | OUTPATIENT
Start: 2022-10-19 | End: 2022-10-19 | Stop reason: HOSPADM

## 2022-10-19 RX ORDER — ACETAMINOPHEN 325 MG/1
650 TABLET ORAL ONCE
Status: CANCELLED | OUTPATIENT
Start: 2022-10-19 | End: 2022-10-19

## 2022-10-19 RX ORDER — DIPHENHYDRAMINE HCL 25 MG
25 CAPSULE ORAL ONCE
Status: CANCELLED | OUTPATIENT
Start: 2022-10-19 | End: 2022-10-19

## 2022-10-19 RX ADMIN — DIPHENHYDRAMINE HYDROCHLORIDE 12.5 MG: 50 INJECTION, SOLUTION INTRAMUSCULAR; INTRAVENOUS at 10:31

## 2022-10-19 RX ADMIN — INFLIXIMAB 700 MG: 100 INJECTION, POWDER, LYOPHILIZED, FOR SOLUTION INTRAVENOUS at 10:53

## 2022-10-19 ASSESSMENT — PAIN SCALES - GENERAL: PAINLEVEL: 0

## 2022-10-20 LAB
KAPPA LC FREE SER-MCNC: 10.87 MG/DL (ref 0.33–1.94)
KAPPA LC FREE/LAMBDA FREE SER NEPH: 1.76 {RATIO} (ref 0.26–1.65)
LAMBDA LC FREE SERPL-MCNC: 6.16 MG/DL (ref 0.57–2.63)

## 2022-10-21 LAB
ALBUMIN SERPL ELPH-MCNC: 3.66 G/DL (ref 3.75–5.21)
ALBUMIN/GLOB SERPL: 0.85 {RATIO} (ref 1–2)
ALPHA1 GLOB SERPL ELPH-MCNC: 0.28 G/DL (ref 0.19–0.46)
ALPHA2 GLOB SERPL ELPH-MCNC: 0.65 G/DL (ref 0.48–1.05)
B-GLOBULIN SERPL ELPH-MCNC: 1.33 G/DL (ref 0.68–1.23)
GAMMA GLOB SERPL ELPH-MCNC: 2.08 G/DL (ref 0.62–1.7)
M PROTEIN 1 SERPL ELPH-MCNC: 0.13 G/DL (ref ?–0)
PROT SERPL-MCNC: 8 G/DL (ref 6.4–8.2)

## 2022-10-26 ENCOUNTER — OFFICE VISIT (OUTPATIENT)
Dept: HEMATOLOGY/ONCOLOGY | Facility: HOSPITAL | Age: 72
End: 2022-10-26
Attending: STUDENT IN AN ORGANIZED HEALTH CARE EDUCATION/TRAINING PROGRAM
Payer: MEDICARE

## 2022-10-26 ENCOUNTER — EXTERNAL RECORD (OUTPATIENT)
Dept: HEALTH INFORMATION MANAGEMENT | Facility: OTHER | Age: 72
End: 2022-10-26

## 2022-10-26 VITALS
HEART RATE: 100 BPM | HEIGHT: 68 IN | WEIGHT: 293 LBS | OXYGEN SATURATION: 96 % | RESPIRATION RATE: 22 BRPM | BODY MASS INDEX: 44.41 KG/M2 | TEMPERATURE: 99 F

## 2022-10-26 DIAGNOSIS — D47.2 MGUS (MONOCLONAL GAMMOPATHY OF UNKNOWN SIGNIFICANCE): Primary | ICD-10-CM

## 2022-10-26 PROCEDURE — 99213 OFFICE O/P EST LOW 20 MIN: CPT | Performed by: STUDENT IN AN ORGANIZED HEALTH CARE EDUCATION/TRAINING PROGRAM

## 2022-10-28 ENCOUNTER — OFFICE VISIT (OUTPATIENT)
Dept: NEPHROLOGY | Facility: CLINIC | Age: 72
End: 2022-10-28
Payer: MEDICARE

## 2022-10-28 VITALS
DIASTOLIC BLOOD PRESSURE: 87 MMHG | HEART RATE: 74 BPM | WEIGHT: 293 LBS | BODY MASS INDEX: 44.41 KG/M2 | SYSTOLIC BLOOD PRESSURE: 144 MMHG | HEIGHT: 68 IN

## 2022-10-28 DIAGNOSIS — N18.32 STAGE 3B CHRONIC KIDNEY DISEASE (HCC): Primary | ICD-10-CM

## 2022-10-28 PROCEDURE — 99214 OFFICE O/P EST MOD 30 MIN: CPT | Performed by: INTERNAL MEDICINE

## 2022-10-28 PROCEDURE — 3008F BODY MASS INDEX DOCD: CPT | Performed by: INTERNAL MEDICINE

## 2022-10-28 PROCEDURE — 3077F SYST BP >= 140 MM HG: CPT | Performed by: INTERNAL MEDICINE

## 2022-10-28 PROCEDURE — 3079F DIAST BP 80-89 MM HG: CPT | Performed by: INTERNAL MEDICINE

## 2022-10-29 NOTE — PROGRESS NOTES
10/28/22        Patient: Ministerio Martins   YOB: 1950   Date of Visit: 10/28/2022       Dear  Dr. Patricia Hanson MD,      Thank you for referring Ministerio Martins to my practice. Please find my assessment and plan below. As you know she is a 68-year-old -American female with a history of adult onset diabetes mellitus, rheumatoid arthritis, anemia chronic disease, obesity, MGUS followed by hematology, hypercholesterolemia, sleep apnea and hypertension who I now had the pleasure of seeing for follow-up of chronic kidney disease stage III. Last seen in November 2021. Patient fell on the ice earlier this year and fractured her right elbow. Required surgery. Still try to get back to her range of motion. Rheumatoid arthritis seems to be under adequate control. Is try to get back to water aerobics. Has lower extremity edema which is better when she awakens in the morning. No chest pain, shortness of breath, GI or urinary tract symptoms. Physical exam her blood pressure is 144/87 with a pulse of 74 and she weighed 342 pounds. Her neck was supple without JVD. Lungs are clear. Heart revealed a regular rate and rhythm with an S4 but no gallops, murmurs or rubs. Abdomen was soft, flat, nontender without organomegaly, masses or bruits. Extremities revealed trace edema bilaterally. I reviewed her most recent labs done on October 18, 2022. Creatinine overall stable at 1.48 with an estimated GFR of 38 cc/min. Electrolytes were good. Hemoglobin 13.5. Last A1c was 7.2. I therefore reassured the patient that overall her renal function is stable. Reinforced importance of maintaining adequate hydration. Avoid nonsteroidals. Blood pressures may be borderline high. Check blood pressures daily and call me in a week. Low-salt diet. Work on weight reduction. She will continue to do CBC and renal panels quarterly.   I will see her again in 6 months for follow-up or sooner if clinically indicated. Thank you again for allowing me to participate in the care of your patient. If you have any questions please feel free to call.            Sincerely,   Catherine Bethea MD   [address]  [address] [place] [address]  [address] [facility] [phone]    Document electronically generated by:  Catherine Bethea MD

## 2022-10-29 NOTE — PATIENT INSTRUCTIONS
Check your blood pressures daily and call me in a week. Repeat your kidney blood test in 3 months. Orders are in the computer.

## 2022-11-02 ENCOUNTER — LAB SERVICES (OUTPATIENT)
Dept: LAB | Age: 72
End: 2022-11-02

## 2022-11-02 ENCOUNTER — OFFICE VISIT (OUTPATIENT)
Dept: FAMILY MEDICINE | Age: 72
End: 2022-11-02

## 2022-11-02 DIAGNOSIS — E11.22 DIABETES MELLITUS WITH STAGE 3 CHRONIC KIDNEY DISEASE (CMD): ICD-10-CM

## 2022-11-02 DIAGNOSIS — E78.5 HYPERLIPIDEMIA, UNSPECIFIED HYPERLIPIDEMIA TYPE: ICD-10-CM

## 2022-11-02 DIAGNOSIS — Z12.31 ENCOUNTER FOR SCREENING MAMMOGRAM FOR MALIGNANT NEOPLASM OF BREAST: ICD-10-CM

## 2022-11-02 DIAGNOSIS — Z00.00 MEDICARE ANNUAL WELLNESS VISIT, SUBSEQUENT: Primary | ICD-10-CM

## 2022-11-02 DIAGNOSIS — N18.30 DIABETES MELLITUS WITH STAGE 3 CHRONIC KIDNEY DISEASE (CMD): ICD-10-CM

## 2022-11-02 DIAGNOSIS — D47.2 MGUS (MONOCLONAL GAMMOPATHY OF UNKNOWN SIGNIFICANCE): ICD-10-CM

## 2022-11-02 DIAGNOSIS — I10 BENIGN ESSENTIAL HYPERTENSION: ICD-10-CM

## 2022-11-02 DIAGNOSIS — Z00.00 MEDICARE ANNUAL WELLNESS VISIT, SUBSEQUENT: ICD-10-CM

## 2022-11-02 DIAGNOSIS — E08.40 DIABETES MELLITUS DUE TO UNDERLYING CONDITION WITH DIABETIC NEUROPATHY, WITHOUT LONG-TERM CURRENT USE OF INSULIN (CMD): ICD-10-CM

## 2022-11-02 DIAGNOSIS — M05.711 RHEUMATOID ARTHRITIS INVOLVING RIGHT SHOULDER WITH POSITIVE RHEUMATOID FACTOR (CMD): ICD-10-CM

## 2022-11-02 DIAGNOSIS — Z12.11 COLON CANCER SCREENING: ICD-10-CM

## 2022-11-02 DIAGNOSIS — Z00.00 ANNUAL PHYSICAL EXAM: ICD-10-CM

## 2022-11-02 LAB
25(OH)D3+25(OH)D2 SERPL-MCNC: 43.6 NG/ML (ref 30–100)
ALBUMIN SERPL-MCNC: 3.5 G/DL (ref 3.6–5.1)
ALBUMIN/GLOB SERPL: 0.7 {RATIO} (ref 1–2.4)
ALP SERPL-CCNC: 99 UNITS/L (ref 45–117)
ALT SERPL-CCNC: 27 UNITS/L
ANION GAP SERPL CALC-SCNC: 10 MMOL/L (ref 7–19)
AST SERPL-CCNC: 20 UNITS/L
BASOPHILS # BLD: 0 K/MCL (ref 0–0.3)
BASOPHILS NFR BLD: 1 %
BILIRUB SERPL-MCNC: 0.6 MG/DL (ref 0.2–1)
BUN SERPL-MCNC: 15 MG/DL (ref 6–20)
BUN/CREAT SERPL: 13 (ref 7–25)
CALCIUM SERPL-MCNC: 9.2 MG/DL (ref 8.4–10.2)
CHLORIDE SERPL-SCNC: 106 MMOL/L (ref 97–110)
CHOLEST SERPL-MCNC: 117 MG/DL
CHOLEST/HDLC SERPL: 2 {RATIO}
CO2 SERPL-SCNC: 30 MMOL/L (ref 21–32)
CREAT SERPL-MCNC: 1.2 MG/DL (ref 0.51–0.95)
DEPRECATED RDW RBC: 51.2 FL (ref 39–50)
EOSINOPHIL # BLD: 0.3 K/MCL (ref 0–0.5)
EOSINOPHIL NFR BLD: 5 %
ERYTHROCYTE [DISTWIDTH] IN BLOOD: 14.6 % (ref 11–15)
FASTING DURATION TIME PATIENT: 12 HOURS (ref 0–999)
FASTING DURATION TIME PATIENT: 12 HOURS (ref 0–999)
GFR SERPLBLD BASED ON 1.73 SQ M-ARVRAT: 49 ML/MIN
GLOBULIN SER-MCNC: 4.9 G/DL (ref 2–4)
GLUCOSE SERPL-MCNC: 130 MG/DL (ref 70–99)
HBA1C MFR BLD: 7.4 % (ref 4.5–5.6)
HCT VFR BLD CALC: 44.1 % (ref 36–46.5)
HDLC SERPL-MCNC: 58 MG/DL
HGB BLD-MCNC: 14.2 G/DL (ref 12–15.5)
IMM GRANULOCYTES # BLD AUTO: 0 K/MCL (ref 0–0.2)
IMM GRANULOCYTES # BLD: 0 %
LDLC SERPL CALC-MCNC: 45 MG/DL
LYMPHOCYTES # BLD: 2.6 K/MCL (ref 1–4)
LYMPHOCYTES NFR BLD: 44 %
MCH RBC QN AUTO: 31.2 PG (ref 26–34)
MCHC RBC AUTO-ENTMCNC: 32.2 G/DL (ref 32–36.5)
MCV RBC AUTO: 96.9 FL (ref 78–100)
MONOCYTES # BLD: 0.6 K/MCL (ref 0.3–0.9)
MONOCYTES NFR BLD: 9 %
NEUTROPHILS # BLD: 2.4 K/MCL (ref 1.8–7.7)
NEUTROPHILS NFR BLD: 41 %
NONHDLC SERPL-MCNC: 59 MG/DL
NRBC BLD MANUAL-RTO: 0 /100 WBC
PLATELET # BLD AUTO: 187 K/MCL (ref 140–450)
POTASSIUM SERPL-SCNC: 4.3 MMOL/L (ref 3.4–5.1)
PROT SERPL-MCNC: 8.4 G/DL (ref 6.4–8.2)
RBC # BLD: 4.55 MIL/MCL (ref 4–5.2)
SODIUM SERPL-SCNC: 142 MMOL/L (ref 135–145)
TRIGL SERPL-MCNC: 69 MG/DL
TSH SERPL-ACNC: 2.19 MCUNITS/ML (ref 0.35–5)
WBC # BLD: 5.9 K/MCL (ref 4.2–11)

## 2022-11-02 PROCEDURE — 83036 HEMOGLOBIN GLYCOSYLATED A1C: CPT | Performed by: INTERNAL MEDICINE

## 2022-11-02 PROCEDURE — 3080F DIAST BP >= 90 MM HG: CPT | Performed by: FAMILY MEDICINE

## 2022-11-02 PROCEDURE — 3077F SYST BP >= 140 MM HG: CPT | Performed by: FAMILY MEDICINE

## 2022-11-02 PROCEDURE — G0439 PPPS, SUBSEQ VISIT: HCPCS | Performed by: FAMILY MEDICINE

## 2022-11-02 PROCEDURE — 82306 VITAMIN D 25 HYDROXY: CPT | Performed by: INTERNAL MEDICINE

## 2022-11-02 PROCEDURE — 80061 LIPID PANEL: CPT | Performed by: INTERNAL MEDICINE

## 2022-11-02 PROCEDURE — 99397 PER PM REEVAL EST PAT 65+ YR: CPT | Performed by: FAMILY MEDICINE

## 2022-11-02 PROCEDURE — 80050 GENERAL HEALTH PANEL: CPT | Performed by: INTERNAL MEDICINE

## 2022-11-02 PROCEDURE — 36415 COLL VENOUS BLD VENIPUNCTURE: CPT | Performed by: INTERNAL MEDICINE

## 2022-11-02 RX ORDER — FLUCONAZOLE 150 MG/1
150 TABLET ORAL
Qty: 1 TABLET | Refills: 0 | Status: SHIPPED | COMMUNITY
Start: 2022-11-02 | End: 2023-04-18 | Stop reason: SDUPTHER

## 2022-11-02 RX ORDER — AMOXICILLIN AND CLAVULANATE POTASSIUM 500; 125 MG/1; MG/1
1 TABLET, FILM COATED ORAL 2 TIMES DAILY
Status: SHIPPED | COMMUNITY
Start: 2022-11-02 | End: 2022-12-15

## 2022-11-02 RX ORDER — METOPROLOL SUCCINATE 50 MG/1
100 TABLET, EXTENDED RELEASE ORAL DAILY
Qty: 180 TABLET | Refills: 0 | Status: SHIPPED | COMMUNITY
Start: 2022-11-02

## 2022-11-02 ASSESSMENT — ACTIVITIES OF DAILY LIVING (ADL)
ADL_SCORE: 12
NEEDS_ASSIST: NO
ADL_SCORE: 12
DOING HOUSEWORK: INDEPENDENT
USING TRANSPORTATION: INDEPENDENT
RECENT_DECLINE_ADL: NO
USING TELEPHONE: INDEPENDENT
ADL_BEFORE_ADMISSION: INDEPENDENT
GROCERY SHOPPING: INDEPENDENT
ADL_SHORT_OF_BREATH: NO
EATING: INDEPENDENT
ADL_BEFORE_ADMISSION: INDEPENDENT
RECENT_DECLINE_ADL: NO
MANAGING FINANCES: INDEPENDENT
PILL BOX USED: YES
TAKING MEDICATION: INDEPENDENT
STIL DRIVING: YES
ADL_SHORT_OF_BREATH: NO
NEEDS ASSISTANCE WITH FOOD: INDEPENDENT
PREPARING MEALS: INDEPENDENT

## 2022-11-02 ASSESSMENT — PAIN SCALES - PAIN ENJOYMENT GENERAL ACTIVITY SCALE (PEG)
INTERFERED_ENJOYMENT_LIFE: 0 (DOES NOT INTERFERE)
PEG_TOTALSCORE: 1
INTERFERED_GENERAL_ACTIVITY: 0 (DOES NOT INTERFERE)
AVG_PAIN_PASTWEEK: 3

## 2022-11-02 ASSESSMENT — MINI COG
TOTAL SCORE: 5
PATIENT ABLE TO REPEAT THE 3 WORDS GIVEN PREVIOUSLY?: WAS ABLE TO REPEAT BACK 3 WORDS CORRECTLY
PATIENT ABLE TO FILL IN THE CLOCK FACE WITH 10 MINUTES PAST 11 O'CLOCK?: YES, CLOCK IS CORRECT
PATIENT WAS GIVEN REPEAT BACK WORDS FROM VERSION: 1 - BANANA SUNRISE CHAIR

## 2022-11-02 ASSESSMENT — COGNITIVE AND FUNCTIONAL STATUS - GENERAL
DO YOU HAVE DIFFICULTY DRESSING OR BATHING: NO
ARE YOU BLIND OR DO YOU HAVE SERIOUS DIFFICULTY SEEING, EVEN WHEN WEARING GLASSES: NO
BECAUSE OF A PHYSICAL, MENTAL, OR EMOTIONAL CONDITION, DO YOU HAVE DIFFICULTY DOING ERRANDS ALONE: NO
DO YOU HAVE SERIOUS DIFFICULTY WALKING OR CLIMBING STAIRS: NO
BECAUSE OF A PHYSICAL, MENTAL, OR EMOTIONAL CONDITION, DO YOU HAVE SERIOUS DIFFICULTY CONCENTRATING, REMEMBERING OR MAKING DECISIONS: NO
ARE YOU DEAF OR DO YOU HAVE SERIOUS DIFFICULTY  HEARING: NO

## 2022-11-02 ASSESSMENT — PATIENT HEALTH QUESTIONNAIRE - PHQ9
CLINICAL INTERPRETATION OF PHQ2 SCORE: NO FURTHER SCREENING NEEDED
1. LITTLE INTEREST OR PLEASURE IN DOING THINGS: NOT AT ALL
2. FEELING DOWN, DEPRESSED OR HOPELESS: NOT AT ALL
SUM OF ALL RESPONSES TO PHQ9 QUESTIONS 1 AND 2: 0
SUM OF ALL RESPONSES TO PHQ9 QUESTIONS 1 AND 2: 0

## 2022-11-02 ASSESSMENT — PAIN SCALES - GENERAL: PAINLEVEL: 0

## 2022-11-03 ENCOUNTER — TELEPHONE (OUTPATIENT)
Dept: FAMILY MEDICINE | Age: 72
End: 2022-11-03

## 2022-11-06 VITALS
HEART RATE: 73 BPM | TEMPERATURE: 97.3 F | OXYGEN SATURATION: 96 % | DIASTOLIC BLOOD PRESSURE: 94 MMHG | HEIGHT: 69 IN | WEIGHT: 293 LBS | RESPIRATION RATE: 16 BRPM | BODY MASS INDEX: 43.4 KG/M2 | SYSTOLIC BLOOD PRESSURE: 159 MMHG

## 2022-11-07 DIAGNOSIS — M06.9 RHEUMATOID ARTHRITIS (CMD): ICD-10-CM

## 2022-11-07 RX ORDER — FOLIC ACID 1 MG/1
TABLET ORAL
Qty: 90 TABLET | Refills: 0 | Status: SHIPPED | OUTPATIENT
Start: 2022-11-07 | End: 2023-02-07

## 2022-11-07 RX ORDER — FUROSEMIDE 20 MG/1
TABLET ORAL
Qty: 90 TABLET | Refills: 0 | Status: SHIPPED | OUTPATIENT
Start: 2022-11-07 | End: 2023-02-07

## 2022-11-09 DIAGNOSIS — M06.9 RHEUMATOID ARTHRITIS (CMD): ICD-10-CM

## 2022-11-28 RX ORDER — TERAZOSIN 10 MG/1
CAPSULE ORAL
Qty: 90 CAPSULE | Refills: 0 | Status: SHIPPED | OUTPATIENT
Start: 2022-11-28 | End: 2023-03-01

## 2022-11-30 ENCOUNTER — LAB SERVICES (OUTPATIENT)
Dept: LAB | Age: 72
End: 2022-11-30

## 2022-11-30 ENCOUNTER — OFFICE VISIT (OUTPATIENT)
Dept: FAMILY MEDICINE | Age: 72
End: 2022-11-30

## 2022-11-30 VITALS
HEIGHT: 68 IN | OXYGEN SATURATION: 100 % | TEMPERATURE: 97.3 F | WEIGHT: 293 LBS | DIASTOLIC BLOOD PRESSURE: 84 MMHG | BODY MASS INDEX: 44.41 KG/M2 | HEART RATE: 79 BPM | SYSTOLIC BLOOD PRESSURE: 133 MMHG | RESPIRATION RATE: 23 BRPM

## 2022-11-30 DIAGNOSIS — E78.5 HYPERLIPIDEMIA, UNSPECIFIED HYPERLIPIDEMIA TYPE: ICD-10-CM

## 2022-11-30 DIAGNOSIS — Z12.11 COLON CANCER SCREENING: ICD-10-CM

## 2022-11-30 DIAGNOSIS — I10 BENIGN ESSENTIAL HYPERTENSION: ICD-10-CM

## 2022-11-30 DIAGNOSIS — Z12.39 ENCOUNTER FOR SCREENING FOR MALIGNANT NEOPLASM OF BREAST, UNSPECIFIED SCREENING MODALITY: ICD-10-CM

## 2022-11-30 DIAGNOSIS — E11.22 DIABETES MELLITUS WITH STAGE 3 CHRONIC KIDNEY DISEASE (CMD): ICD-10-CM

## 2022-11-30 DIAGNOSIS — N18.30 DIABETES MELLITUS WITH STAGE 3 CHRONIC KIDNEY DISEASE (CMD): ICD-10-CM

## 2022-11-30 DIAGNOSIS — Z09 FOLLOW-UP EXAM: Primary | ICD-10-CM

## 2022-11-30 PROCEDURE — 3075F SYST BP GE 130 - 139MM HG: CPT | Performed by: FAMILY MEDICINE

## 2022-11-30 PROCEDURE — 99215 OFFICE O/P EST HI 40 MIN: CPT | Performed by: FAMILY MEDICINE

## 2022-11-30 PROCEDURE — 3079F DIAST BP 80-89 MM HG: CPT | Performed by: FAMILY MEDICINE

## 2022-11-30 PROCEDURE — 82274 ASSAY TEST FOR BLOOD FECAL: CPT | Performed by: INTERNAL MEDICINE

## 2022-11-30 ASSESSMENT — PATIENT HEALTH QUESTIONNAIRE - PHQ9
1. LITTLE INTEREST OR PLEASURE IN DOING THINGS: NOT AT ALL
2. FEELING DOWN, DEPRESSED OR HOPELESS: NOT AT ALL
SUM OF ALL RESPONSES TO PHQ9 QUESTIONS 1 AND 2: 0
SUM OF ALL RESPONSES TO PHQ9 QUESTIONS 1 AND 2: 0
CLINICAL INTERPRETATION OF PHQ2 SCORE: NO FURTHER SCREENING NEEDED

## 2022-11-30 ASSESSMENT — PAIN SCALES - GENERAL: PAINLEVEL: 0

## 2022-12-01 ENCOUNTER — TELEPHONE (OUTPATIENT)
Dept: FAMILY MEDICINE | Age: 72
End: 2022-12-01

## 2022-12-01 LAB — HEMOCCULT STL QL: NEGATIVE

## 2022-12-08 RX ORDER — 0.9 % SODIUM CHLORIDE 0.9 %
2 VIAL (ML) INJECTION PRN
Status: CANCELLED | OUTPATIENT
Start: 2022-12-08

## 2022-12-08 RX ORDER — SODIUM CHLORIDE 9 MG/ML
INJECTION, SOLUTION INTRAVENOUS CONTINUOUS PRN
Status: CANCELLED | OUTPATIENT
Start: 2022-12-08

## 2022-12-08 RX ORDER — ACETAMINOPHEN 325 MG/1
650 TABLET ORAL ONCE
Status: CANCELLED | OUTPATIENT
Start: 2022-12-08 | End: 2022-12-08

## 2022-12-08 RX ORDER — METHYLPREDNISOLONE SODIUM SUCCINATE 125 MG/2ML
125 INJECTION, POWDER, LYOPHILIZED, FOR SOLUTION INTRAMUSCULAR; INTRAVENOUS
Status: CANCELLED | OUTPATIENT
Start: 2022-12-08

## 2022-12-08 RX ORDER — FAMOTIDINE 10 MG/ML
20 INJECTION, SOLUTION INTRAVENOUS
Status: CANCELLED | OUTPATIENT
Start: 2022-12-08

## 2022-12-08 RX ORDER — DIPHENHYDRAMINE HCL 25 MG
25 CAPSULE ORAL ONCE
Status: CANCELLED | OUTPATIENT
Start: 2022-12-08 | End: 2022-12-08

## 2022-12-08 RX ORDER — DIPHENHYDRAMINE HYDROCHLORIDE 50 MG/ML
50 INJECTION INTRAMUSCULAR; INTRAVENOUS
Status: CANCELLED | OUTPATIENT
Start: 2022-12-08

## 2022-12-15 ENCOUNTER — OFFICE VISIT (OUTPATIENT)
Dept: RHEUMATOLOGY | Age: 72
End: 2022-12-15

## 2022-12-15 ENCOUNTER — OFFICE VISIT (OUTPATIENT)
Dept: INFUSION THERAPY | Age: 72
End: 2022-12-15
Attending: FAMILY MEDICINE

## 2022-12-15 VITALS
DIASTOLIC BLOOD PRESSURE: 89 MMHG | HEIGHT: 68 IN | WEIGHT: 293 LBS | HEART RATE: 87 BPM | BODY MASS INDEX: 44.41 KG/M2 | TEMPERATURE: 97 F | SYSTOLIC BLOOD PRESSURE: 171 MMHG | RESPIRATION RATE: 18 BRPM

## 2022-12-15 VITALS
HEART RATE: 75 BPM | WEIGHT: 293 LBS | RESPIRATION RATE: 18 BRPM | TEMPERATURE: 97.3 F | OXYGEN SATURATION: 100 % | BODY MASS INDEX: 52.56 KG/M2 | DIASTOLIC BLOOD PRESSURE: 93 MMHG | SYSTOLIC BLOOD PRESSURE: 163 MMHG

## 2022-12-15 DIAGNOSIS — M05.79 RHEUMATOID ARTHRITIS INVOLVING MULTIPLE SITES WITH POSITIVE RHEUMATOID FACTOR (CMD): Primary | ICD-10-CM

## 2022-12-15 DIAGNOSIS — M05.711 RHEUMATOID ARTHRITIS INVOLVING RIGHT SHOULDER WITH POSITIVE RHEUMATOID FACTOR (CMD): Primary | ICD-10-CM

## 2022-12-15 PROCEDURE — 99213 OFFICE O/P EST LOW 20 MIN: CPT | Performed by: INTERNAL MEDICINE

## 2022-12-15 PROCEDURE — 96413 CHEMO IV INFUSION 1 HR: CPT

## 2022-12-15 PROCEDURE — 10002800 HB RX 250 W HCPCS: Performed by: INTERNAL MEDICINE

## 2022-12-15 PROCEDURE — 10002807 HB RX 258: Performed by: INTERNAL MEDICINE

## 2022-12-15 PROCEDURE — 3077F SYST BP >= 140 MM HG: CPT | Performed by: INTERNAL MEDICINE

## 2022-12-15 PROCEDURE — 96375 TX/PRO/DX INJ NEW DRUG ADDON: CPT

## 2022-12-15 PROCEDURE — 96415 CHEMO IV INFUSION ADDL HR: CPT

## 2022-12-15 PROCEDURE — 3079F DIAST BP 80-89 MM HG: CPT | Performed by: INTERNAL MEDICINE

## 2022-12-15 PROCEDURE — 10004651 HB RX, NO CHARGE ITEM: Performed by: INTERNAL MEDICINE

## 2022-12-15 RX ORDER — METHYLPREDNISOLONE SODIUM SUCCINATE 125 MG/2ML
125 INJECTION, POWDER, LYOPHILIZED, FOR SOLUTION INTRAMUSCULAR; INTRAVENOUS
Status: CANCELLED | OUTPATIENT
Start: 2022-12-15

## 2022-12-15 RX ORDER — DIPHENHYDRAMINE HCL 25 MG
25 CAPSULE ORAL ONCE
Status: CANCELLED | OUTPATIENT
Start: 2022-12-15 | End: 2022-12-15

## 2022-12-15 RX ORDER — FAMOTIDINE 10 MG/ML
20 INJECTION, SOLUTION INTRAVENOUS
Status: CANCELLED | OUTPATIENT
Start: 2022-12-15

## 2022-12-15 RX ORDER — 0.9 % SODIUM CHLORIDE 0.9 %
2 VIAL (ML) INJECTION PRN
Status: DISCONTINUED | OUTPATIENT
Start: 2022-12-15 | End: 2022-12-15 | Stop reason: HOSPADM

## 2022-12-15 RX ORDER — SODIUM CHLORIDE 9 MG/ML
INJECTION, SOLUTION INTRAVENOUS CONTINUOUS PRN
Status: CANCELLED | OUTPATIENT
Start: 2022-12-15

## 2022-12-15 RX ORDER — DIPHENHYDRAMINE HYDROCHLORIDE 50 MG/ML
12.5 INJECTION INTRAMUSCULAR; INTRAVENOUS
Status: DISCONTINUED | OUTPATIENT
Start: 2022-12-15 | End: 2022-12-15 | Stop reason: HOSPADM

## 2022-12-15 RX ORDER — ACETAMINOPHEN 325 MG/1
650 TABLET ORAL ONCE
Status: DISCONTINUED | OUTPATIENT
Start: 2022-12-15 | End: 2022-12-15 | Stop reason: HOSPADM

## 2022-12-15 RX ORDER — 0.9 % SODIUM CHLORIDE 0.9 %
2 VIAL (ML) INJECTION PRN
Status: CANCELLED | OUTPATIENT
Start: 2022-12-15

## 2022-12-15 RX ORDER — DIPHENHYDRAMINE HCL 25 MG
25 CAPSULE ORAL ONCE
Status: DISCONTINUED | OUTPATIENT
Start: 2022-12-15 | End: 2022-12-15 | Stop reason: HOSPADM

## 2022-12-15 RX ORDER — DIPHENHYDRAMINE HYDROCHLORIDE 50 MG/ML
50 INJECTION INTRAMUSCULAR; INTRAVENOUS
Status: CANCELLED | OUTPATIENT
Start: 2022-12-15

## 2022-12-15 RX ORDER — ACETAMINOPHEN 325 MG/1
650 TABLET ORAL ONCE
Status: CANCELLED | OUTPATIENT
Start: 2022-12-15 | End: 2022-12-15

## 2022-12-15 RX ADMIN — DIPHENHYDRAMINE HYDROCHLORIDE 12.5 MG: 50 INJECTION, SOLUTION INTRAMUSCULAR; INTRAVENOUS at 11:11

## 2022-12-15 RX ADMIN — INFLIXIMAB 700 MG: 100 INJECTION, POWDER, LYOPHILIZED, FOR SOLUTION INTRAVENOUS at 11:00

## 2022-12-15 RX ADMIN — SODIUM CHLORIDE, PRESERVATIVE FREE 2 ML: 5 INJECTION INTRAVENOUS at 12:55

## 2022-12-15 ASSESSMENT — ENCOUNTER SYMPTOMS
FATIGUE: 0
CHEST TIGHTNESS: 0
SORE THROAT: 0
ABDOMINAL PAIN: 0
ADENOPATHY: 0
UNEXPECTED WEIGHT CHANGE: 0
BRUISES/BLEEDS EASILY: 0
ALLERGIC/IMMUNOLOGIC NEGATIVE: 1
PSYCHIATRIC NEGATIVE: 1
SHORTNESS OF BREATH: 1
EYE REDNESS: 0
CONSTIPATION: 0
COLOR CHANGE: 0
ACTIVITY CHANGE: 0
FEVER: 0
HEADACHES: 0
NUMBNESS: 0
NAUSEA: 0
ENDOCRINE NEGATIVE: 1
DIARRHEA: 0
EYE PAIN: 0

## 2022-12-15 ASSESSMENT — JOINT PAIN
TOTAL NUMBER OF TENDER JOINTS: 0
TOTAL NUMBER OF SWOLLEN JOINTS: 0

## 2022-12-15 ASSESSMENT — ACTIVITIES OF DAILY LIVING (ADL)
ADL_BEFORE_ADMISSION: INDEPENDENT
ADL_SCORE: 12

## 2022-12-15 ASSESSMENT — PAIN SCALES - GENERAL
PAINLEVEL_OUTOF10: 0
PAINLEVEL: 0
PAINLEVEL: 0

## 2022-12-21 DIAGNOSIS — E78.5 DYSLIPIDEMIA: ICD-10-CM

## 2022-12-21 RX ORDER — ATORVASTATIN CALCIUM 40 MG/1
TABLET, FILM COATED ORAL
Qty: 90 TABLET | Refills: 0 | Status: SHIPPED | OUTPATIENT
Start: 2022-12-21 | End: 2023-01-01 | Stop reason: SDUPTHER

## 2022-12-21 RX ORDER — VALSARTAN AND HYDROCHLOROTHIAZIDE 320; 12.5 MG/1; MG/1
TABLET, FILM COATED ORAL
Qty: 90 TABLET | Refills: 0 | Status: SHIPPED | OUTPATIENT
Start: 2022-12-21 | End: 2023-03-15

## 2022-12-31 DIAGNOSIS — E78.5 DYSLIPIDEMIA: ICD-10-CM

## 2023-01-01 RX ORDER — ATORVASTATIN CALCIUM 40 MG/1
TABLET, FILM COATED ORAL
Qty: 90 TABLET | Refills: 0 | Status: SHIPPED | OUTPATIENT
Start: 2023-01-01 | End: 2023-06-20

## 2023-01-05 ENCOUNTER — EXTERNAL RECORD (OUTPATIENT)
Dept: HEALTH INFORMATION MANAGEMENT | Facility: OTHER | Age: 73
End: 2023-01-05

## 2023-01-05 ENCOUNTER — HOSPITAL ENCOUNTER (OUTPATIENT)
Dept: MAMMOGRAPHY | Facility: HOSPITAL | Age: 73
Discharge: HOME | End: 2023-01-05
Attending: FAMILY MEDICINE
Payer: MEDICARE

## 2023-01-05 DIAGNOSIS — Z12.31 ENCOUNTER FOR SCREENING MAMMOGRAM FOR MALIGNANT NEOPLASM OF BREAST: ICD-10-CM

## 2023-01-05 PROCEDURE — 77063 BREAST TOMOSYNTHESIS BI: CPT | Performed by: FAMILY MEDICINE

## 2023-01-05 PROCEDURE — 77067 SCR MAMMO BI INCL CAD: CPT | Performed by: FAMILY MEDICINE

## 2023-01-08 LAB — HM MAMMOGRAPHY BILATERAL: NORMAL

## 2023-01-09 RX ORDER — OMEPRAZOLE 20 MG/1
CAPSULE, DELAYED RELEASE ORAL
Qty: 90 CAPSULE | Refills: 0 | Status: SHIPPED | OUTPATIENT
Start: 2023-01-09 | End: 2023-04-15

## 2023-01-10 ENCOUNTER — CLINICAL DOCUMENTATION (OUTPATIENT)
Dept: FAMILY MEDICINE | Age: 73
End: 2023-01-10

## 2023-01-19 ENCOUNTER — TELEPHONE (OUTPATIENT)
Dept: FAMILY MEDICINE | Age: 73
End: 2023-01-19

## 2023-01-25 ENCOUNTER — EXTERNAL RECORD (OUTPATIENT)
Dept: HEALTH INFORMATION MANAGEMENT | Facility: OTHER | Age: 73
End: 2023-01-25

## 2023-02-07 RX ORDER — FOLIC ACID 1 MG/1
TABLET ORAL
Qty: 90 TABLET | Refills: 0 | Status: SHIPPED | OUTPATIENT
Start: 2023-02-07 | End: 2023-05-02 | Stop reason: SDUPTHER

## 2023-02-07 RX ORDER — FUROSEMIDE 20 MG/1
TABLET ORAL
Qty: 90 TABLET | Refills: 0 | Status: SHIPPED | OUTPATIENT
Start: 2023-02-07 | End: 2023-05-02 | Stop reason: SDUPTHER

## 2023-02-13 RX ORDER — LANCETS
EACH MISCELLANEOUS
Qty: 100 EACH | Refills: 3 | Status: SHIPPED | OUTPATIENT
Start: 2023-02-13 | End: 2023-02-21 | Stop reason: SDUPTHER

## 2023-02-15 ENCOUNTER — OFFICE VISIT (OUTPATIENT)
Dept: INFUSION THERAPY | Age: 73
End: 2023-02-15
Attending: FAMILY MEDICINE

## 2023-02-15 VITALS
SYSTOLIC BLOOD PRESSURE: 166 MMHG | RESPIRATION RATE: 17 BRPM | DIASTOLIC BLOOD PRESSURE: 86 MMHG | HEART RATE: 67 BPM | WEIGHT: 293 LBS | TEMPERATURE: 97.1 F | OXYGEN SATURATION: 95 % | BODY MASS INDEX: 52.73 KG/M2

## 2023-02-15 DIAGNOSIS — M05.711 RHEUMATOID ARTHRITIS INVOLVING RIGHT SHOULDER WITH POSITIVE RHEUMATOID FACTOR (CMD): Primary | ICD-10-CM

## 2023-02-15 PROCEDURE — 96413 CHEMO IV INFUSION 1 HR: CPT

## 2023-02-15 PROCEDURE — 96375 TX/PRO/DX INJ NEW DRUG ADDON: CPT

## 2023-02-15 PROCEDURE — 96415 CHEMO IV INFUSION ADDL HR: CPT

## 2023-02-15 PROCEDURE — 10002800 HB RX 250 W HCPCS: Performed by: FAMILY MEDICINE

## 2023-02-15 PROCEDURE — 10002800 HB RX 250 W HCPCS: Performed by: INTERNAL MEDICINE

## 2023-02-15 PROCEDURE — 10002807 HB RX 258: Performed by: INTERNAL MEDICINE

## 2023-02-15 PROCEDURE — 10002803 HB RX 637: Performed by: INTERNAL MEDICINE

## 2023-02-15 RX ORDER — DIPHENHYDRAMINE HYDROCHLORIDE 50 MG/ML
12.5 INJECTION INTRAMUSCULAR; INTRAVENOUS ONCE
Status: COMPLETED | OUTPATIENT
Start: 2023-02-15 | End: 2023-02-15

## 2023-02-15 RX ORDER — DIPHENHYDRAMINE HYDROCHLORIDE 50 MG/ML
50 INJECTION INTRAMUSCULAR; INTRAVENOUS
Status: DISCONTINUED | OUTPATIENT
Start: 2023-02-15 | End: 2023-02-15 | Stop reason: HOSPADM

## 2023-02-15 RX ORDER — FAMOTIDINE 10 MG/ML
20 INJECTION, SOLUTION INTRAVENOUS
Status: DISCONTINUED | OUTPATIENT
Start: 2023-02-15 | End: 2023-02-15 | Stop reason: HOSPADM

## 2023-02-15 RX ORDER — DIPHENHYDRAMINE HYDROCHLORIDE 50 MG/ML
12.5 INJECTION INTRAMUSCULAR; INTRAVENOUS ONCE
Status: CANCELLED
Start: 2023-02-15 | End: 2023-02-15

## 2023-02-15 RX ORDER — DIPHENHYDRAMINE HCL 25 MG
25 CAPSULE ORAL ONCE
Status: DISCONTINUED | OUTPATIENT
Start: 2023-02-15 | End: 2023-02-15 | Stop reason: HOSPADM

## 2023-02-15 RX ORDER — ACETAMINOPHEN 325 MG/1
650 TABLET ORAL ONCE
Status: DISCONTINUED | OUTPATIENT
Start: 2023-02-15 | End: 2023-02-15 | Stop reason: HOSPADM

## 2023-02-15 RX ORDER — SODIUM CHLORIDE 9 MG/ML
INJECTION, SOLUTION INTRAVENOUS CONTINUOUS PRN
Status: CANCELLED | OUTPATIENT
Start: 2023-02-15

## 2023-02-15 RX ORDER — 0.9 % SODIUM CHLORIDE 0.9 %
2 VIAL (ML) INJECTION PRN
Status: DISCONTINUED | OUTPATIENT
Start: 2023-02-15 | End: 2023-02-15 | Stop reason: HOSPADM

## 2023-02-15 RX ORDER — SODIUM CHLORIDE 9 MG/ML
INJECTION, SOLUTION INTRAVENOUS CONTINUOUS PRN
Status: DISCONTINUED | OUTPATIENT
Start: 2023-02-15 | End: 2023-02-15 | Stop reason: HOSPADM

## 2023-02-15 RX ORDER — DIPHENHYDRAMINE HCL 25 MG
25 CAPSULE ORAL ONCE
Status: CANCELLED | OUTPATIENT
Start: 2023-02-15 | End: 2023-02-15

## 2023-02-15 RX ORDER — ACETAMINOPHEN 325 MG/1
650 TABLET ORAL ONCE
Status: CANCELLED | OUTPATIENT
Start: 2023-02-15 | End: 2023-02-15

## 2023-02-15 RX ORDER — METHYLPREDNISOLONE SODIUM SUCCINATE 125 MG/2ML
125 INJECTION, POWDER, LYOPHILIZED, FOR SOLUTION INTRAMUSCULAR; INTRAVENOUS
Status: DISCONTINUED | OUTPATIENT
Start: 2023-02-15 | End: 2023-02-15 | Stop reason: HOSPADM

## 2023-02-15 RX ORDER — METHYLPREDNISOLONE SODIUM SUCCINATE 125 MG/2ML
125 INJECTION, POWDER, LYOPHILIZED, FOR SOLUTION INTRAMUSCULAR; INTRAVENOUS
Status: CANCELLED | OUTPATIENT
Start: 2023-02-15

## 2023-02-15 RX ORDER — FAMOTIDINE 10 MG/ML
20 INJECTION, SOLUTION INTRAVENOUS
Status: CANCELLED | OUTPATIENT
Start: 2023-02-15

## 2023-02-15 RX ORDER — DIPHENHYDRAMINE HYDROCHLORIDE 50 MG/ML
50 INJECTION INTRAMUSCULAR; INTRAVENOUS
Status: CANCELLED | OUTPATIENT
Start: 2023-02-15

## 2023-02-15 RX ORDER — 0.9 % SODIUM CHLORIDE 0.9 %
2 VIAL (ML) INJECTION PRN
Status: CANCELLED | OUTPATIENT
Start: 2023-02-15

## 2023-02-15 RX ADMIN — DIPHENHYDRAMINE HYDROCHLORIDE 12.5 MG: 50 INJECTION, SOLUTION INTRAMUSCULAR; INTRAVENOUS at 11:17

## 2023-02-15 RX ADMIN — INFLIXIMAB 700 MG: 100 INJECTION, POWDER, LYOPHILIZED, FOR SOLUTION INTRAVENOUS at 11:05

## 2023-02-15 RX ADMIN — SODIUM CHLORIDE 250 ML: 9 INJECTION, SOLUTION INTRAVENOUS at 10:47

## 2023-02-15 ASSESSMENT — ACTIVITIES OF DAILY LIVING (ADL)
ADL_SCORE: 12
ADL_BEFORE_ADMISSION: INDEPENDENT

## 2023-02-15 ASSESSMENT — PAIN SCALES - GENERAL: PAINLEVEL: 0

## 2023-02-21 ENCOUNTER — TELEPHONE (OUTPATIENT)
Dept: FAMILY MEDICINE | Age: 73
End: 2023-02-21

## 2023-02-21 RX ORDER — LANCETS
EACH MISCELLANEOUS
Qty: 100 EACH | Refills: 3 | Status: SHIPPED | OUTPATIENT
Start: 2023-02-21

## 2023-03-01 RX ORDER — TERAZOSIN 10 MG/1
CAPSULE ORAL
Qty: 90 CAPSULE | Refills: 0 | Status: SHIPPED | OUTPATIENT
Start: 2023-03-01 | End: 2023-06-01

## 2023-03-15 DIAGNOSIS — E78.5 DYSLIPIDEMIA: ICD-10-CM

## 2023-03-15 RX ORDER — VALSARTAN AND HYDROCHLOROTHIAZIDE 320; 12.5 MG/1; MG/1
TABLET, FILM COATED ORAL
Qty: 90 TABLET | Refills: 0 | Status: SHIPPED | OUTPATIENT
Start: 2023-03-15 | End: 2023-07-03

## 2023-03-23 ENCOUNTER — EXTERNAL RECORD (OUTPATIENT)
Dept: OTHER | Age: 73
End: 2023-03-23

## 2023-04-03 LAB — RETINOPATHY PRESENT (RTP): NO

## 2023-04-13 ENCOUNTER — OFFICE VISIT (OUTPATIENT)
Dept: INFUSION THERAPY | Age: 73
End: 2023-04-13
Attending: INTERNAL MEDICINE

## 2023-04-13 ENCOUNTER — APPOINTMENT (OUTPATIENT)
Dept: RHEUMATOLOGY | Age: 73
End: 2023-04-13

## 2023-04-13 VITALS
TEMPERATURE: 96.5 F | WEIGHT: 293 LBS | SYSTOLIC BLOOD PRESSURE: 175 MMHG | DIASTOLIC BLOOD PRESSURE: 105 MMHG | HEART RATE: 70 BPM | RESPIRATION RATE: 16 BRPM | BODY MASS INDEX: 52.73 KG/M2 | OXYGEN SATURATION: 96 %

## 2023-04-13 DIAGNOSIS — M05.711 RHEUMATOID ARTHRITIS INVOLVING RIGHT SHOULDER WITH POSITIVE RHEUMATOID FACTOR (CMD): Primary | ICD-10-CM

## 2023-04-13 PROCEDURE — 10002800 HB RX 250 W HCPCS: Performed by: FAMILY MEDICINE

## 2023-04-13 PROCEDURE — 10002807 HB RX 258: Performed by: INTERNAL MEDICINE

## 2023-04-13 PROCEDURE — 10004651 HB RX, NO CHARGE ITEM: Performed by: INTERNAL MEDICINE

## 2023-04-13 PROCEDURE — 96413 CHEMO IV INFUSION 1 HR: CPT

## 2023-04-13 PROCEDURE — 96375 TX/PRO/DX INJ NEW DRUG ADDON: CPT

## 2023-04-13 PROCEDURE — 96415 CHEMO IV INFUSION ADDL HR: CPT

## 2023-04-13 PROCEDURE — 10002800 HB RX 250 W HCPCS: Performed by: INTERNAL MEDICINE

## 2023-04-13 RX ORDER — FAMOTIDINE 10 MG/ML
20 INJECTION, SOLUTION INTRAVENOUS
Status: CANCELLED | OUTPATIENT
Start: 2023-04-13

## 2023-04-13 RX ORDER — SODIUM CHLORIDE 9 MG/ML
INJECTION, SOLUTION INTRAVENOUS CONTINUOUS PRN
Status: CANCELLED | OUTPATIENT
Start: 2023-04-13

## 2023-04-13 RX ORDER — DIPHENHYDRAMINE HYDROCHLORIDE 50 MG/ML
12.5 INJECTION INTRAMUSCULAR; INTRAVENOUS ONCE
Status: CANCELLED
Start: 2023-04-13 | End: 2023-04-13

## 2023-04-13 RX ORDER — DIPHENHYDRAMINE HCL 25 MG
25 CAPSULE ORAL ONCE
Status: CANCELLED | OUTPATIENT
Start: 2023-04-13 | End: 2023-04-13

## 2023-04-13 RX ORDER — DIPHENHYDRAMINE HYDROCHLORIDE 50 MG/ML
50 INJECTION INTRAMUSCULAR; INTRAVENOUS
Status: CANCELLED | OUTPATIENT
Start: 2023-04-13

## 2023-04-13 RX ORDER — 0.9 % SODIUM CHLORIDE 0.9 %
2 VIAL (ML) INJECTION PRN
Status: CANCELLED | OUTPATIENT
Start: 2023-04-13

## 2023-04-13 RX ORDER — DIPHENHYDRAMINE HYDROCHLORIDE 50 MG/ML
12.5 INJECTION INTRAMUSCULAR; INTRAVENOUS ONCE
Status: COMPLETED | OUTPATIENT
Start: 2023-04-13 | End: 2023-04-13

## 2023-04-13 RX ORDER — ACETAMINOPHEN 325 MG/1
650 TABLET ORAL ONCE
Status: CANCELLED | OUTPATIENT
Start: 2023-04-13 | End: 2023-04-13

## 2023-04-13 RX ORDER — METHYLPREDNISOLONE SODIUM SUCCINATE 125 MG/2ML
125 INJECTION, POWDER, LYOPHILIZED, FOR SOLUTION INTRAMUSCULAR; INTRAVENOUS
Status: CANCELLED | OUTPATIENT
Start: 2023-04-13

## 2023-04-13 RX ORDER — 0.9 % SODIUM CHLORIDE 0.9 %
2 VIAL (ML) INJECTION PRN
Status: DISCONTINUED | OUTPATIENT
Start: 2023-04-13 | End: 2023-04-13 | Stop reason: HOSPADM

## 2023-04-13 RX ADMIN — DIPHENHYDRAMINE HYDROCHLORIDE 12.5 MG: 50 INJECTION, SOLUTION INTRAMUSCULAR; INTRAVENOUS at 11:09

## 2023-04-13 RX ADMIN — SODIUM CHLORIDE 250 ML: 9 INJECTION, SOLUTION INTRAVENOUS at 10:26

## 2023-04-13 RX ADMIN — SODIUM CHLORIDE, PRESERVATIVE FREE 2 ML: 5 INJECTION INTRAVENOUS at 13:09

## 2023-04-13 RX ADMIN — INFLIXIMAB 700 MG: 100 INJECTION, POWDER, LYOPHILIZED, FOR SOLUTION INTRAVENOUS at 11:08

## 2023-04-13 ASSESSMENT — ACTIVITIES OF DAILY LIVING (ADL)
ADL_BEFORE_ADMISSION: INDEPENDENT
ADL_SCORE: 12

## 2023-04-13 ASSESSMENT — PAIN SCALES - GENERAL
PAINLEVEL: 0
PAINLEVEL_OUTOF10: 0

## 2023-04-15 RX ORDER — OMEPRAZOLE 20 MG/1
CAPSULE, DELAYED RELEASE ORAL
Qty: 90 CAPSULE | Refills: 0 | Status: SHIPPED | OUTPATIENT
Start: 2023-04-15 | End: 2023-07-17 | Stop reason: SDUPTHER

## 2023-04-18 ENCOUNTER — TELEPHONE (OUTPATIENT)
Dept: FAMILY MEDICINE | Age: 73
End: 2023-04-18

## 2023-04-18 RX ORDER — FLUCONAZOLE 150 MG/1
150 TABLET ORAL DAILY
Qty: 1 TABLET | Refills: 3 | Status: SHIPPED | OUTPATIENT
Start: 2023-04-18 | End: 2023-04-19

## 2023-05-02 ENCOUNTER — OFFICE VISIT (OUTPATIENT)
Dept: FAMILY MEDICINE | Age: 73
End: 2023-05-02

## 2023-05-02 VITALS
RESPIRATION RATE: 16 BRPM | OXYGEN SATURATION: 98 % | BODY MASS INDEX: 44.41 KG/M2 | WEIGHT: 293 LBS | HEART RATE: 74 BPM | HEIGHT: 68 IN | TEMPERATURE: 96.9 F | DIASTOLIC BLOOD PRESSURE: 84 MMHG | SYSTOLIC BLOOD PRESSURE: 130 MMHG

## 2023-05-02 DIAGNOSIS — N18.30 DIABETES MELLITUS WITH STAGE 3 CHRONIC KIDNEY DISEASE (CMD): ICD-10-CM

## 2023-05-02 DIAGNOSIS — E11.22 DIABETES MELLITUS WITH STAGE 3 CHRONIC KIDNEY DISEASE (CMD): ICD-10-CM

## 2023-05-02 DIAGNOSIS — I10 BENIGN ESSENTIAL HYPERTENSION: ICD-10-CM

## 2023-05-02 DIAGNOSIS — Z01.30 BLOOD PRESSURE CHECK: Primary | ICD-10-CM

## 2023-05-02 DIAGNOSIS — E78.5 HYPERLIPIDEMIA, UNSPECIFIED HYPERLIPIDEMIA TYPE: ICD-10-CM

## 2023-05-02 PROCEDURE — 99214 OFFICE O/P EST MOD 30 MIN: CPT | Performed by: FAMILY MEDICINE

## 2023-05-02 PROCEDURE — 3075F SYST BP GE 130 - 139MM HG: CPT | Performed by: FAMILY MEDICINE

## 2023-05-02 PROCEDURE — 3079F DIAST BP 80-89 MM HG: CPT | Performed by: FAMILY MEDICINE

## 2023-05-02 RX ORDER — FUROSEMIDE 20 MG/1
20 TABLET ORAL DAILY
Qty: 90 TABLET | Refills: 0 | Status: SHIPPED | OUTPATIENT
Start: 2023-05-02 | End: 2023-08-01

## 2023-05-02 RX ORDER — FOLIC ACID 1 MG/1
1 TABLET ORAL DAILY
Qty: 90 TABLET | Refills: 0 | Status: SHIPPED | OUTPATIENT
Start: 2023-05-02 | End: 2023-08-01

## 2023-05-02 ASSESSMENT — PATIENT HEALTH QUESTIONNAIRE - PHQ9
2. FEELING DOWN, DEPRESSED OR HOPELESS: NOT AT ALL
CLINICAL INTERPRETATION OF PHQ2 SCORE: NO FURTHER SCREENING NEEDED
SUM OF ALL RESPONSES TO PHQ9 QUESTIONS 1 AND 2: 0
1. LITTLE INTEREST OR PLEASURE IN DOING THINGS: NOT AT ALL
SUM OF ALL RESPONSES TO PHQ9 QUESTIONS 1 AND 2: 0

## 2023-05-02 ASSESSMENT — COGNITIVE AND FUNCTIONAL STATUS - GENERAL
DO YOU HAVE SERIOUS DIFFICULTY WALKING OR CLIMBING STAIRS: NO
BECAUSE OF A PHYSICAL, MENTAL, OR EMOTIONAL CONDITION, DO YOU HAVE DIFFICULTY DOING ERRANDS ALONE: NO
BECAUSE OF A PHYSICAL, MENTAL, OR EMOTIONAL CONDITION, DO YOU HAVE SERIOUS DIFFICULTY CONCENTRATING, REMEMBERING OR MAKING DECISIONS: NO
DO YOU HAVE DIFFICULTY DRESSING OR BATHING: NO

## 2023-05-08 DIAGNOSIS — M06.9 RHEUMATOID ARTHRITIS (CMD): ICD-10-CM

## 2023-05-18 ENCOUNTER — LAB SERVICES (OUTPATIENT)
Dept: RHEUMATOLOGY | Age: 73
End: 2023-05-18

## 2023-06-01 RX ORDER — TERAZOSIN 10 MG/1
CAPSULE ORAL
Qty: 90 CAPSULE | Refills: 0 | Status: SHIPPED | OUTPATIENT
Start: 2023-06-01 | End: 2023-09-14

## 2023-06-05 ENCOUNTER — LAB SERVICES (OUTPATIENT)
Dept: LAB | Age: 73
End: 2023-06-05

## 2023-06-05 ENCOUNTER — TELEPHONE (OUTPATIENT)
Dept: NEPHROLOGY | Facility: CLINIC | Age: 73
End: 2023-06-05

## 2023-06-05 ENCOUNTER — TELEPHONE (OUTPATIENT)
Dept: FAMILY MEDICINE | Age: 73
End: 2023-06-05

## 2023-06-05 DIAGNOSIS — E11.22 DIABETES MELLITUS WITH STAGE 3 CHRONIC KIDNEY DISEASE (CMD): ICD-10-CM

## 2023-06-05 DIAGNOSIS — E78.5 HYPERLIPIDEMIA, UNSPECIFIED HYPERLIPIDEMIA TYPE: ICD-10-CM

## 2023-06-05 DIAGNOSIS — I10 BENIGN ESSENTIAL HYPERTENSION: ICD-10-CM

## 2023-06-05 DIAGNOSIS — N18.30 DIABETES MELLITUS WITH STAGE 3 CHRONIC KIDNEY DISEASE (CMD): ICD-10-CM

## 2023-06-05 DIAGNOSIS — M05.79 RHEUMATOID ARTHRITIS INVOLVING MULTIPLE SITES WITH POSITIVE RHEUMATOID FACTOR (CMD): ICD-10-CM

## 2023-06-05 LAB
ALBUMIN SERPL-MCNC: 3.4 G/DL (ref 3.6–5.1)
ALBUMIN/GLOB SERPL: 0.6 {RATIO} (ref 1–2.4)
ALP SERPL-CCNC: 99 UNITS/L (ref 45–117)
ALT SERPL-CCNC: 33 UNITS/L
ANION GAP SERPL CALC-SCNC: 8 MMOL/L (ref 7–19)
AST SERPL-CCNC: 23 UNITS/L
BILIRUB SERPL-MCNC: 0.6 MG/DL (ref 0.2–1)
BUN SERPL-MCNC: 18 MG/DL (ref 6–20)
BUN/CREAT SERPL: 13 (ref 7–25)
CALCIUM SERPL-MCNC: 9 MG/DL (ref 8.4–10.2)
CHLORIDE SERPL-SCNC: 105 MMOL/L (ref 97–110)
CHOLEST SERPL-MCNC: 120 MG/DL
CHOLEST/HDLC SERPL: 2 {RATIO}
CO2 SERPL-SCNC: 30 MMOL/L (ref 21–32)
CREAT SERPL-MCNC: 1.43 MG/DL (ref 0.51–0.95)
CREAT UR-MCNC: 58.2 MG/DL
CRP SERPL-MCNC: 0.4 MG/DL
ERYTHROCYTE [SEDIMENTATION RATE] IN BLOOD BY WESTERGREN METHOD: 42 MM/HR (ref 0–20)
FASTING DURATION TIME PATIENT: 17 HOURS (ref 0–999)
GFR SERPLBLD BASED ON 1.73 SQ M-ARVRAT: 39 ML/MIN
GLOBULIN SER-MCNC: 5.6 G/DL (ref 2–4)
GLUCOSE SERPL-MCNC: 128 MG/DL (ref 70–99)
HBA1C MFR BLD: 9 % (ref 4.5–5.6)
HDLC SERPL-MCNC: 61 MG/DL
LDLC SERPL CALC-MCNC: 45 MG/DL
MICROALBUMIN UR-MCNC: 2.85 MG/DL
MICROALBUMIN/CREAT UR: 49 MG/G
NONHDLC SERPL-MCNC: 59 MG/DL
POTASSIUM SERPL-SCNC: 3.5 MMOL/L (ref 3.4–5.1)
PROT SERPL-MCNC: 9 G/DL (ref 6.4–8.2)
SODIUM SERPL-SCNC: 139 MMOL/L (ref 135–145)
TRIGL SERPL-MCNC: 72 MG/DL

## 2023-06-05 PROCEDURE — 80053 COMPREHEN METABOLIC PANEL: CPT | Performed by: INTERNAL MEDICINE

## 2023-06-05 PROCEDURE — 83036 HEMOGLOBIN GLYCOSYLATED A1C: CPT | Performed by: INTERNAL MEDICINE

## 2023-06-05 PROCEDURE — 80061 LIPID PANEL: CPT | Performed by: INTERNAL MEDICINE

## 2023-06-05 PROCEDURE — 86140 C-REACTIVE PROTEIN: CPT | Performed by: INTERNAL MEDICINE

## 2023-06-05 PROCEDURE — 36415 COLL VENOUS BLD VENIPUNCTURE: CPT | Performed by: INTERNAL MEDICINE

## 2023-06-05 PROCEDURE — 85652 RBC SED RATE AUTOMATED: CPT | Performed by: INTERNAL MEDICINE

## 2023-06-05 NOTE — TELEPHONE ENCOUNTER
Marnie Ndiaye requesting RN to fax labs to [phone]. For additional questions please call. Thank you.

## 2023-06-05 NOTE — TELEPHONE ENCOUNTER
Christopher Fergusson calling again for orders to be sent. Pt has been waiting at [facility] for over an hour. Attempted to reach RN, no answer.  Please fax to [phone]

## 2023-06-05 NOTE — TELEPHONE ENCOUNTER
Spoke to Bruce Evans from [employer]. Received wrong fax number from patient. Re faxed orders to [phone]. Pending confirmation.

## 2023-06-05 NOTE — TELEPHONE ENCOUNTER
Patient will be going [address] today to have her labs completed. Patient requesting for her orders for labs to be faxed to .   *on her way to [facility] lab now

## 2023-06-07 ENCOUNTER — TELEPHONE (OUTPATIENT)
Dept: FAMILY MEDICINE | Age: 73
End: 2023-06-07

## 2023-06-08 ENCOUNTER — OFFICE VISIT (OUTPATIENT)
Dept: INFUSION THERAPY | Age: 73
End: 2023-06-08
Attending: INTERNAL MEDICINE

## 2023-06-08 ENCOUNTER — OFFICE VISIT (OUTPATIENT)
Dept: RHEUMATOLOGY | Age: 73
End: 2023-06-08

## 2023-06-08 VITALS
DIASTOLIC BLOOD PRESSURE: 82 MMHG | RESPIRATION RATE: 16 BRPM | SYSTOLIC BLOOD PRESSURE: 152 MMHG | OXYGEN SATURATION: 95 % | HEART RATE: 69 BPM | WEIGHT: 293 LBS | BODY MASS INDEX: 52.96 KG/M2 | TEMPERATURE: 97.9 F

## 2023-06-08 VITALS
DIASTOLIC BLOOD PRESSURE: 80 MMHG | WEIGHT: 293 LBS | BODY MASS INDEX: 52.96 KG/M2 | TEMPERATURE: 97.9 F | HEART RATE: 79 BPM | SYSTOLIC BLOOD PRESSURE: 165 MMHG

## 2023-06-08 DIAGNOSIS — M05.711 RHEUMATOID ARTHRITIS INVOLVING RIGHT SHOULDER WITH POSITIVE RHEUMATOID FACTOR (CMD): Primary | ICD-10-CM

## 2023-06-08 DIAGNOSIS — M05.711 RHEUMATOID ARTHRITIS INVOLVING RIGHT SHOULDER WITH POSITIVE RHEUMATOID FACTOR (CMD): ICD-10-CM

## 2023-06-08 PROCEDURE — 96415 CHEMO IV INFUSION ADDL HR: CPT

## 2023-06-08 PROCEDURE — 3079F DIAST BP 80-89 MM HG: CPT | Performed by: INTERNAL MEDICINE

## 2023-06-08 PROCEDURE — 10002800 HB RX 250 W HCPCS: Performed by: INTERNAL MEDICINE

## 2023-06-08 PROCEDURE — 96413 CHEMO IV INFUSION 1 HR: CPT

## 2023-06-08 PROCEDURE — 10002807 HB RX 258: Performed by: INTERNAL MEDICINE

## 2023-06-08 PROCEDURE — 99213 OFFICE O/P EST LOW 20 MIN: CPT | Performed by: INTERNAL MEDICINE

## 2023-06-08 PROCEDURE — 3077F SYST BP >= 140 MM HG: CPT | Performed by: INTERNAL MEDICINE

## 2023-06-08 PROCEDURE — 10004651 HB RX, NO CHARGE ITEM: Performed by: INTERNAL MEDICINE

## 2023-06-08 RX ORDER — 0.9 % SODIUM CHLORIDE 0.9 %
2 VIAL (ML) INJECTION PRN
Status: DISCONTINUED | OUTPATIENT
Start: 2023-06-08 | End: 2023-06-08 | Stop reason: HOSPADM

## 2023-06-08 RX ORDER — 0.9 % SODIUM CHLORIDE 0.9 %
2 VIAL (ML) INJECTION PRN
Status: CANCELLED | OUTPATIENT
Start: 2023-06-08

## 2023-06-08 RX ORDER — DIPHENHYDRAMINE HYDROCHLORIDE 50 MG/ML
12.5 INJECTION INTRAMUSCULAR; INTRAVENOUS ONCE
Status: CANCELLED
Start: 2023-06-08 | End: 2023-06-08

## 2023-06-08 RX ORDER — SODIUM CHLORIDE 9 MG/ML
INJECTION, SOLUTION INTRAVENOUS CONTINUOUS PRN
Status: CANCELLED | OUTPATIENT
Start: 2023-06-08

## 2023-06-08 RX ORDER — ACETAMINOPHEN 325 MG/1
650 TABLET ORAL ONCE
Status: CANCELLED | OUTPATIENT
Start: 2023-06-08 | End: 2023-06-08

## 2023-06-08 RX ORDER — FAMOTIDINE 10 MG/ML
20 INJECTION, SOLUTION INTRAVENOUS
Status: CANCELLED | OUTPATIENT
Start: 2023-06-08

## 2023-06-08 RX ORDER — DIPHENHYDRAMINE HYDROCHLORIDE 50 MG/ML
12.5 INJECTION INTRAMUSCULAR; INTRAVENOUS ONCE
Status: DISCONTINUED | OUTPATIENT
Start: 2023-06-08 | End: 2023-06-08 | Stop reason: HOSPADM

## 2023-06-08 RX ORDER — METHYLPREDNISOLONE SODIUM SUCCINATE 125 MG/2ML
125 INJECTION, POWDER, LYOPHILIZED, FOR SOLUTION INTRAMUSCULAR; INTRAVENOUS
Status: CANCELLED | OUTPATIENT
Start: 2023-06-08

## 2023-06-08 RX ORDER — DIPHENHYDRAMINE HCL 25 MG
25 CAPSULE ORAL ONCE
Status: CANCELLED | OUTPATIENT
Start: 2023-06-08 | End: 2023-06-08

## 2023-06-08 RX ORDER — DIPHENHYDRAMINE HYDROCHLORIDE 50 MG/ML
50 INJECTION INTRAMUSCULAR; INTRAVENOUS
Status: CANCELLED | OUTPATIENT
Start: 2023-06-08

## 2023-06-08 RX ADMIN — INFLIXIMAB 700 MG: 100 INJECTION, POWDER, LYOPHILIZED, FOR SOLUTION INTRAVENOUS at 10:52

## 2023-06-08 RX ADMIN — SODIUM CHLORIDE, PRESERVATIVE FREE 2 ML: 5 INJECTION INTRAVENOUS at 12:56

## 2023-06-08 ASSESSMENT — DISEASE ACTIVITY SCORE (DAS28)
TOTAL_SCORE_CRP: 2.62
CRP_MG_PER_LITER: 1.54

## 2023-06-08 ASSESSMENT — PAIN SCALES - GENERAL
PAINLEVEL: 0
PAINLEVEL: 0
PAINLEVEL_OUTOF10: 0

## 2023-06-08 ASSESSMENT — ENCOUNTER SYMPTOMS
DIARRHEA: 0
ALLERGIC/IMMUNOLOGIC NEGATIVE: 1
HEADACHES: 0
FEVER: 0
NAUSEA: 0
COLOR CHANGE: 0
ACTIVITY CHANGE: 0
UNEXPECTED WEIGHT CHANGE: 0
ADENOPATHY: 0
ABDOMINAL PAIN: 0
SORE THROAT: 0
EYE PAIN: 0
SHORTNESS OF BREATH: 1
CONSTIPATION: 0
BRUISES/BLEEDS EASILY: 0
EYE REDNESS: 0
CHEST TIGHTNESS: 0
ENDOCRINE NEGATIVE: 1
PSYCHIATRIC NEGATIVE: 1
FATIGUE: 0
NUMBNESS: 0

## 2023-06-08 ASSESSMENT — ACTIVITIES OF DAILY LIVING (ADL)
ADL_BEFORE_ADMISSION: INDEPENDENT
ADL_SCORE: 12

## 2023-06-08 ASSESSMENT — JOINT PAIN
TOTAL NUMBER OF SWOLLEN JOINTS: 0
TOTAL NUMBER OF TENDER JOINTS: 0

## 2023-06-12 ENCOUNTER — APPOINTMENT (OUTPATIENT)
Dept: CARDIOLOGY | Age: 73
End: 2023-06-12
Attending: FAMILY MEDICINE

## 2023-06-20 DIAGNOSIS — E78.5 DYSLIPIDEMIA: ICD-10-CM

## 2023-06-20 RX ORDER — ATORVASTATIN CALCIUM 40 MG/1
TABLET, FILM COATED ORAL
Qty: 90 TABLET | Refills: 0 | Status: SHIPPED | OUTPATIENT
Start: 2023-06-20 | End: 2023-09-14

## 2023-07-03 DIAGNOSIS — E78.5 DYSLIPIDEMIA: ICD-10-CM

## 2023-07-03 RX ORDER — VALSARTAN AND HYDROCHLOROTHIAZIDE 320; 12.5 MG/1; MG/1
TABLET, FILM COATED ORAL
Qty: 90 TABLET | Refills: 0 | Status: SHIPPED | OUTPATIENT
Start: 2023-07-03 | End: 2023-09-29

## 2023-07-06 ENCOUNTER — TELEPHONE (OUTPATIENT)
Dept: FAMILY MEDICINE | Age: 73
End: 2023-07-06

## 2023-07-06 ENCOUNTER — EXTERNAL RECORD (OUTPATIENT)
Dept: HEALTH INFORMATION MANAGEMENT | Facility: OTHER | Age: 73
End: 2023-07-06

## 2023-07-13 ENCOUNTER — ANCILLARY PROCEDURE (OUTPATIENT)
Dept: CARDIOLOGY | Age: 73
End: 2023-07-13
Attending: FAMILY MEDICINE

## 2023-07-13 DIAGNOSIS — I10 BENIGN ESSENTIAL HYPERTENSION: ICD-10-CM

## 2023-07-13 DIAGNOSIS — E11.22 DIABETES MELLITUS WITH STAGE 3 CHRONIC KIDNEY DISEASE (CMD): ICD-10-CM

## 2023-07-13 DIAGNOSIS — N18.30 DIABETES MELLITUS WITH STAGE 3 CHRONIC KIDNEY DISEASE (CMD): ICD-10-CM

## 2023-07-13 DIAGNOSIS — E78.5 HYPERLIPIDEMIA, UNSPECIFIED HYPERLIPIDEMIA TYPE: ICD-10-CM

## 2023-07-13 PROCEDURE — 93784 AMBL BP MNTR W/SOFTWARE: CPT | Performed by: INTERNAL MEDICINE

## 2023-07-17 ENCOUNTER — OFFICE VISIT (OUTPATIENT)
Dept: FAMILY MEDICINE | Age: 73
End: 2023-07-17

## 2023-07-17 VITALS
SYSTOLIC BLOOD PRESSURE: 116 MMHG | HEIGHT: 68 IN | TEMPERATURE: 97.6 F | OXYGEN SATURATION: 98 % | WEIGHT: 293 LBS | DIASTOLIC BLOOD PRESSURE: 72 MMHG | BODY MASS INDEX: 44.41 KG/M2 | HEART RATE: 74 BPM | RESPIRATION RATE: 24 BRPM

## 2023-07-17 DIAGNOSIS — N18.30 STAGE 3 CHRONIC KIDNEY DISEASE, UNSPECIFIED WHETHER STAGE 3A OR 3B CKD (CMD): ICD-10-CM

## 2023-07-17 DIAGNOSIS — N18.30 DIABETES MELLITUS WITH STAGE 3 CHRONIC KIDNEY DISEASE (CMD): ICD-10-CM

## 2023-07-17 DIAGNOSIS — Z09 FOLLOW-UP EXAM: Primary | ICD-10-CM

## 2023-07-17 DIAGNOSIS — E78.5 HYPERLIPIDEMIA, UNSPECIFIED HYPERLIPIDEMIA TYPE: ICD-10-CM

## 2023-07-17 DIAGNOSIS — S91.102D OPEN WOUND OF LEFT GREAT TOE, SUBSEQUENT ENCOUNTER: ICD-10-CM

## 2023-07-17 DIAGNOSIS — L03.032 CELLULITIS OF TOE OF LEFT FOOT: ICD-10-CM

## 2023-07-17 DIAGNOSIS — E11.22 DIABETES MELLITUS WITH STAGE 3 CHRONIC KIDNEY DISEASE (CMD): ICD-10-CM

## 2023-07-17 DIAGNOSIS — I10 BENIGN ESSENTIAL HYPERTENSION: ICD-10-CM

## 2023-07-17 PROCEDURE — 3074F SYST BP LT 130 MM HG: CPT | Performed by: FAMILY MEDICINE

## 2023-07-17 PROCEDURE — 99215 OFFICE O/P EST HI 40 MIN: CPT | Performed by: FAMILY MEDICINE

## 2023-07-17 PROCEDURE — 3078F DIAST BP <80 MM HG: CPT | Performed by: FAMILY MEDICINE

## 2023-07-17 RX ORDER — OMEPRAZOLE 20 MG/1
20 CAPSULE, DELAYED RELEASE ORAL DAILY
Qty: 90 CAPSULE | Refills: 0 | Status: SHIPPED | OUTPATIENT
Start: 2023-07-17 | End: 2023-11-14

## 2023-07-17 ASSESSMENT — PAIN SCALES - GENERAL: PAINLEVEL: 5

## 2023-07-19 ENCOUNTER — TELEPHONE (OUTPATIENT)
Dept: FAMILY MEDICINE | Age: 73
End: 2023-07-19

## 2023-07-21 ENCOUNTER — HOSPITAL ENCOUNTER (OUTPATIENT)
Dept: WOUND CARE | Age: 73
Discharge: STILL ADMITTED OR ADMINISTRATIVE | End: 2023-07-21
Attending: FAMILY MEDICINE

## 2023-07-21 DIAGNOSIS — E11.621 DIABETIC ULCER OF TOE OF LEFT FOOT ASSOCIATED WITH TYPE 2 DIABETES MELLITUS, WITH FAT LAYER EXPOSED (CMD): Primary | ICD-10-CM

## 2023-07-21 DIAGNOSIS — E11.40 TYPE 2 DIABETES MELLITUS WITH DIABETIC NEUROPATHY, WITHOUT LONG-TERM CURRENT USE OF INSULIN (CMD): ICD-10-CM

## 2023-07-21 DIAGNOSIS — L97.522 DIABETIC ULCER OF TOE OF LEFT FOOT ASSOCIATED WITH TYPE 2 DIABETES MELLITUS, WITH FAT LAYER EXPOSED (CMD): Primary | ICD-10-CM

## 2023-07-21 DIAGNOSIS — L97.529 TOE ULCER, LEFT, WITH UNSPECIFIED SEVERITY (CMD): ICD-10-CM

## 2023-07-21 DIAGNOSIS — E66.01 MORBID OBESITY (CMD): ICD-10-CM

## 2023-07-21 PROCEDURE — 11042 DBRDMT SUBQ TIS 1ST 20SQCM/<: CPT

## 2023-07-21 PROCEDURE — 97597 DBRDMT OPN WND 1ST 20 CM/<: CPT | Performed by: FAMILY MEDICINE

## 2023-07-21 PROCEDURE — 99214 OFFICE O/P EST MOD 30 MIN: CPT | Performed by: FAMILY MEDICINE

## 2023-07-31 ENCOUNTER — APPOINTMENT (OUTPATIENT)
Dept: WOUND CARE | Age: 73
End: 2023-07-31
Attending: SURGERY

## 2023-08-01 RX ORDER — FUROSEMIDE 20 MG/1
20 TABLET ORAL DAILY
Qty: 90 TABLET | Refills: 0 | Status: SHIPPED | OUTPATIENT
Start: 2023-08-01

## 2023-08-01 RX ORDER — FOLIC ACID 1 MG/1
1000 TABLET ORAL DAILY
Qty: 90 TABLET | Refills: 0 | Status: SHIPPED | OUTPATIENT
Start: 2023-08-01 | End: 2023-10-31

## 2023-08-04 ENCOUNTER — HOSPITAL ENCOUNTER (OUTPATIENT)
Dept: GENERAL RADIOLOGY | Age: 73
Discharge: HOME | End: 2023-08-04

## 2023-08-04 ENCOUNTER — HOSPITAL ENCOUNTER (OUTPATIENT)
Dept: WOUND CARE | Age: 73
Discharge: STILL ADMITTED OR ADMINISTRATIVE | End: 2023-08-04
Attending: FAMILY MEDICINE

## 2023-08-04 VITALS
HEART RATE: 72 BPM | OXYGEN SATURATION: 99 % | TEMPERATURE: 96.1 F | RESPIRATION RATE: 15 BRPM | DIASTOLIC BLOOD PRESSURE: 79 MMHG | SYSTOLIC BLOOD PRESSURE: 171 MMHG

## 2023-08-04 DIAGNOSIS — M17.0 PRIMARY OSTEOARTHRITIS OF BOTH KNEES: ICD-10-CM

## 2023-08-04 DIAGNOSIS — S91.102D OPEN WOUND OF LEFT GREAT TOE, SUBSEQUENT ENCOUNTER: ICD-10-CM

## 2023-08-04 DIAGNOSIS — E11.40 TYPE 2 DIABETES MELLITUS WITH DIABETIC NEUROPATHY, WITHOUT LONG-TERM CURRENT USE OF INSULIN (CMD): ICD-10-CM

## 2023-08-04 DIAGNOSIS — E66.01 MORBID OBESITY (CMD): ICD-10-CM

## 2023-08-04 DIAGNOSIS — L97.529 TOE ULCER, LEFT, WITH UNSPECIFIED SEVERITY (CMD): Primary | ICD-10-CM

## 2023-08-04 PROCEDURE — 99213 OFFICE O/P EST LOW 20 MIN: CPT | Performed by: FAMILY MEDICINE

## 2023-08-04 PROCEDURE — 73620 X-RAY EXAM OF FOOT: CPT

## 2023-08-04 PROCEDURE — 3077F SYST BP >= 140 MM HG: CPT | Performed by: FAMILY MEDICINE

## 2023-08-04 PROCEDURE — 99213 OFFICE O/P EST LOW 20 MIN: CPT

## 2023-08-04 PROCEDURE — 3078F DIAST BP <80 MM HG: CPT | Performed by: FAMILY MEDICINE

## 2023-08-04 ASSESSMENT — PAIN SCALES - GENERAL: PAINLEVEL_OUTOF10: 0

## 2023-08-08 DIAGNOSIS — M06.9 RHEUMATOID ARTHRITIS (CMD): ICD-10-CM

## 2023-08-09 ENCOUNTER — OFFICE VISIT (OUTPATIENT)
Dept: INFUSION THERAPY | Age: 73
End: 2023-08-09
Attending: INTERNAL MEDICINE

## 2023-08-09 VITALS
OXYGEN SATURATION: 95 % | DIASTOLIC BLOOD PRESSURE: 84 MMHG | TEMPERATURE: 97.2 F | WEIGHT: 293 LBS | BODY MASS INDEX: 52.49 KG/M2 | SYSTOLIC BLOOD PRESSURE: 136 MMHG | RESPIRATION RATE: 16 BRPM | HEART RATE: 80 BPM

## 2023-08-09 DIAGNOSIS — M05.711 RHEUMATOID ARTHRITIS INVOLVING RIGHT SHOULDER WITH POSITIVE RHEUMATOID FACTOR (CMD): Primary | ICD-10-CM

## 2023-08-09 PROCEDURE — 96413 CHEMO IV INFUSION 1 HR: CPT

## 2023-08-09 PROCEDURE — 10002807 HB RX 258: Performed by: INTERNAL MEDICINE

## 2023-08-09 PROCEDURE — 96375 TX/PRO/DX INJ NEW DRUG ADDON: CPT

## 2023-08-09 PROCEDURE — 10002800 HB RX 250 W HCPCS: Performed by: INTERNAL MEDICINE

## 2023-08-09 PROCEDURE — 96415 CHEMO IV INFUSION ADDL HR: CPT

## 2023-08-09 RX ORDER — DIPHENHYDRAMINE HCL 25 MG
25 CAPSULE ORAL ONCE
OUTPATIENT
Start: 2023-08-09 | End: 2023-08-09

## 2023-08-09 RX ORDER — ACETAMINOPHEN 325 MG/1
650 TABLET ORAL ONCE
Status: DISCONTINUED | OUTPATIENT
Start: 2023-08-09 | End: 2023-08-09 | Stop reason: HOSPADM

## 2023-08-09 RX ORDER — ACETAMINOPHEN 325 MG/1
650 TABLET ORAL ONCE
OUTPATIENT
Start: 2023-08-09 | End: 2023-08-09

## 2023-08-09 RX ORDER — METHYLPREDNISOLONE SODIUM SUCCINATE 125 MG/2ML
125 INJECTION, POWDER, LYOPHILIZED, FOR SOLUTION INTRAMUSCULAR; INTRAVENOUS
OUTPATIENT
Start: 2023-08-09

## 2023-08-09 RX ORDER — 0.9 % SODIUM CHLORIDE 0.9 %
2 VIAL (ML) INJECTION PRN
Status: CANCELLED | OUTPATIENT
Start: 2023-08-09

## 2023-08-09 RX ORDER — DIPHENHYDRAMINE HCL 25 MG
25 CAPSULE ORAL ONCE
Status: DISCONTINUED | OUTPATIENT
Start: 2023-08-09 | End: 2023-08-09 | Stop reason: HOSPADM

## 2023-08-09 RX ORDER — DIPHENHYDRAMINE HYDROCHLORIDE 50 MG/ML
12.5 INJECTION INTRAMUSCULAR; INTRAVENOUS ONCE
Status: COMPLETED | OUTPATIENT
Start: 2023-08-09 | End: 2023-08-09

## 2023-08-09 RX ORDER — FAMOTIDINE 10 MG/ML
20 INJECTION, SOLUTION INTRAVENOUS
OUTPATIENT
Start: 2023-08-09

## 2023-08-09 RX ORDER — DIPHENHYDRAMINE HYDROCHLORIDE 50 MG/ML
12.5 INJECTION INTRAMUSCULAR; INTRAVENOUS ONCE
Status: CANCELLED
Start: 2023-08-09 | End: 2023-08-09

## 2023-08-09 RX ORDER — 0.9 % SODIUM CHLORIDE 0.9 %
2 VIAL (ML) INJECTION PRN
Status: DISCONTINUED | OUTPATIENT
Start: 2023-08-09 | End: 2023-08-09 | Stop reason: HOSPADM

## 2023-08-09 RX ORDER — SODIUM CHLORIDE 9 MG/ML
INJECTION, SOLUTION INTRAVENOUS CONTINUOUS PRN
OUTPATIENT
Start: 2023-08-09

## 2023-08-09 RX ORDER — DIPHENHYDRAMINE HYDROCHLORIDE 50 MG/ML
50 INJECTION INTRAMUSCULAR; INTRAVENOUS
OUTPATIENT
Start: 2023-08-09

## 2023-08-09 RX ADMIN — DIPHENHYDRAMINE HYDROCHLORIDE 12.5 MG: 50 INJECTION, SOLUTION INTRAMUSCULAR; INTRAVENOUS at 10:32

## 2023-08-09 RX ADMIN — INFLIXIMAB 700 MG: 100 INJECTION, POWDER, LYOPHILIZED, FOR SOLUTION INTRAVENOUS at 10:54

## 2023-08-09 RX ADMIN — SODIUM CHLORIDE 250 ML: 9 INJECTION, SOLUTION INTRAVENOUS at 10:16

## 2023-08-09 ASSESSMENT — PAIN SCALES - GENERAL: PAINLEVEL: 0

## 2023-08-10 ENCOUNTER — TELEPHONE (OUTPATIENT)
Dept: FAMILY MEDICINE | Age: 73
End: 2023-08-10

## 2023-08-11 DIAGNOSIS — E08.40 DIABETES MELLITUS DUE TO UNDERLYING CONDITION WITH DIABETIC NEUROPATHY, WITHOUT LONG-TERM CURRENT USE OF INSULIN (CMD): ICD-10-CM

## 2023-08-11 RX ORDER — PEN NEEDLE, DIABETIC 32GX 5/32"
NEEDLE, DISPOSABLE MISCELLANEOUS
Qty: 100 EACH | Refills: 0 | Status: SHIPPED | OUTPATIENT
Start: 2023-08-11 | End: 2023-10-30

## 2023-08-11 RX ORDER — BLOOD SUGAR DIAGNOSTIC
STRIP MISCELLANEOUS
Qty: 100 EACH | Refills: 0 | Status: SHIPPED | OUTPATIENT
Start: 2023-08-11 | End: 2023-10-30

## 2023-08-14 ENCOUNTER — HOSPITAL ENCOUNTER (OUTPATIENT)
Dept: WOUND CARE | Age: 73
Discharge: STILL ADMITTED OR ADMINISTRATIVE | End: 2023-08-14
Attending: FAMILY MEDICINE

## 2023-08-14 VITALS — TEMPERATURE: 96.3 F

## 2023-08-14 DIAGNOSIS — L97.522 DIABETIC ULCER OF TOE OF LEFT FOOT ASSOCIATED WITH TYPE 2 DIABETES MELLITUS, WITH FAT LAYER EXPOSED (CMD): ICD-10-CM

## 2023-08-14 DIAGNOSIS — E11.40 TYPE 2 DIABETES MELLITUS WITH DIABETIC NEUROPATHY, WITHOUT LONG-TERM CURRENT USE OF INSULIN (CMD): ICD-10-CM

## 2023-08-14 DIAGNOSIS — L97.529 TOE ULCER, LEFT, WITH UNSPECIFIED SEVERITY (CMD): ICD-10-CM

## 2023-08-14 DIAGNOSIS — L97.522 TOE ULCER, LEFT, WITH FAT LAYER EXPOSED (CMD): Primary | ICD-10-CM

## 2023-08-14 DIAGNOSIS — E11.621 DIABETIC ULCER OF TOE OF LEFT FOOT ASSOCIATED WITH TYPE 2 DIABETES MELLITUS, WITH FAT LAYER EXPOSED (CMD): ICD-10-CM

## 2023-08-14 PROCEDURE — 11042 DBRDMT SUBQ TIS 1ST 20SQCM/<: CPT

## 2023-08-14 ASSESSMENT — PAIN SCALES - GENERAL: PAINLEVEL_OUTOF10: 0

## 2023-08-21 ENCOUNTER — HOSPITAL ENCOUNTER (OUTPATIENT)
Dept: WOUND CARE | Age: 73
Discharge: STILL ADMITTED OR ADMINISTRATIVE | End: 2023-08-21
Attending: FAMILY MEDICINE

## 2023-08-21 VITALS — TEMPERATURE: 96.3 F

## 2023-08-21 DIAGNOSIS — E11.40 TYPE 2 DIABETES MELLITUS WITH DIABETIC NEUROPATHY, WITHOUT LONG-TERM CURRENT USE OF INSULIN (CMD): ICD-10-CM

## 2023-08-21 DIAGNOSIS — L97.522 TOE ULCER, LEFT, WITH FAT LAYER EXPOSED (CMD): Primary | ICD-10-CM

## 2023-08-21 DIAGNOSIS — E66.01 MORBID OBESITY (CMD): ICD-10-CM

## 2023-08-21 DIAGNOSIS — L97.529 TOE ULCER, LEFT, WITH UNSPECIFIED SEVERITY (CMD): ICD-10-CM

## 2023-08-21 DIAGNOSIS — L97.522 DIABETIC ULCER OF TOE OF LEFT FOOT ASSOCIATED WITH TYPE 2 DIABETES MELLITUS, WITH FAT LAYER EXPOSED (CMD): ICD-10-CM

## 2023-08-21 DIAGNOSIS — E11.621 DIABETIC ULCER OF TOE OF LEFT FOOT ASSOCIATED WITH TYPE 2 DIABETES MELLITUS, WITH FAT LAYER EXPOSED (CMD): ICD-10-CM

## 2023-08-21 PROCEDURE — 11042 DBRDMT SUBQ TIS 1ST 20SQCM/<: CPT

## 2023-08-21 ASSESSMENT — PAIN SCALES - GENERAL: PAINLEVEL_OUTOF10: 0

## 2023-08-28 ENCOUNTER — HOSPITAL ENCOUNTER (OUTPATIENT)
Dept: WOUND CARE | Age: 73
Discharge: STILL ADMITTED OR ADMINISTRATIVE | End: 2023-08-28
Attending: FAMILY MEDICINE

## 2023-08-28 VITALS — TEMPERATURE: 96.8 F

## 2023-08-28 DIAGNOSIS — E11.621 DIABETIC ULCER OF TOE OF LEFT FOOT ASSOCIATED WITH TYPE 2 DIABETES MELLITUS, WITH FAT LAYER EXPOSED (CMD): ICD-10-CM

## 2023-08-28 DIAGNOSIS — L97.522 TOE ULCER, LEFT, WITH FAT LAYER EXPOSED (CMD): Primary | ICD-10-CM

## 2023-08-28 DIAGNOSIS — L97.529 TOE ULCER, LEFT, WITH UNSPECIFIED SEVERITY (CMD): ICD-10-CM

## 2023-08-28 DIAGNOSIS — L97.522 DIABETIC ULCER OF TOE OF LEFT FOOT ASSOCIATED WITH TYPE 2 DIABETES MELLITUS, WITH FAT LAYER EXPOSED (CMD): ICD-10-CM

## 2023-08-28 DIAGNOSIS — E11.40 TYPE 2 DIABETES MELLITUS WITH DIABETIC NEUROPATHY, WITHOUT LONG-TERM CURRENT USE OF INSULIN (CMD): ICD-10-CM

## 2023-08-28 PROCEDURE — 11042 DBRDMT SUBQ TIS 1ST 20SQCM/<: CPT

## 2023-08-28 ASSESSMENT — PAIN SCALES - GENERAL: PAINLEVEL_OUTOF10: 0

## 2023-09-11 ENCOUNTER — LAB ENCOUNTER (OUTPATIENT)
Dept: LAB | Facility: HOSPITAL | Age: 73
End: 2023-09-11
Attending: INTERNAL MEDICINE
Payer: MEDICARE

## 2023-09-11 ENCOUNTER — HOSPITAL ENCOUNTER (OUTPATIENT)
Dept: WOUND CARE | Age: 73
Discharge: STILL ADMITTED OR ADMINISTRATIVE | End: 2023-09-11
Attending: FAMILY MEDICINE

## 2023-09-11 ENCOUNTER — TELEPHONE (OUTPATIENT)
Dept: NEPHROLOGY | Facility: CLINIC | Age: 73
End: 2023-09-11

## 2023-09-11 VITALS — TEMPERATURE: 96.3 F

## 2023-09-11 DIAGNOSIS — D47.2 MGUS (MONOCLONAL GAMMOPATHY OF UNKNOWN SIGNIFICANCE): ICD-10-CM

## 2023-09-11 DIAGNOSIS — E11.40 TYPE 2 DIABETES MELLITUS WITH DIABETIC NEUROPATHY, WITHOUT LONG-TERM CURRENT USE OF INSULIN (CMD): ICD-10-CM

## 2023-09-11 DIAGNOSIS — E78.2 MIXED HYPERLIPIDEMIA: ICD-10-CM

## 2023-09-11 DIAGNOSIS — L97.529 TOE ULCER, LEFT, WITH UNSPECIFIED SEVERITY (CMD): ICD-10-CM

## 2023-09-11 DIAGNOSIS — N18.31 STAGE 3A CHRONIC KIDNEY DISEASE (HCC): ICD-10-CM

## 2023-09-11 DIAGNOSIS — L97.522 DIABETIC ULCER OF TOE OF LEFT FOOT ASSOCIATED WITH TYPE 2 DIABETES MELLITUS, WITH FAT LAYER EXPOSED (CMD): ICD-10-CM

## 2023-09-11 DIAGNOSIS — E66.01 MORBID OBESITY (CMD): ICD-10-CM

## 2023-09-11 DIAGNOSIS — E11.49 TYPE 2 DIABETES MELLITUS WITH OTHER DIABETIC NEUROLOGICAL COMPLICATION (HCC): Primary | ICD-10-CM

## 2023-09-11 DIAGNOSIS — E11.621 DIABETIC ULCER OF TOE OF LEFT FOOT ASSOCIATED WITH TYPE 2 DIABETES MELLITUS, WITH FAT LAYER EXPOSED (CMD): ICD-10-CM

## 2023-09-11 DIAGNOSIS — L97.522 TOE ULCER, LEFT, WITH FAT LAYER EXPOSED (CMD): Primary | ICD-10-CM

## 2023-09-11 LAB
ALBUMIN SERPL-MCNC: 3.3 G/DL (ref 3.4–5)
ALBUMIN/GLOB SERPL: 0.6 {RATIO} (ref 1–2)
ALP LIVER SERPL-CCNC: 86 U/L
ALT SERPL-CCNC: 28 U/L
ANION GAP SERPL CALC-SCNC: 5 MMOL/L (ref 0–18)
AST SERPL-CCNC: 18 U/L (ref 15–37)
BASOPHILS # BLD AUTO: 0.05 X10(3) UL (ref 0–0.2)
BASOPHILS NFR BLD AUTO: 0.7 %
BILIRUB SERPL-MCNC: 0.7 MG/DL (ref 0.1–2)
BUN BLD-MCNC: 20 MG/DL (ref 7–18)
BUN/CREAT SERPL: 13.1 (ref 10–20)
CALCIUM BLD-MCNC: 8.8 MG/DL (ref 8.5–10.1)
CHLORIDE SERPL-SCNC: 106 MMOL/L (ref 98–112)
CHOLEST SERPL-MCNC: 112 MG/DL (ref ?–200)
CO2 SERPL-SCNC: 28 MMOL/L (ref 21–32)
CREAT BLD-MCNC: 1.53 MG/DL
CREAT UR-SCNC: 410 MG/DL
DEPRECATED RDW RBC AUTO: 48.5 FL (ref 35.1–46.3)
EGFRCR SERPLBLD CKD-EPI 2021: 36 ML/MIN/1.73M2 (ref 60–?)
EOSINOPHIL # BLD AUTO: 0.29 X10(3) UL (ref 0–0.7)
EOSINOPHIL NFR BLD AUTO: 4.1 %
ERYTHROCYTE [DISTWIDTH] IN BLOOD BY AUTOMATED COUNT: 14 % (ref 11–15)
EST. AVERAGE GLUCOSE BLD GHB EST-MCNC: 194 MG/DL (ref 68–126)
FASTING PATIENT LIPID ANSWER: YES
FASTING STATUS PATIENT QL REPORTED: YES
GLOBULIN PLAS-MCNC: 5.6 G/DL (ref 2.8–4.4)
GLUCOSE BLD-MCNC: 171 MG/DL (ref 70–99)
HBA1C MFR BLD: 8.4 % (ref ?–5.7)
HCT VFR BLD AUTO: 44 %
HDLC SERPL-MCNC: 54 MG/DL (ref 40–59)
HGB BLD-MCNC: 14.2 G/DL
IGA SERPL-MCNC: 415 MG/DL (ref 70–312)
IGM SERPL-MCNC: 314 MG/DL (ref 43–279)
IMM GRANULOCYTES # BLD AUTO: 0.02 X10(3) UL (ref 0–1)
IMM GRANULOCYTES NFR BLD: 0.3 %
IMMUNOGLOBULIN PNL SER-MCNC: 2950 MG/DL (ref 791–1643)
LDLC SERPL CALC-MCNC: 42 MG/DL (ref ?–100)
LYMPHOCYTES # BLD AUTO: 3.37 X10(3) UL (ref 1–4)
LYMPHOCYTES NFR BLD AUTO: 47.1 %
MCH RBC QN AUTO: 30.7 PG (ref 26–34)
MCHC RBC AUTO-ENTMCNC: 32.3 G/DL (ref 31–37)
MCV RBC AUTO: 95 FL
MICROALBUMIN UR-MCNC: 31.8 MG/DL
MICROALBUMIN/CREAT 24H UR-RTO: 77.6 UG/MG (ref ?–30)
MONOCYTES # BLD AUTO: 0.62 X10(3) UL (ref 0.1–1)
MONOCYTES NFR BLD AUTO: 8.7 %
NEUTROPHILS # BLD AUTO: 2.8 X10 (3) UL (ref 1.5–7.7)
NEUTROPHILS # BLD AUTO: 2.8 X10(3) UL (ref 1.5–7.7)
NEUTROPHILS NFR BLD AUTO: 39.1 %
NONHDLC SERPL-MCNC: 58 MG/DL (ref ?–130)
OSMOLALITY SERPL CALC.SUM OF ELEC: 295 MOSM/KG (ref 275–295)
PHOSPHATE SERPL-MCNC: 2.8 MG/DL (ref 2.5–4.9)
PLATELET # BLD AUTO: 200 10(3)UL (ref 150–450)
POTASSIUM SERPL-SCNC: 3.9 MMOL/L (ref 3.5–5.1)
PROT SERPL-MCNC: 8.9 G/DL (ref 6.4–8.2)
RBC # BLD AUTO: 4.63 X10(6)UL
SODIUM SERPL-SCNC: 139 MMOL/L (ref 136–145)
TRIGL SERPL-MCNC: 79 MG/DL (ref 30–149)
VLDLC SERPL CALC-MCNC: 11 MG/DL (ref 0–30)
WBC # BLD AUTO: 7.2 X10(3) UL (ref 4–11)

## 2023-09-11 PROCEDURE — 84165 PROTEIN E-PHORESIS SERUM: CPT

## 2023-09-11 PROCEDURE — 80061 LIPID PANEL: CPT

## 2023-09-11 PROCEDURE — 85025 COMPLETE CBC W/AUTO DIFF WBC: CPT

## 2023-09-11 PROCEDURE — 82784 ASSAY IGA/IGD/IGG/IGM EACH: CPT

## 2023-09-11 PROCEDURE — 3052F HG A1C>EQUAL 8.0%<EQUAL 9.0%: CPT | Performed by: INTERNAL MEDICINE

## 2023-09-11 PROCEDURE — 86334 IMMUNOFIX E-PHORESIS SERUM: CPT

## 2023-09-11 PROCEDURE — 36415 COLL VENOUS BLD VENIPUNCTURE: CPT

## 2023-09-11 PROCEDURE — 11042 DBRDMT SUBQ TIS 1ST 20SQCM/<: CPT

## 2023-09-11 PROCEDURE — 82570 ASSAY OF URINE CREATININE: CPT

## 2023-09-11 PROCEDURE — 83521 IG LIGHT CHAINS FREE EACH: CPT

## 2023-09-11 PROCEDURE — 82043 UR ALBUMIN QUANTITATIVE: CPT

## 2023-09-11 PROCEDURE — 83036 HEMOGLOBIN GLYCOSYLATED A1C: CPT

## 2023-09-11 PROCEDURE — 3060F POS MICROALBUMINURIA REV: CPT | Performed by: INTERNAL MEDICINE

## 2023-09-11 PROCEDURE — 80053 COMPREHEN METABOLIC PANEL: CPT

## 2023-09-11 PROCEDURE — 84100 ASSAY OF PHOSPHORUS: CPT

## 2023-09-11 ASSESSMENT — PAIN SCALES - GENERAL: PAINLEVEL_OUTOF10: 0

## 2023-09-12 ENCOUNTER — E-ADVICE (OUTPATIENT)
Dept: OTHER | Age: 73
End: 2023-09-12

## 2023-09-12 LAB
ALBUMIN SERPL ELPH-MCNC: 3.77 G/DL (ref 3.75–5.21)
ALBUMIN/GLOB SERPL: 0.8 {RATIO} (ref 1–2)
ALPHA1 GLOB SERPL ELPH-MCNC: 0.27 G/DL (ref 0.19–0.46)
ALPHA2 GLOB SERPL ELPH-MCNC: 0.69 G/DL (ref 0.48–1.05)
B-GLOBULIN SERPL ELPH-MCNC: 1.42 G/DL (ref 0.68–1.23)
GAMMA GLOB SERPL ELPH-MCNC: 2.35 G/DL (ref 0.62–1.7)
KAPPA LC FREE SER-MCNC: 11.93 MG/DL (ref 0.33–1.94)
KAPPA LC FREE/LAMBDA FREE SER NEPH: 1.69 {RATIO} (ref 0.26–1.65)
LAMBDA LC FREE SERPL-MCNC: 7.06 MG/DL (ref 0.57–2.63)
M PROTEIN 1 SERPL ELPH-MCNC: 0.23 G/DL (ref ?–0)
PROT SERPL-MCNC: 8.5 G/DL (ref 6.4–8.2)

## 2023-09-13 ENCOUNTER — EXTERNAL RECORD (OUTPATIENT)
Dept: HEALTH INFORMATION MANAGEMENT | Facility: OTHER | Age: 73
End: 2023-09-13

## 2023-09-14 DIAGNOSIS — E78.5 DYSLIPIDEMIA: ICD-10-CM

## 2023-09-14 RX ORDER — ATORVASTATIN CALCIUM 40 MG/1
TABLET, FILM COATED ORAL
Qty: 90 TABLET | Refills: 0 | Status: SHIPPED | OUTPATIENT
Start: 2023-09-14 | End: 2023-09-21

## 2023-09-14 RX ORDER — TERAZOSIN 10 MG/1
CAPSULE ORAL
Qty: 90 CAPSULE | Refills: 0 | Status: SHIPPED | OUTPATIENT
Start: 2023-09-14

## 2023-09-21 DIAGNOSIS — E78.5 DYSLIPIDEMIA: ICD-10-CM

## 2023-09-21 RX ORDER — ATORVASTATIN CALCIUM 40 MG/1
TABLET, FILM COATED ORAL
Qty: 90 TABLET | Refills: 0 | Status: SHIPPED | OUTPATIENT
Start: 2023-09-21

## 2023-09-25 ENCOUNTER — HOSPITAL ENCOUNTER (OUTPATIENT)
Dept: WOUND CARE | Age: 73
Discharge: STILL ADMITTED OR ADMINISTRATIVE | End: 2023-09-25
Attending: FAMILY MEDICINE

## 2023-09-25 VITALS — TEMPERATURE: 96.1 F

## 2023-09-25 DIAGNOSIS — E66.01 MORBID OBESITY (CMD): ICD-10-CM

## 2023-09-25 DIAGNOSIS — L97.529 TOE ULCER, LEFT, WITH UNSPECIFIED SEVERITY (CMD): Primary | ICD-10-CM

## 2023-09-25 DIAGNOSIS — L97.522 TOE ULCER, LEFT, WITH FAT LAYER EXPOSED (CMD): ICD-10-CM

## 2023-09-25 DIAGNOSIS — E11.40 TYPE 2 DIABETES MELLITUS WITH DIABETIC NEUROPATHY, WITHOUT LONG-TERM CURRENT USE OF INSULIN (CMD): ICD-10-CM

## 2023-09-25 PROCEDURE — 11042 DBRDMT SUBQ TIS 1ST 20SQCM/<: CPT

## 2023-09-25 ASSESSMENT — PAIN SCALES - GENERAL: PAINLEVEL_OUTOF10: 0

## 2023-09-28 DIAGNOSIS — E78.5 DYSLIPIDEMIA: ICD-10-CM

## 2023-09-29 DIAGNOSIS — E78.5 DYSLIPIDEMIA: ICD-10-CM

## 2023-09-29 RX ORDER — VALSARTAN AND HYDROCHLOROTHIAZIDE 320; 12.5 MG/1; MG/1
TABLET, FILM COATED ORAL
Qty: 90 TABLET | Refills: 0 | Status: SHIPPED | OUTPATIENT
Start: 2023-09-29

## 2023-09-29 RX ORDER — VALSARTAN AND HYDROCHLOROTHIAZIDE 320; 12.5 MG/1; MG/1
TABLET, FILM COATED ORAL
Qty: 90 TABLET | Refills: 0 | OUTPATIENT
Start: 2023-09-29

## 2023-10-04 ENCOUNTER — OFFICE VISIT (OUTPATIENT)
Dept: NEPHROLOGY | Facility: CLINIC | Age: 73
End: 2023-10-04

## 2023-10-04 ENCOUNTER — EXTERNAL RECORD (OUTPATIENT)
Dept: HEALTH INFORMATION MANAGEMENT | Facility: OTHER | Age: 73
End: 2023-10-04

## 2023-10-04 ENCOUNTER — OFFICE VISIT (OUTPATIENT)
Dept: INFUSION THERAPY | Age: 73
End: 2023-10-04
Attending: INTERNAL MEDICINE

## 2023-10-04 VITALS
OXYGEN SATURATION: 97 % | HEART RATE: 79 BPM | SYSTOLIC BLOOD PRESSURE: 155 MMHG | WEIGHT: 293 LBS | TEMPERATURE: 98.6 F | BODY MASS INDEX: 51.99 KG/M2 | DIASTOLIC BLOOD PRESSURE: 77 MMHG | RESPIRATION RATE: 16 BRPM

## 2023-10-04 VITALS
HEIGHT: 68 IN | BODY MASS INDEX: 44.41 KG/M2 | WEIGHT: 293 LBS | HEART RATE: 82 BPM | SYSTOLIC BLOOD PRESSURE: 123 MMHG | DIASTOLIC BLOOD PRESSURE: 80 MMHG

## 2023-10-04 DIAGNOSIS — M05.711 RHEUMATOID ARTHRITIS INVOLVING RIGHT SHOULDER WITH POSITIVE RHEUMATOID FACTOR (CMD): Primary | ICD-10-CM

## 2023-10-04 DIAGNOSIS — N18.32 STAGE 3B CHRONIC KIDNEY DISEASE (HCC): Primary | ICD-10-CM

## 2023-10-04 PROCEDURE — 96374 THER/PROPH/DIAG INJ IV PUSH: CPT

## 2023-10-04 PROCEDURE — 10002807 HB RX 258: Performed by: INTERNAL MEDICINE

## 2023-10-04 PROCEDURE — 10002800 HB RX 250 W HCPCS: Performed by: INTERNAL MEDICINE

## 2023-10-04 PROCEDURE — 99214 OFFICE O/P EST MOD 30 MIN: CPT | Performed by: INTERNAL MEDICINE

## 2023-10-04 PROCEDURE — 3074F SYST BP LT 130 MM HG: CPT | Performed by: INTERNAL MEDICINE

## 2023-10-04 PROCEDURE — 96415 CHEMO IV INFUSION ADDL HR: CPT

## 2023-10-04 PROCEDURE — 96413 CHEMO IV INFUSION 1 HR: CPT

## 2023-10-04 PROCEDURE — 1159F MED LIST DOCD IN RCRD: CPT | Performed by: INTERNAL MEDICINE

## 2023-10-04 PROCEDURE — 3008F BODY MASS INDEX DOCD: CPT | Performed by: INTERNAL MEDICINE

## 2023-10-04 PROCEDURE — 96375 TX/PRO/DX INJ NEW DRUG ADDON: CPT

## 2023-10-04 PROCEDURE — 3079F DIAST BP 80-89 MM HG: CPT | Performed by: INTERNAL MEDICINE

## 2023-10-04 RX ORDER — ASPIRIN 81 MG/1
81 TABLET ORAL DAILY
COMMUNITY

## 2023-10-04 RX ORDER — FAMOTIDINE 10 MG/ML
20 INJECTION, SOLUTION INTRAVENOUS
OUTPATIENT
Start: 2023-10-04

## 2023-10-04 RX ORDER — DIPHENHYDRAMINE HYDROCHLORIDE 50 MG/ML
50 INJECTION INTRAMUSCULAR; INTRAVENOUS
OUTPATIENT
Start: 2023-10-04

## 2023-10-04 RX ORDER — DIPHENHYDRAMINE HYDROCHLORIDE 50 MG/ML
12.5 INJECTION INTRAMUSCULAR; INTRAVENOUS ONCE
Start: 2023-10-04 | End: 2023-10-04

## 2023-10-04 RX ORDER — SODIUM CHLORIDE 9 MG/ML
INJECTION, SOLUTION INTRAVENOUS CONTINUOUS PRN
OUTPATIENT
Start: 2023-10-04

## 2023-10-04 RX ORDER — DIPHENHYDRAMINE HYDROCHLORIDE 50 MG/ML
12.5 INJECTION INTRAMUSCULAR; INTRAVENOUS ONCE
Status: COMPLETED | OUTPATIENT
Start: 2023-10-04 | End: 2023-10-04

## 2023-10-04 RX ORDER — MAGNESIUM OXIDE 100 %
POWDER (GRAM) MISCELLANEOUS DAILY
COMMUNITY

## 2023-10-04 RX ORDER — ACETAMINOPHEN 325 MG/1
650 TABLET ORAL ONCE
OUTPATIENT
Start: 2023-10-04 | End: 2023-10-04

## 2023-10-04 RX ORDER — DIPHENHYDRAMINE HCL 25 MG
25 CAPSULE ORAL ONCE
OUTPATIENT
Start: 2023-10-04 | End: 2023-10-04

## 2023-10-04 RX ORDER — 0.9 % SODIUM CHLORIDE 0.9 %
2 VIAL (ML) INJECTION PRN
OUTPATIENT
Start: 2023-10-04

## 2023-10-04 RX ORDER — METHYLPREDNISOLONE SODIUM SUCCINATE 125 MG/2ML
125 INJECTION, POWDER, LYOPHILIZED, FOR SOLUTION INTRAMUSCULAR; INTRAVENOUS
OUTPATIENT
Start: 2023-10-04

## 2023-10-04 RX ORDER — 0.9 % SODIUM CHLORIDE 0.9 %
2 VIAL (ML) INJECTION PRN
Status: DISCONTINUED | OUTPATIENT
Start: 2023-10-04 | End: 2023-10-05 | Stop reason: HOSPADM

## 2023-10-04 RX ADMIN — SODIUM CHLORIDE 250 ML: 9 INJECTION, SOLUTION INTRAVENOUS at 10:29

## 2023-10-04 RX ADMIN — DIPHENHYDRAMINE HYDROCHLORIDE 12.5 MG: 50 INJECTION, SOLUTION INTRAMUSCULAR; INTRAVENOUS at 10:22

## 2023-10-04 RX ADMIN — INFLIXIMAB 700 MG: 100 INJECTION, POWDER, LYOPHILIZED, FOR SOLUTION INTRAVENOUS at 10:53

## 2023-10-04 ASSESSMENT — ACTIVITIES OF DAILY LIVING (ADL)
ADL_SCORE: 12
ADL_BEFORE_ADMISSION: INDEPENDENT

## 2023-10-04 ASSESSMENT — PAIN SCALES - GENERAL: PAINLEVEL_OUTOF10: 0

## 2023-10-04 NOTE — PATIENT INSTRUCTIONS
Repeat your kidney blood test in December 2023. Orders are in the computer. Continue to work on getting your A1c under better control.

## 2023-10-06 ASSESSMENT — PATIENT HEALTH QUESTIONNAIRE - PHQ9
CLINICAL INTERPRETATION OF PHQ2 SCORE: 0
1. LITTLE INTEREST OR PLEASURE IN DOING THINGS: NOT AT ALL
2. FEELING DOWN, DEPRESSED OR HOPELESS: NOT AT ALL
SUM OF ALL RESPONSES TO PHQ9 QUESTIONS 1 AND 2: 0
CLINICAL INTERPRETATION OF PHQ2 SCORE: NO FURTHER SCREENING NEEDED

## 2023-10-11 ENCOUNTER — OFFICE VISIT (OUTPATIENT)
Dept: FAMILY MEDICINE | Age: 73
End: 2023-10-11

## 2023-10-11 VITALS
BODY MASS INDEX: 44.41 KG/M2 | OXYGEN SATURATION: 97 % | HEART RATE: 80 BPM | HEIGHT: 68 IN | RESPIRATION RATE: 18 BRPM | SYSTOLIC BLOOD PRESSURE: 138 MMHG | DIASTOLIC BLOOD PRESSURE: 88 MMHG | TEMPERATURE: 97.3 F | WEIGHT: 293 LBS

## 2023-10-11 DIAGNOSIS — E11.22 DIABETES MELLITUS WITH STAGE 3 CHRONIC KIDNEY DISEASE (CMD): ICD-10-CM

## 2023-10-11 DIAGNOSIS — Z12.11 COLON CANCER SCREENING: ICD-10-CM

## 2023-10-11 DIAGNOSIS — Z23 NEED FOR IMMUNIZATION AGAINST INFLUENZA: ICD-10-CM

## 2023-10-11 DIAGNOSIS — E78.5 HYPERLIPIDEMIA, UNSPECIFIED HYPERLIPIDEMIA TYPE: ICD-10-CM

## 2023-10-11 DIAGNOSIS — N18.30 DIABETES MELLITUS WITH STAGE 3 CHRONIC KIDNEY DISEASE (CMD): ICD-10-CM

## 2023-10-11 DIAGNOSIS — G47.33 OSA ON CPAP: ICD-10-CM

## 2023-10-11 DIAGNOSIS — M05.711 RHEUMATOID ARTHRITIS INVOLVING RIGHT SHOULDER WITH POSITIVE RHEUMATOID FACTOR (CMD): ICD-10-CM

## 2023-10-11 DIAGNOSIS — D47.2 MGUS (MONOCLONAL GAMMOPATHY OF UNKNOWN SIGNIFICANCE): ICD-10-CM

## 2023-10-11 DIAGNOSIS — Z12.31 ENCOUNTER FOR SCREENING MAMMOGRAM FOR MALIGNANT NEOPLASM OF BREAST: ICD-10-CM

## 2023-10-11 DIAGNOSIS — I10 BENIGN ESSENTIAL HYPERTENSION: ICD-10-CM

## 2023-10-11 DIAGNOSIS — Z00.00 MEDICARE ANNUAL WELLNESS VISIT, SUBSEQUENT: Primary | ICD-10-CM

## 2023-10-11 PROCEDURE — 3079F DIAST BP 80-89 MM HG: CPT | Performed by: FAMILY MEDICINE

## 2023-10-11 PROCEDURE — 90662 IIV NO PRSV INCREASED AG IM: CPT | Performed by: FAMILY MEDICINE

## 2023-10-11 PROCEDURE — 99214 OFFICE O/P EST MOD 30 MIN: CPT | Performed by: FAMILY MEDICINE

## 2023-10-11 PROCEDURE — G0439 PPPS, SUBSEQ VISIT: HCPCS | Performed by: FAMILY MEDICINE

## 2023-10-11 PROCEDURE — G0008 ADMIN INFLUENZA VIRUS VAC: HCPCS | Performed by: FAMILY MEDICINE

## 2023-10-11 PROCEDURE — 3075F SYST BP GE 130 - 139MM HG: CPT | Performed by: FAMILY MEDICINE

## 2023-10-11 ASSESSMENT — PAIN SCALES - PAIN ENJOYMENT GENERAL ACTIVITY SCALE (PEG)
INTERFERED_GENERAL_ACTIVITY: 0 (DOES NOT INTERFERE)
INTERFERED_ENJOYMENT_LIFE: 0 (DOES NOT INTERFERE)
PEG_TOTALSCORE: 0
AVG_PAIN_PASTWEEK: 0 (NO PAIN)

## 2023-10-11 ASSESSMENT — PAIN SCALES - GENERAL: PAINLEVEL: 0

## 2023-10-11 ASSESSMENT — ACTIVITIES OF DAILY LIVING (ADL)
ADL_SCORE: 12
MANAGING FINANCES: INDEPENDENT
STIL DRIVING: YES
USING TRANSPORTATION: INDEPENDENT
PILL BOX USED: YES
TAKING MEDICATION: INDEPENDENT
USING TELEPHONE: INDEPENDENT
PREPARING MEALS: INDEPENDENT
NEEDS ASSISTANCE WITH FOOD: INDEPENDENT
GROCERY SHOPPING: INDEPENDENT
ADL_SHORT_OF_BREATH: NO
RECENT_DECLINE_ADL: NO
ADL_BEFORE_ADMISSION: INDEPENDENT
RECENT_DECLINE_ADL: NO
EATING: INDEPENDENT
SENSORY_SUPPORT_DEVICES: EYEGLASSES
NEEDS_ASSIST: NO
ADL_SHORT_OF_BREATH: NO
DOING HOUSEWORK: INDEPENDENT
ADL_BEFORE_ADMISSION: INDEPENDENT
ADL_SCORE: 12

## 2023-10-11 ASSESSMENT — COGNITIVE AND FUNCTIONAL STATUS - GENERAL
BECAUSE OF A PHYSICAL, MENTAL, OR EMOTIONAL CONDITION, DO YOU HAVE SERIOUS DIFFICULTY CONCENTRATING, REMEMBERING OR MAKING DECISIONS: NO
BECAUSE OF A PHYSICAL, MENTAL, OR EMOTIONAL CONDITION, DO YOU HAVE DIFFICULTY DOING ERRANDS ALONE: NO
ARE YOU DEAF OR DO YOU HAVE SERIOUS DIFFICULTY  HEARING: NO
DO YOU HAVE DIFFICULTY DRESSING OR BATHING: NO
ARE YOU BLIND OR DO YOU HAVE SERIOUS DIFFICULTY SEEING, EVEN WHEN WEARING GLASSES: NO
DO YOU HAVE SERIOUS DIFFICULTY WALKING OR CLIMBING STAIRS: NO

## 2023-10-11 ASSESSMENT — PATIENT HEALTH QUESTIONNAIRE - PHQ9
SUM OF ALL RESPONSES TO PHQ9 QUESTIONS 1 AND 2: 0
CLINICAL INTERPRETATION OF PHQ2 SCORE: NO FURTHER SCREENING NEEDED
SUM OF ALL RESPONSES TO PHQ9 QUESTIONS 1 AND 2: 0
2. FEELING DOWN, DEPRESSED OR HOPELESS: NOT AT ALL
1. LITTLE INTEREST OR PLEASURE IN DOING THINGS: NOT AT ALL

## 2023-10-11 ASSESSMENT — MINI COG
PATIENT ABLE TO FILL IN THE CLOCK FACE WITH 10 MINUTES PAST 11 O'CLOCK?: YES, CLOCK IS CORRECT
PATIENT WAS GIVEN REPEAT BACK WORDS FROM VERSION: 1 - BANANA SUNRISE CHAIR
TOTAL SCORE: 5
PATIENT ABLE TO REPEAT THE 3 WORDS GIVEN PREVIOUSLY?: WAS ABLE TO REPEAT BACK 3 WORDS CORRECTLY

## 2023-10-26 ENCOUNTER — EXTERNAL RECORD (OUTPATIENT)
Dept: HEALTH INFORMATION MANAGEMENT | Facility: OTHER | Age: 73
End: 2023-10-26

## 2023-10-26 ENCOUNTER — OFFICE VISIT (OUTPATIENT)
Dept: HEMATOLOGY/ONCOLOGY | Facility: HOSPITAL | Age: 73
End: 2023-10-26
Attending: STUDENT IN AN ORGANIZED HEALTH CARE EDUCATION/TRAINING PROGRAM

## 2023-10-26 VITALS
HEART RATE: 85 BPM | TEMPERATURE: 98 F | OXYGEN SATURATION: 95 % | WEIGHT: 293 LBS | SYSTOLIC BLOOD PRESSURE: 145 MMHG | HEIGHT: 68 IN | RESPIRATION RATE: 22 BRPM | BODY MASS INDEX: 44.41 KG/M2 | DIASTOLIC BLOOD PRESSURE: 79 MMHG

## 2023-10-26 DIAGNOSIS — D47.2 MGUS (MONOCLONAL GAMMOPATHY OF UNKNOWN SIGNIFICANCE): Primary | ICD-10-CM

## 2023-10-26 PROCEDURE — 99213 OFFICE O/P EST LOW 20 MIN: CPT | Performed by: STUDENT IN AN ORGANIZED HEALTH CARE EDUCATION/TRAINING PROGRAM

## 2023-10-27 DIAGNOSIS — E08.40 DIABETES MELLITUS DUE TO UNDERLYING CONDITION WITH DIABETIC NEUROPATHY, WITHOUT LONG-TERM CURRENT USE OF INSULIN (CMD): ICD-10-CM

## 2023-10-30 RX ORDER — PEN NEEDLE, DIABETIC 32GX 5/32"
NEEDLE, DISPOSABLE MISCELLANEOUS
Qty: 100 EACH | Refills: 0 | Status: SHIPPED | OUTPATIENT
Start: 2023-10-30

## 2023-10-30 RX ORDER — BLOOD SUGAR DIAGNOSTIC
STRIP MISCELLANEOUS
Qty: 100 EACH | Refills: 0 | Status: SHIPPED | OUTPATIENT
Start: 2023-10-30

## 2023-10-31 DIAGNOSIS — M06.9 RHEUMATOID ARTHRITIS (CMD): ICD-10-CM

## 2023-10-31 RX ORDER — FOLIC ACID 1 MG/1
1000 TABLET ORAL DAILY
Qty: 90 TABLET | Refills: 0 | Status: SHIPPED | OUTPATIENT
Start: 2023-10-31

## 2023-11-01 ENCOUNTER — E-ADVICE (OUTPATIENT)
Dept: FAMILY MEDICINE | Age: 73
End: 2023-11-01

## 2023-11-10 NOTE — PROGRESS NOTES
10/04/23        Patient: Viola Hudson   YOB: 1950   Date of Visit: 10/4/2023       Dear  Dr. Jhoana Warner MD,      Thank you for referring Viola Hudson to my practice. Please find my assessment and plan below. As you know she is a 72-year-old female with a history of adult onset diabetes mellitus followed by Dr. Shawn Dunaway, rheumatoid arthritis, anemia of chronic disease, obesity, MGUS followed by hematology, hypercholesterolemia, sleep apnea and hypertension who I now had the pleasure of seeing for follow-up of chronic kidney disease stage III. Last seen in October 2022. He has not been compliant with follow-up labs. She reports though that she injured her foot in June 2023. Had a chronic foot ulcer which slowly resolved. Was just discharged from the wound clinic. As result she had a stop water aerobics. Having significant problems with peripheral neuropathy. Rheumatoid arthritis under good control with Remicade. On physical exam her blood pressure 123/80 with a pulse of 82 and she weighed 343 pounds. Her neck was supple without JVD. Lungs were clear. Heart revealed a regular rate and rhythm with an S4 but no gallops, murmurs or rubs. Abdomen was soft, flat, nontender without organomegaly, masses or bruits. Extremities revealed no edema. I reviewed her recent laboratory studies. October 2022 her creatinine is 1.48. Was 1.43 in June 2023 and now most recently in September 11, 2023 creatinine a bit higher at 1.53 with an estimated GFR 36 cc/min. Electrolytes were good. Hemoglobin 14.2 but A1c was 8.4. I therefore informed the patient that her kidney function has deteriorated slightly. Reinforced the importance of blood pressure and blood sugar control. Blood pressure is good today. Continue to work on getting her A1c under better control. Maintain adequate hydration. Avoid nonsteroidals. Repeat CBC and renal panel quarterly.   I will see her again in 6 months for What Type Of Note Output Would You Prefer (Optional)?: Bullet Format Hpi Title: Evaluation of Skin Lesions follow-up or sooner if clinically indicated. Thank you again for allowing me to participate in the care of your patient. If you have any questions please feel free to call.            Sincerely,   Kd Del Valle MD   [facility], [address] [place] [address]  [address]  [place] [id number]    Document electronically generated by:  Kd Del Valle MD

## 2023-11-14 RX ORDER — OMEPRAZOLE 20 MG/1
CAPSULE, DELAYED RELEASE ORAL
Qty: 90 CAPSULE | Refills: 0 | Status: SHIPPED | OUTPATIENT
Start: 2023-11-14

## 2023-11-26 RX ORDER — FUROSEMIDE 20 MG/1
20 TABLET ORAL DAILY
Qty: 90 TABLET | Refills: 0 | Status: SHIPPED | OUTPATIENT
Start: 2023-11-26

## 2023-11-29 ENCOUNTER — OFFICE VISIT (OUTPATIENT)
Dept: INFUSION THERAPY | Age: 73
End: 2023-11-29
Attending: INTERNAL MEDICINE

## 2023-11-29 VITALS
WEIGHT: 293 LBS | OXYGEN SATURATION: 97 % | RESPIRATION RATE: 16 BRPM | HEART RATE: 71 BPM | TEMPERATURE: 96.6 F | BODY MASS INDEX: 52.26 KG/M2 | DIASTOLIC BLOOD PRESSURE: 86 MMHG | SYSTOLIC BLOOD PRESSURE: 149 MMHG

## 2023-11-29 DIAGNOSIS — M05.711 RHEUMATOID ARTHRITIS INVOLVING RIGHT SHOULDER WITH POSITIVE RHEUMATOID FACTOR (CMD): Primary | ICD-10-CM

## 2023-11-29 PROCEDURE — 10002807 HB RX 258: Performed by: INTERNAL MEDICINE

## 2023-11-29 PROCEDURE — 96415 CHEMO IV INFUSION ADDL HR: CPT

## 2023-11-29 PROCEDURE — 96413 CHEMO IV INFUSION 1 HR: CPT

## 2023-11-29 PROCEDURE — 10002800 HB RX 250 W HCPCS: Performed by: INTERNAL MEDICINE

## 2023-11-29 PROCEDURE — 96375 TX/PRO/DX INJ NEW DRUG ADDON: CPT

## 2023-11-29 RX ORDER — FAMOTIDINE 10 MG/ML
20 INJECTION, SOLUTION INTRAVENOUS
OUTPATIENT
Start: 2023-11-29

## 2023-11-29 RX ORDER — DIPHENHYDRAMINE HCL 25 MG
25 CAPSULE ORAL ONCE
OUTPATIENT
Start: 2023-11-29 | End: 2023-11-29

## 2023-11-29 RX ORDER — METHYLPREDNISOLONE SODIUM SUCCINATE 125 MG/2ML
125 INJECTION, POWDER, LYOPHILIZED, FOR SOLUTION INTRAMUSCULAR; INTRAVENOUS
OUTPATIENT
Start: 2023-11-29

## 2023-11-29 RX ORDER — SODIUM CHLORIDE 9 MG/ML
INJECTION, SOLUTION INTRAVENOUS CONTINUOUS PRN
OUTPATIENT
Start: 2023-11-29

## 2023-11-29 RX ORDER — DIPHENHYDRAMINE HYDROCHLORIDE 50 MG/ML
12.5 INJECTION INTRAMUSCULAR; INTRAVENOUS ONCE
Status: COMPLETED | OUTPATIENT
Start: 2023-11-29 | End: 2023-11-29

## 2023-11-29 RX ORDER — DIPHENHYDRAMINE HYDROCHLORIDE 50 MG/ML
50 INJECTION INTRAMUSCULAR; INTRAVENOUS
OUTPATIENT
Start: 2023-11-29

## 2023-11-29 RX ORDER — DIPHENHYDRAMINE HYDROCHLORIDE 50 MG/ML
12.5 INJECTION INTRAMUSCULAR; INTRAVENOUS ONCE
Start: 2023-11-29 | End: 2023-11-29

## 2023-11-29 RX ORDER — 0.9 % SODIUM CHLORIDE 0.9 %
2 VIAL (ML) INJECTION PRN
OUTPATIENT
Start: 2023-11-29

## 2023-11-29 RX ORDER — ACETAMINOPHEN 325 MG/1
650 TABLET ORAL ONCE
OUTPATIENT
Start: 2023-11-29 | End: 2023-11-29

## 2023-11-29 RX ADMIN — INFLIXIMAB 700 MG: 100 INJECTION, POWDER, LYOPHILIZED, FOR SOLUTION INTRAVENOUS at 10:47

## 2023-11-29 RX ADMIN — DIPHENHYDRAMINE HYDROCHLORIDE 12.5 MG: 50 INJECTION, SOLUTION INTRAMUSCULAR; INTRAVENOUS at 10:40

## 2023-11-29 RX ADMIN — SODIUM CHLORIDE 250 ML: 9 INJECTION, SOLUTION INTRAVENOUS at 10:35

## 2023-11-29 ASSESSMENT — PAIN SCALES - GENERAL
PAINLEVEL_OUTOF10: 0
PAINLEVEL: 0

## 2023-11-29 ASSESSMENT — ACTIVITIES OF DAILY LIVING (ADL)
ADL_BEFORE_ADMISSION: INDEPENDENT
ADL_SCORE: 12

## 2023-11-30 ENCOUNTER — APPOINTMENT (OUTPATIENT)
Dept: INFUSION THERAPY | Age: 73
End: 2023-11-30
Attending: INTERNAL MEDICINE

## 2023-12-12 DIAGNOSIS — E78.5 DYSLIPIDEMIA: ICD-10-CM

## 2023-12-12 RX ORDER — TERAZOSIN 10 MG/1
CAPSULE ORAL
Qty: 90 CAPSULE | Refills: 0 | Status: SHIPPED | OUTPATIENT
Start: 2023-12-12

## 2023-12-12 RX ORDER — VALSARTAN AND HYDROCHLOROTHIAZIDE 320; 12.5 MG/1; MG/1
TABLET, FILM COATED ORAL
Qty: 90 TABLET | Refills: 0 | Status: SHIPPED | OUTPATIENT
Start: 2023-12-12

## 2023-12-13 ENCOUNTER — APPOINTMENT (OUTPATIENT)
Dept: FAMILY MEDICINE | Age: 73
End: 2023-12-13

## 2023-12-24 DIAGNOSIS — E78.5 DYSLIPIDEMIA: ICD-10-CM

## 2023-12-25 ENCOUNTER — EXTERNAL LAB (OUTPATIENT)
Dept: OTHER | Age: 73
End: 2023-12-25

## 2023-12-25 LAB — HEMOCCULT STL QL: NEGATIVE

## 2023-12-25 RX ORDER — VALSARTAN AND HYDROCHLOROTHIAZIDE 320; 12.5 MG/1; MG/1
TABLET, FILM COATED ORAL
Qty: 90 TABLET | Refills: 0 | Status: SHIPPED | OUTPATIENT
Start: 2023-12-25

## 2023-12-30 DIAGNOSIS — E08.40 DIABETES MELLITUS DUE TO UNDERLYING CONDITION WITH DIABETIC NEUROPATHY, WITHOUT LONG-TERM CURRENT USE OF INSULIN (CMD): ICD-10-CM

## 2024-01-01 ENCOUNTER — EXTERNAL RECORD (OUTPATIENT)
Dept: OTHER | Age: 74
End: 2024-01-01

## 2024-01-01 RX ORDER — BLOOD SUGAR DIAGNOSTIC
STRIP MISCELLANEOUS
Qty: 100 EACH | Refills: 0 | Status: SHIPPED | OUTPATIENT
Start: 2024-01-01

## 2024-01-01 RX ORDER — PEN NEEDLE, DIABETIC 32GX 5/32"
NEEDLE, DISPOSABLE MISCELLANEOUS
Qty: 100 EACH | Refills: 0 | Status: SHIPPED | OUTPATIENT
Start: 2024-01-01

## 2024-01-04 ENCOUNTER — OFFICE VISIT (OUTPATIENT)
Dept: RHEUMATOLOGY | Facility: CLINIC | Age: 74
End: 2024-01-04
Payer: MEDICARE

## 2024-01-04 ENCOUNTER — TELEPHONE (OUTPATIENT)
Dept: RHEUMATOLOGY | Facility: CLINIC | Age: 74
End: 2024-01-04

## 2024-01-04 ENCOUNTER — EXTERNAL RECORD (OUTPATIENT)
Dept: HEALTH INFORMATION MANAGEMENT | Facility: OTHER | Age: 74
End: 2024-01-04

## 2024-01-04 VITALS
WEIGHT: 293 LBS | DIASTOLIC BLOOD PRESSURE: 85 MMHG | BODY MASS INDEX: 51 KG/M2 | SYSTOLIC BLOOD PRESSURE: 147 MMHG | HEART RATE: 80 BPM

## 2024-01-04 DIAGNOSIS — M05.79 RHEUMATOID ARTHRITIS INVOLVING MULTIPLE SITES WITH POSITIVE RHEUMATOID FACTOR (HCC): Primary | ICD-10-CM

## 2024-01-04 DIAGNOSIS — N18.9 CHRONIC KIDNEY DISEASE, UNSPECIFIED CKD STAGE: ICD-10-CM

## 2024-01-04 DIAGNOSIS — E13.40 OTHER SPECIFIED DIABETES MELLITUS WITH DIABETIC NEUROPATHY, UNSPECIFIED WHETHER LONG TERM INSULIN USE (HCC): ICD-10-CM

## 2024-01-04 DIAGNOSIS — D47.2 MGUS (MONOCLONAL GAMMOPATHY OF UNKNOWN SIGNIFICANCE): ICD-10-CM

## 2024-01-04 DIAGNOSIS — M06.4 INFLAMMATORY POLYARTHRITIS (HCC): ICD-10-CM

## 2024-01-04 DIAGNOSIS — S42.409D CLOSED FRACTURE OF ELBOW WITH ROUTINE HEALING, UNSPECIFIED LATERALITY, SUBSEQUENT ENCOUNTER: ICD-10-CM

## 2024-01-04 DIAGNOSIS — R79.9 ABNORMAL BLOOD FINDING: ICD-10-CM

## 2024-01-04 PROCEDURE — 99205 OFFICE O/P NEW HI 60 MIN: CPT | Performed by: INTERNAL MEDICINE

## 2024-01-04 PROCEDURE — 3077F SYST BP >= 140 MM HG: CPT | Performed by: INTERNAL MEDICINE

## 2024-01-04 PROCEDURE — 1159F MED LIST DOCD IN RCRD: CPT | Performed by: INTERNAL MEDICINE

## 2024-01-04 PROCEDURE — 3079F DIAST BP 80-89 MM HG: CPT | Performed by: INTERNAL MEDICINE

## 2024-01-04 PROCEDURE — 1160F RVW MEDS BY RX/DR IN RCRD: CPT | Performed by: INTERNAL MEDICINE

## 2024-01-04 PROCEDURE — 1126F AMNT PAIN NOTED NONE PRSNT: CPT | Performed by: INTERNAL MEDICINE

## 2024-01-04 NOTE — TELEPHONE ENCOUNTER
Hello-this patient is transferring care to us from a different rheumatologist.  She is currently being treated with Remicade every 8 weeks with last dose 11/29/2023.  Could we look into a PA for this medication for her to be received at our infusion center?  Thank you.

## 2024-01-04 NOTE — PROGRESS NOTES
RHEUMATOLOGY CLINIC- NEW PATIENT    Fatimah Coffey is a 73 year old female.    ASSESSMENT/PLAN:       ICD-10-CM    1. Rheumatoid arthritis involving multiple sites with positive rheumatoid factor ([facility])  M05.79 XR DEXA BONE DENSITOMETRY (CPT=77080)     Hepatitis Panel, Acute (4)     Quantiferon TB Plus     C-Reactive Protein     Sed Rate, Westergren (Automated)     Cyclic Citrullinate Pep. IGG     Rheumatoid Arthritis Factor     CBC With Differential With Platelet     Comp Metabolic Panel (14)      2. Inflammatory polyarthritis ([facility])  M06.4 XR DEXA BONE DENSITOMETRY (CPT=77080)     Hepatitis Panel, Acute (4)     Quantiferon TB Plus     C-Reactive Protein     Sed Rate, Westergren (Automated)     Cyclic Citrullinate Pep. IGG     Rheumatoid Arthritis Factor     CBC With Differential With Platelet     Comp Metabolic Panel (14)      3. Gamma Gap  R79.9 CBC With Differential With Platelet     Comp Metabolic Panel (14)      4. MGUS (monoclonal gammopathy of unknown significance)  D47.2 CBC With Differential With Platelet     Comp Metabolic Panel (14)      5. Chronic kidney disease, unspecified CKD stage  N18.9 XR DEXA BONE DENSITOMETRY (CPT=77080)     Hepatitis Panel, Acute (4)     Quantiferon TB Plus     C-Reactive Protein     Sed Rate, Westergren (Automated)     Cyclic Citrullinate Pep. IGG     Rheumatoid Arthritis Factor     CBC With Differential With Platelet     Comp Metabolic Panel (14)      6. Other specified diabetes mellitus with diabetic neuropathy, unspecified whether long term insulin use ([facility])  E13.40 XR DEXA BONE DENSITOMETRY (CPT=77080)     Hepatitis Panel, Acute (4)     Quantiferon TB Plus     C-Reactive Protein     Sed Rate, Westergren (Automated)     Cyclic Citrullinate Pep. IGG     Rheumatoid Arthritis Factor     CBC With Differential With Platelet     Comp Metabolic Panel (14)      7. Closed fracture of elbow with routine healing, unspecified laterality, subsequent encounter  S42.409D XR DEXA BONE  DENSITOMETRY (CPT=77080)          DISCUSSION: Patient presents as a new outpatient referral for longstanding rheumatoid arthritis currently well-controlled on long-term Remicade, methotrexate, and folic acid without noted side effect.  Additionally, on exam, patient without synovitis.  Therefore, we will continue current regimen and work on having her infusions performed at a [facility].  Discussed with patient, will also need additional lab work to ensure safety of the medications and to monitor for disease/medication induced side effects.    Additionally, after further questioning, it appears patient has a history of fragility fracture although last DEXA scan a few years ago was normal.  Therefore, recommended repeat DEXA at this time.    PLAN:  - Continue methotrexate 2.5 mg tablets x 4 tablets once weekly with folic acid 1 mg daily  - Continue Remicade every 8 hours with premedications of Benadryl.  Will look into a PA to have Remicade performed at our infusion center (last dose 11/29/2023)  - DEXA ordered.  Will discuss indications for osteoporotic treatment at next appointment  - Patient obtain the above nonfasting lab work.  - Consult/evaluation communicated with referring physician/provider.    Patient will follow-up in 3 months with repeat labs drawn prior.     Nagi Romano DO  1/4/2024   11:13 AM    HPI:     Chief Complaint   Patient presents with    Rheumatoid Arthritis     Looking to switch rheumatologists. Has been on Remicade q 8 wks since 2001. Currently treated at [facility] by Dr BONI Ayers. P: [phone]       I had the pleasure of seeing Fatimah Coffey on 1/4/2024 as a new outpatient consultation for RA. The patient was originally referred by her PCP.     73 year old female w/ PMH RA, MGUS (follows w/ Heme/Onc: Dr. Cox), HTN, HLD CKD, left knee arthroplasty, DM c/b neuropathy, GERD, elbow fracture from ground-level who presents to clinic today.    Of note, patient formally following  with Dr. Ayers ([facility]) but had moved to the [facility] more recently.  She has been treated for her rheumatoid arthritis since early 2000's-for starting with Remicade and methotrexate added on sometime later.  She presented with severe polyarthralgias as well as \"lumps\" along her hands.  Similarly, her flares would present as arthralgias.  However, since being on Remicade, folic acid, and methotrexate her disease has been relatively quiescent.  She denies current pain and rates it as a 0/10.  She only has a few minutes of morning stiffness and is hoping to get it back into water aerobics as this seemed to improve her stiffness.  Methotrexate, Remicade, folic acid.  ROS negative at this time for fever, chills, rash, photosensitivity, Raynaud's phenomena, sicca symptoms, GI upset, mucositis, oral/nasal ulcers.    Additionally, last DEXA reviewed, as below, which was normal no noted side effects from.  She states that a couple of years prior, she had fallen while on a snow bank to the floor, fracturing her elbow.      Current Medications:  Folic acid 1 mg  Methotrexate 2.5 mg tablets x 4 tablets once weekly- added to Remicade         -Formerly on 6 tabs qw, however, decreased to 4 tabs qw given anemia    Remicade q8w(start 2001)     Medication History:  No other Dmards/Immunologics    Interval History:   See Above    HISTORY:  Past Medical History:   Diagnosis Date    Arthritis     Rhumatoid    Bursitis 12/2020    Chronic kidney disease (CKD)     COVID-19 01/2021    Diabetes mellitus (HCC)     Essential hypertension     Hyperlipidemia     Kidney disease       Social Hx Reviewed   Family Hx Reviewed     Medications (Active prior to today's visit):  Current Outpatient Medications   Medication Sig Dispense Refill    Magnesium Oxide Does not apply Powder Take by mouth daily.      aspirin 81 MG Oral Tab EC Take 1 tablet (81 mg total) by mouth daily.      atorvastatin 40 MG Oral Tab Take 1 tablet (40 mg total) by  mouth daily.      ezetimibe 10 MG Oral Tab Take 1 tablet (10 mg total) by mouth daily.      omeprazole 20 MG Oral Capsule Delayed Release Take 1 capsule (20 mg total) by mouth every morning. 90 capsule 3    sodium chloride 0.9 % SOLN with inFLIXimab 100 MG SOLR 5 mg/kg Inject into the vein.      PRUDOXIN 5 % External Cream       methotrexate 2.5 MG Oral Tab Take 4 tablets (10 mg total) by mouth once a week.      TRULICITY 1.5 MG/0.5ML Subcutaneous Solution Pen-injector Inject 3 mL into the skin once a week.  3    glimepiride 4 MG Oral Tab Take by mouth every morning before breakfast.        Metoprolol Succinate ER 50 MG Oral Tablet 24 Hr Take 1 tablet (50 mg total) by mouth in the morning and 1 tablet (50 mg total) before bedtime.      folic acid 1 MG Oral Tab Take 1 tablet (1 mg total) by mouth daily.      Terazosin HCl 10 MG Oral Cap Take 1 capsule (10 mg total) by mouth nightly.      Glucose Blood (ONETOUCH ULTRA BLUE) In Vitro Strip TEST BID  3    furosemide 20 MG Oral Tab TK 1 T PO QAM  0    Valsartan-Hydrochlorothiazide 320-12.5 MG Oral Tab TK 1 T PO D  0    ACCU-CHEK MULTICLIX LANCETS Does not apply Misc          Allergies:  No Known Allergies      ROS:   Review of Systems   Constitutional:  Negative for chills and fever.   HENT:  Negative for congestion, hearing loss, mouth sores, nosebleeds and trouble swallowing.    Eyes:  Negative for photophobia, pain, redness and visual disturbance.   Respiratory:  Negative for cough and shortness of breath.    Cardiovascular:  Negative for chest pain, palpitations and leg swelling.   Gastrointestinal:  Negative for abdominal pain, blood in stool, diarrhea and nausea.   Endocrine: Negative for cold intolerance and heat intolerance.   Genitourinary:  Negative for dysuria, frequency and hematuria.   Musculoskeletal:  Positive for arthralgias (occasional). Negative for back pain, gait problem, joint swelling, myalgias, neck pain and neck stiffness.   Skin:  Negative for  color change and rash.   Neurological:  Negative for dizziness, weakness, numbness and headaches.   Psychiatric/Behavioral:  Negative for confusion and dysphoric mood.         PHYSICAL EXAM:     Constitutional:  Well developed, Well nourished, No acute distress  HENT:  Normocephalic, Atraumatic, Bilateral external ears normal, Oropharynx moist, No oral exudates.  Neck: Normal range of motion, No tenderness, Supple, No stridor.  Eyes:  PERRL, EOMI, Conjunctiva normal, No discharge.  Respiratory:  Normal breath sounds, No respiratory distress, No wheezing.  Cardiovascular:  Normal heart rate, Normal rhythm, No murmurs, No rubs, No gallops.  GI:  Bowel sounds normal, Soft, No tenderness, No masses, No pulsatile masses.  : No CVA tenderness.  Musculoskeletal:  A comprehensive 28 count joint exam was done and was negative for swelling or tenderness except as noted. Inspections for misalignment, asymmetry, crepitation, defects, tenderness, masses, nodules, effusions, range of motion, and stability in the upper and lower extremities bilaterally are all normal unless noted.           Integument:  Warm, Dry, No erythema, No rash.  Lymphatic:  No lymphadenopathy noted.  Neurologic:  Alert & oriented x 3, Normal motor function, Normal sensory function, No focal deficits noted.  Psychiatric:  Affect normal, Judgment normal, Mood normal.    LABS:     Prior lab work reviewed and notable for:    9/11/2023  CBC without cytopenias and no leukocytosis  CMP with elevated CR 1.53, BUN 20, LFTs not elevated but gamma gap noted    SPEP:  Component  Ref Range & Units 9/11/23 11:24 AM   Total Protein  6.4 - 8.2 g/dL 8.5 High    Albumin  3.75 - 5.21 g/dL 3.77   Alpha-1-Globulins  0.19 - 0.46 g/dL 0.27   Alpha-2-Globulins  0.48 - 1.05 g/dL 0.69   Beta Globulins  0.68 - 1.23 g/dL 1.42 High    Gamma Globulins  0.62 - 1.70 g/dL 2.35 High    Albumin/Globulin Ratio  1.00 - 2.00 0.80 Low    M-Jonah  <=0.00 g/dL 0.23 High    SPE Interpretation    Serum protein electrophoresis demonstrates increased total protein, increased beta region protein, increased gamma region protein, the presence of an abnormal peak/band in the beta region identified as IgG lambda monoclonal protein by capillary immunotyping (This monoclonal protein cannot be quantitated due to procedural limitations associated with monoclonal protein being in the beta region) and the presence of a small abnormal peak/band in the gamma region identified as IgM kappa monoclonal protein by capillary immunotyping. This small monoclonal protein is present within a background of polyclonal protein in the gamma region.    These gamma region findings were also identified in a previous serum specimen from 10/18/2022.    Currently measured serum immunoglobulins demonstrate elevated serum IgG, IgA, and IgM.    Increased beta globulins may be seen in liver and lipid disorders, certain monoclonal gammopathies, and non-fasting specimens.    Polyclonal gammopathies may be seen in association with chronic liver or lung diseases, connective tissue diseases, chronic infection and miscellaneous conditions (metastatic carcinoma, cystic fibrosis, recovery from thermal burns, etc.).    Clinically, a history of monoclonal gammopathy of undetermined significance (MGUS) is present in the patient's Epic EMR.  Close clinical follow-up is recommended.     2023:  ESR 42 (high)  CRP 0.4 WNL    Hemoglobin A1c 9.0    2022  TSH 2.185 WNL    2022:  24-hour urine Bence Kenney Protein: Negative for free monoclonal light chains  Imagin/9/2022 right elbow XR:    Impression   CONCLUSION:     Acute posterior dislocation of the elbow joint with small avulsion fracture fragments in the empty olecranon fossa.  Probable impacted radial neck fracture, difficult to discern due to the level of bony overlap from the dislocation.     There is extensive soft tissue swelling and a probable joint effusion.            Impression   CONCLUSION:     * Single lateral view of the elbow was obtained.  * The elbow appears anatomic position alignment in this 1 projection.  * Displaced fractures of the radial head/neck, coronoid process and possibly the tip of the olecranon noted.  * Olecranon spur is seen.  * There is soft tissue swelling.  * A preliminary report was submitted and there is agreement without major discrepancies.       2/1/20 Hip XR:       Impression   CONCLUSION:  1. Moderate osteoarthritis left hip.       3/12/20 DEXA:     FINDINGS:             LEFT FEMORAL NECK               BMD:    1.003 gm/sq. cm.            T SCORE:         1.4        Z SCORE:         1.7     LEFT TOTAL HIP               BMD:    0.975 gm/sq. cm.            T SCORE:         0.3        Z SCORE:         0.7     PA LUMBAR SPINE (L1 - L4)               BMD:    1.170 gm/sq. cm.            T SCORE:         1.1        Z SCORE:         2.3                     T scores are a comparison to sex-matched patients with mean peak bone mass and are given in standard deviation (s.d.).  Each 1 s.d. corresponds to approximately 10% below peak normal bone density.       WORLD HEALTH ORGANIZATION CRITERIA  NORMAL T SCORE:      Above -1 s.d.    OSTEOPENIA T SCORE:           Between -1 and -2.5 s.d.    OSTEOPOROSIS T SCORE:     -2.5 s.d.     National Osteoporosis Foundation Clinician's Guide to Prevention and Treatment of Osteoporosis recommendations for treatment:  Post menopausal women and men age 50 and older presenting with the following should be considered for treatment:  * A hip or vertebral (clinical or morphometric) fracture  * T score < -2.5 at the femoral neck or spine after appropriate evaluation to exclude secondary causes.  * Low bone mass (T score between -1.0 and -2.5 at the femoral neck or spine) and a 10-year probability of a hip fracture > 3% or a 10-year probability of a major osteoporosis-related fracture > 20% based on the [place]-adapted WHO algorithm               =====  CONCLUSION:   1. Normal bone density left hip and lumbar spine.

## 2024-01-05 ENCOUNTER — LAB ENCOUNTER (OUTPATIENT)
Dept: LAB | Facility: HOSPITAL | Age: 74
End: 2024-01-05
Attending: INTERNAL MEDICINE
Payer: MEDICARE

## 2024-01-05 DIAGNOSIS — M05.79 RHEUMATOID ARTHRITIS INVOLVING MULTIPLE SITES WITH POSITIVE RHEUMATOID FACTOR (HCC): ICD-10-CM

## 2024-01-05 DIAGNOSIS — N18.32 STAGE 3B CHRONIC KIDNEY DISEASE (HCC): ICD-10-CM

## 2024-01-05 DIAGNOSIS — R79.9 ABNORMAL BLOOD FINDING: ICD-10-CM

## 2024-01-05 DIAGNOSIS — R79.9 ABNORMAL BLOOD CHEMISTRY TEST: Primary | ICD-10-CM

## 2024-01-05 DIAGNOSIS — D47.2 MGUS (MONOCLONAL GAMMOPATHY OF UNKNOWN SIGNIFICANCE): ICD-10-CM

## 2024-01-05 DIAGNOSIS — Z79.899 ENCOUNTER FOR LONG-TERM (CURRENT) USE OF HIGH-RISK MEDICATION: Primary | ICD-10-CM

## 2024-01-05 DIAGNOSIS — N18.9 CHRONIC KIDNEY DISEASE, UNSPECIFIED CKD STAGE: ICD-10-CM

## 2024-01-05 DIAGNOSIS — M06.4 INFLAMMATORY POLYARTHRITIS (HCC): ICD-10-CM

## 2024-01-05 DIAGNOSIS — E13.40 OTHER SPECIFIED DIABETES MELLITUS WITH DIABETIC NEUROPATHY, UNSPECIFIED WHETHER LONG TERM INSULIN USE (HCC): ICD-10-CM

## 2024-01-05 LAB
ALBUMIN SERPL-MCNC: 3.9 G/DL (ref 3.2–4.8)
ALBUMIN/GLOB SERPL: 0.8 {RATIO} (ref 1–2)
ALP LIVER SERPL-CCNC: 84 U/L
ALT SERPL-CCNC: 28 U/L
ANION GAP SERPL CALC-SCNC: 13 MMOL/L (ref 0–18)
AST SERPL-CCNC: 29 U/L (ref ?–34)
BASOPHILS # BLD AUTO: 0.05 X10(3) UL (ref 0–0.2)
BASOPHILS NFR BLD AUTO: 0.8 %
BILIRUB SERPL-MCNC: 1 MG/DL (ref 0.2–1.1)
BUN BLD-MCNC: 16 MG/DL (ref 9–23)
BUN/CREAT SERPL: 10.7 (ref 10–20)
CALCIUM BLD-MCNC: 9.4 MG/DL (ref 8.7–10.4)
CHLORIDE SERPL-SCNC: 103 MMOL/L (ref 98–112)
CO2 SERPL-SCNC: 24 MMOL/L (ref 21–32)
CREAT BLD-MCNC: 1.49 MG/DL
CRP SERPL-MCNC: <0.4 MG/DL (ref ?–1)
DEPRECATED RDW RBC AUTO: 49.1 FL (ref 35.1–46.3)
EGFRCR SERPLBLD CKD-EPI 2021: 37 ML/MIN/1.73M2 (ref 60–?)
EOSINOPHIL # BLD AUTO: 0.27 X10(3) UL (ref 0–0.7)
EOSINOPHIL NFR BLD AUTO: 4.3 %
ERYTHROCYTE [DISTWIDTH] IN BLOOD BY AUTOMATED COUNT: 14.3 % (ref 11–15)
ERYTHROCYTE [SEDIMENTATION RATE] IN BLOOD: 50 MM/HR
FASTING STATUS PATIENT QL REPORTED: YES
GLOBULIN PLAS-MCNC: 4.8 G/DL (ref 2.8–4.4)
GLUCOSE BLD-MCNC: 222 MG/DL (ref 70–99)
HAV IGM SER QL: NONREACTIVE
HBV CORE IGM SER QL: NONREACTIVE
HBV SURFACE AG SERPL QL IA: NONREACTIVE
HCT VFR BLD AUTO: 43.5 %
HCV AB SERPL QL IA: NONREACTIVE
HGB BLD-MCNC: 14.3 G/DL
IMM GRANULOCYTES # BLD AUTO: 0.01 X10(3) UL (ref 0–1)
IMM GRANULOCYTES NFR BLD: 0.2 %
LYMPHOCYTES # BLD AUTO: 2.97 X10(3) UL (ref 1–4)
LYMPHOCYTES NFR BLD AUTO: 47.4 %
MCH RBC QN AUTO: 31.3 PG (ref 26–34)
MCHC RBC AUTO-ENTMCNC: 32.9 G/DL (ref 31–37)
MCV RBC AUTO: 95.2 FL
MONOCYTES # BLD AUTO: 0.48 X10(3) UL (ref 0.1–1)
MONOCYTES NFR BLD AUTO: 7.7 %
NEUTROPHILS # BLD AUTO: 2.48 X10 (3) UL (ref 1.5–7.7)
NEUTROPHILS # BLD AUTO: 2.48 X10(3) UL (ref 1.5–7.7)
NEUTROPHILS NFR BLD AUTO: 39.6 %
OSMOLALITY SERPL CALC.SUM OF ELEC: 298 MOSM/KG (ref 275–295)
PHOSPHATE SERPL-MCNC: 2.8 MG/DL (ref 2.4–5.1)
PLATELET # BLD AUTO: 191 10(3)UL (ref 150–450)
POTASSIUM SERPL-SCNC: 4.1 MMOL/L (ref 3.5–5.1)
PROT SERPL-MCNC: 8.7 G/DL (ref 5.7–8.2)
RBC # BLD AUTO: 4.57 X10(6)UL
RHEUMATOID FACT SERPL-ACNC: 125 IU/ML (ref ?–14)
SODIUM SERPL-SCNC: 140 MMOL/L (ref 136–145)
WBC # BLD AUTO: 6.3 X10(3) UL (ref 4–11)

## 2024-01-05 PROCEDURE — 86334 IMMUNOFIX E-PHORESIS SERUM: CPT | Performed by: INTERNAL MEDICINE

## 2024-01-05 PROCEDURE — 86140 C-REACTIVE PROTEIN: CPT

## 2024-01-05 PROCEDURE — 84165 PROTEIN E-PHORESIS SERUM: CPT | Performed by: INTERNAL MEDICINE

## 2024-01-05 PROCEDURE — 83521 IG LIGHT CHAINS FREE EACH: CPT | Performed by: INTERNAL MEDICINE

## 2024-01-05 PROCEDURE — 36415 COLL VENOUS BLD VENIPUNCTURE: CPT

## 2024-01-05 PROCEDURE — 85652 RBC SED RATE AUTOMATED: CPT

## 2024-01-05 PROCEDURE — 80074 ACUTE HEPATITIS PANEL: CPT

## 2024-01-05 PROCEDURE — 84100 ASSAY OF PHOSPHORUS: CPT

## 2024-01-05 PROCEDURE — 80053 COMPREHEN METABOLIC PANEL: CPT

## 2024-01-05 PROCEDURE — 86200 CCP ANTIBODY: CPT | Performed by: INTERNAL MEDICINE

## 2024-01-05 PROCEDURE — 85025 COMPLETE CBC W/AUTO DIFF WBC: CPT

## 2024-01-05 PROCEDURE — 86431 RHEUMATOID FACTOR QUANT: CPT | Performed by: INTERNAL MEDICINE

## 2024-01-05 PROCEDURE — 86480 TB TEST CELL IMMUN MEASURE: CPT

## 2024-01-05 NOTE — TELEPHONE ENCOUNTER
Please details regarding Remicade dose so treatment plan can be created and then submitted for PA.

## 2024-01-06 PROCEDURE — 82274 ASSAY TEST FOR BLOOD FECAL: CPT | Performed by: CLINICAL MEDICAL LABORATORY

## 2024-01-06 NOTE — TELEPHONE ENCOUNTER
Hello: The Remicade would be at maintenance dosing- IV Remicade 700 mg for 1 dose, infuse slowly over at least 2 hours. Will repeat every 8 weeks.     For Remicade Infusion, titrate as follows:  10ml/hour over 15 mins  20 ml/hour over 15 mins  40 ml/hour over 15 mins  80 ml/hour over 15 mins  150ml/hour over 30 mins  250 ml/hour until infusion completes    If patient reacts:  Stop infusion and resume at 10 ml/hr once reaction has resolved and follow rate schedule      Premedications: IV Benadryl 12.5 mg 30 minutes prior to Remicade infusion

## 2024-01-08 LAB — CCP IGG SERPL-ACNC: 234 U/ML (ref 0–6.9)

## 2024-01-09 ENCOUNTER — TELEPHONE (OUTPATIENT)
Dept: FAMILY MEDICINE | Age: 74
End: 2024-01-09

## 2024-01-09 DIAGNOSIS — Z12.11 COLON CANCER SCREENING: ICD-10-CM

## 2024-01-09 LAB
HEMOCCULT STL QL: NEGATIVE
M TB IFN-G CD4+ T-CELLS BLD-ACNC: 1.88 IU/ML
M TB TUBERC IFN-G BLD QL: NEGATIVE
M TB TUBERC IGNF/MITOGEN IGNF CONTROL: >10 IU/ML
QFT TB1 AG MINUS NIL: -0.51 IU/ML
QFT TB2 AG MINUS NIL: -0.12 IU/ML

## 2024-01-10 PROBLEM — M05.79 SEROPOSITIVE RHEUMATOID ARTHRITIS OF MULTIPLE SITES (HCC): Status: ACTIVE | Noted: 2024-01-10

## 2024-01-10 LAB
ALBUMIN SERPL ELPH-MCNC: 3.77 G/DL (ref 3.75–5.21)
ALBUMIN/GLOB SERPL: 0.8 {RATIO} (ref 1–2)
ALPHA1 GLOB SERPL ELPH-MCNC: 0.29 G/DL (ref 0.19–0.46)
ALPHA2 GLOB SERPL ELPH-MCNC: 0.71 G/DL (ref 0.48–1.05)
B-GLOBULIN SERPL ELPH-MCNC: 1.4 G/DL (ref 0.68–1.23)
GAMMA GLOB SERPL ELPH-MCNC: 2.33 G/DL (ref 0.62–1.7)
KAPPA LC FREE SER-MCNC: 11.66 MG/DL (ref 0.33–1.94)
KAPPA LC FREE/LAMBDA FREE SER NEPH: 1.71 {RATIO} (ref 0.26–1.65)
LAMBDA LC FREE SERPL-MCNC: 6.81 MG/DL (ref 0.57–2.63)
M PROTEIN 1 SERPL ELPH-MCNC: 0.34 G/DL (ref ?–0)
PROT SERPL-MCNC: 8.5 G/DL (ref 5.7–8.2)

## 2024-01-10 RX ORDER — ACETAMINOPHEN 325 MG/1
650 TABLET ORAL EVERY 6 HOURS PRN
OUTPATIENT
Start: 2024-01-10

## 2024-01-10 RX ORDER — ACETAMINOPHEN 325 MG/1
650 TABLET ORAL ONCE
OUTPATIENT
Start: 2024-01-10

## 2024-01-10 RX ORDER — DIPHENHYDRAMINE HCL 25 MG
25 CAPSULE ORAL ONCE
OUTPATIENT
Start: 2024-01-10

## 2024-01-16 RX ORDER — FAMOTIDINE 10 MG/ML
20 INJECTION, SOLUTION INTRAVENOUS
OUTPATIENT
Start: 2024-01-16

## 2024-01-16 RX ORDER — 0.9 % SODIUM CHLORIDE 0.9 %
2 VIAL (ML) INJECTION PRN
OUTPATIENT
Start: 2024-01-16

## 2024-01-16 RX ORDER — DIPHENHYDRAMINE HYDROCHLORIDE 50 MG/ML
50 INJECTION INTRAMUSCULAR; INTRAVENOUS
OUTPATIENT
Start: 2024-01-16

## 2024-01-16 RX ORDER — METHYLPREDNISOLONE SODIUM SUCCINATE 125 MG/2ML
125 INJECTION, POWDER, LYOPHILIZED, FOR SOLUTION INTRAMUSCULAR; INTRAVENOUS
OUTPATIENT
Start: 2024-01-16

## 2024-01-16 RX ORDER — DIPHENHYDRAMINE HCL 25 MG
25 CAPSULE ORAL ONCE
OUTPATIENT
Start: 2024-01-16 | End: 2024-01-16

## 2024-01-16 RX ORDER — ACETAMINOPHEN 325 MG/1
650 TABLET ORAL ONCE
OUTPATIENT
Start: 2024-01-16 | End: 2024-01-16

## 2024-01-19 ENCOUNTER — TELEPHONE (OUTPATIENT)
Dept: RHEUMATOLOGY | Facility: CLINIC | Age: 74
End: 2024-01-19

## 2024-01-19 NOTE — TELEPHONE ENCOUNTER
Samantha from [employer] calling to inform that the a prior authorization is needed for patient to get Infusion Treatment Code # , and the patient has a scheduled appointment for 1/24/2024. [employer] Prior auth fax # [phone]. Please lisset fax Expedited.

## 2024-01-19 NOTE — TELEPHONE ENCOUNTER
Please see below and advise. Heads up this is not scheduled at this time.   Ordered 1/10/2024.     Future Appointments   Date Time Provider Department Center   10/3/2024  1:15 PM Andrew Cox DO [facility] HEM ONC EMO

## 2024-01-22 NOTE — TELEPHONE ENCOUNTER
Called [facility] pharmacy department at [phone] and spoke with Scarlet. Remicade has been approved for 12 months outpatient at [facility]. Auth # [id number] is valid from 1/22/2024 to 1/22/2025.      Infusion Center: Patient has been approved. Okay to schedule.

## 2024-01-24 ENCOUNTER — APPOINTMENT (OUTPATIENT)
Dept: INFUSION THERAPY | Age: 74
End: 2024-01-24
Attending: INTERNAL MEDICINE

## 2024-01-25 ENCOUNTER — OFFICE VISIT (OUTPATIENT)
Dept: HEMATOLOGY/ONCOLOGY | Facility: HOSPITAL | Age: 74
End: 2024-01-25
Attending: STUDENT IN AN ORGANIZED HEALTH CARE EDUCATION/TRAINING PROGRAM
Payer: MEDICARE

## 2024-01-25 VITALS
OXYGEN SATURATION: 98 % | WEIGHT: 293 LBS | BODY MASS INDEX: 50 KG/M2 | HEART RATE: 70 BPM | SYSTOLIC BLOOD PRESSURE: 159 MMHG | RESPIRATION RATE: 18 BRPM | DIASTOLIC BLOOD PRESSURE: 80 MMHG | TEMPERATURE: 99 F

## 2024-01-25 DIAGNOSIS — M05.79 SEROPOSITIVE RHEUMATOID ARTHRITIS OF MULTIPLE SITES (HCC): Primary | ICD-10-CM

## 2024-01-25 PROCEDURE — 96415 CHEMO IV INFUSION ADDL HR: CPT

## 2024-01-25 PROCEDURE — 96413 CHEMO IV INFUSION 1 HR: CPT

## 2024-01-25 RX ORDER — DIPHENHYDRAMINE HCL 25 MG
25 CAPSULE ORAL ONCE
OUTPATIENT
Start: 2024-03-21

## 2024-01-25 RX ORDER — DIPHENHYDRAMINE HCL 25 MG
CAPSULE ORAL
Status: COMPLETED
Start: 2024-01-25 | End: 2024-01-25

## 2024-01-25 RX ORDER — ACETAMINOPHEN 325 MG/1
650 TABLET ORAL EVERY 6 HOURS PRN
OUTPATIENT
Start: 2024-03-21

## 2024-01-25 RX ORDER — ACETAMINOPHEN 325 MG/1
650 TABLET ORAL ONCE
Status: DISCONTINUED | OUTPATIENT
Start: 2024-01-25 | End: 2024-01-25

## 2024-01-25 RX ORDER — ACETAMINOPHEN 325 MG/1
650 TABLET ORAL ONCE
OUTPATIENT
Start: 2024-03-21

## 2024-01-25 RX ORDER — DIPHENHYDRAMINE HCL 25 MG
25 CAPSULE ORAL ONCE
Status: COMPLETED | OUTPATIENT
Start: 2024-01-25 | End: 2024-01-25

## 2024-01-25 RX ADMIN — DIPHENHYDRAMINE HCL 25 MG: 25 MG CAPSULE ORAL at 10:45:00

## 2024-01-25 NOTE — PROGRESS NOTES
Pt here for Remicade . Pt denies any issues or concerns. She is a little anxious today as she is new to our infusion center.  She states that she last had her Remicade 11/29/23.     Ordering MD: Juan Antonio  Order Exp: 1/22/25     Pt tolerated infusion without difficulty or complaint. Reviewed next apt date/time: 3/21      Education Record  Learner:  Patient  Disease / Diagnosis: RA  Barriers / Limitations:  None  Method:  Brief focused and Discussion  General Topics:  Plan of care reviewed  Outcome:  Shows understanding

## 2024-02-09 RX ORDER — FOLIC ACID 1 MG/1
1000 TABLET ORAL DAILY
Qty: 90 TABLET | Refills: 0 | Status: SHIPPED | OUTPATIENT
Start: 2024-02-09

## 2024-02-28 RX ORDER — FUROSEMIDE 20 MG/1
20 TABLET ORAL DAILY
Qty: 90 TABLET | Refills: 0 | Status: SHIPPED | OUTPATIENT
Start: 2024-02-28

## 2024-03-03 DIAGNOSIS — E78.5 DYSLIPIDEMIA: ICD-10-CM

## 2024-03-03 RX ORDER — ATORVASTATIN CALCIUM 40 MG/1
TABLET, FILM COATED ORAL
Qty: 90 TABLET | Refills: 0 | Status: SHIPPED | OUTPATIENT
Start: 2024-03-03

## 2024-03-14 RX ORDER — TERAZOSIN 10 MG/1
CAPSULE ORAL
Qty: 90 CAPSULE | Refills: 0 | Status: SHIPPED | OUTPATIENT
Start: 2024-03-14

## 2024-03-21 ENCOUNTER — APPOINTMENT (OUTPATIENT)
Dept: HEMATOLOGY/ONCOLOGY | Facility: HOSPITAL | Age: 74
End: 2024-03-21
Attending: STUDENT IN AN ORGANIZED HEALTH CARE EDUCATION/TRAINING PROGRAM
Payer: MEDICARE

## 2024-03-21 ENCOUNTER — EXTERNAL RECORD (OUTPATIENT)
Dept: HEALTH INFORMATION MANAGEMENT | Facility: OTHER | Age: 74
End: 2024-03-21

## 2024-03-21 LAB
HM DILATED EYE EXAM: NORMAL
RETINOPATHY PRESENT (RTP): NO

## 2024-03-25 ENCOUNTER — CLINICAL DOCUMENTATION (OUTPATIENT)
Dept: FAMILY MEDICINE | Age: 74
End: 2024-03-25

## 2024-03-25 RX ORDER — LANCETS
EACH MISCELLANEOUS
Qty: 102 EACH | Refills: 0 | Status: SHIPPED | OUTPATIENT
Start: 2024-03-25

## 2024-03-27 ENCOUNTER — OFFICE VISIT (OUTPATIENT)
Dept: HEMATOLOGY/ONCOLOGY | Facility: HOSPITAL | Age: 74
End: 2024-03-27
Attending: STUDENT IN AN ORGANIZED HEALTH CARE EDUCATION/TRAINING PROGRAM
Payer: MEDICARE

## 2024-03-27 VITALS
WEIGHT: 293 LBS | BODY MASS INDEX: 52 KG/M2 | HEART RATE: 88 BPM | OXYGEN SATURATION: 97 % | DIASTOLIC BLOOD PRESSURE: 98 MMHG | TEMPERATURE: 99 F | SYSTOLIC BLOOD PRESSURE: 157 MMHG | RESPIRATION RATE: 18 BRPM

## 2024-03-27 DIAGNOSIS — M05.79 SEROPOSITIVE RHEUMATOID ARTHRITIS OF MULTIPLE SITES (HCC): Primary | ICD-10-CM

## 2024-03-27 PROCEDURE — 96415 CHEMO IV INFUSION ADDL HR: CPT

## 2024-03-27 PROCEDURE — 96413 CHEMO IV INFUSION 1 HR: CPT

## 2024-03-27 RX ORDER — ACETAMINOPHEN 325 MG/1
650 TABLET ORAL EVERY 6 HOURS PRN
OUTPATIENT
Start: 2024-05-15

## 2024-03-27 RX ORDER — ACETAMINOPHEN 325 MG/1
650 TABLET ORAL ONCE
Status: COMPLETED | OUTPATIENT
Start: 2024-03-27 | End: 2024-03-27

## 2024-03-27 RX ORDER — ACETAMINOPHEN 325 MG/1
650 TABLET ORAL ONCE
OUTPATIENT
Start: 2024-05-15

## 2024-03-27 RX ORDER — DIPHENHYDRAMINE HCL 25 MG
25 CAPSULE ORAL ONCE
OUTPATIENT
Start: 2024-05-15

## 2024-03-27 RX ORDER — ACETAMINOPHEN 325 MG/1
TABLET ORAL
Status: COMPLETED
Start: 2024-03-27 | End: 2024-03-27

## 2024-03-27 RX ORDER — DIPHENHYDRAMINE HCL 25 MG
25 CAPSULE ORAL ONCE
Status: COMPLETED | OUTPATIENT
Start: 2024-03-27 | End: 2024-03-27

## 2024-03-27 RX ORDER — DIPHENHYDRAMINE HCL 25 MG
CAPSULE ORAL
Status: COMPLETED
Start: 2024-03-27 | End: 2024-03-27

## 2024-03-27 RX ADMIN — DIPHENHYDRAMINE HCL 25 MG: 25 MG CAPSULE ORAL at 11:04:00

## 2024-03-27 RX ADMIN — ACETAMINOPHEN 650 MG: 325 TABLET ORAL at 11:04:00

## 2024-03-27 NOTE — PROGRESS NOTES
Pt here for Remicade . Pt denies any issues or concerns. Anxious about IV start.  IV placed per protocol to left ac vein per pt's request.  Excellent blood return and flush noted upon insertion.  Remicade started and aft 16 ml pt reported discomfort.  Site infiltrated, angiocatheter removed and PIV restarted by YANNA Gonzales.     Pt tolerated infusion without difficulty or complaint. Reviewed next apt date/time: 5/22/24 10 am

## 2024-03-28 ENCOUNTER — TELEPHONE (OUTPATIENT)
Dept: HEMATOLOGY/ONCOLOGY | Facility: HOSPITAL | Age: 74
End: 2024-03-28

## 2024-03-28 ENCOUNTER — E-ADVICE (OUTPATIENT)
Dept: FAMILY MEDICINE | Age: 74
End: 2024-03-28

## 2024-03-28 ENCOUNTER — TELEPHONE (OUTPATIENT)
Dept: FAMILY MEDICINE | Age: 74
End: 2024-03-28

## 2024-03-28 DIAGNOSIS — R22.1 NECK SWELLING: Primary | ICD-10-CM

## 2024-03-28 DIAGNOSIS — D47.2 MGUS (MONOCLONAL GAMMOPATHY OF UNKNOWN SIGNIFICANCE): ICD-10-CM

## 2024-03-28 DIAGNOSIS — M05.79 RHEUMATOID ARTHRITIS INVOLVING MULTIPLE SITES WITH POSITIVE RHEUMATOID FACTOR (HCC): ICD-10-CM

## 2024-03-28 NOTE — TELEPHONE ENCOUNTER
Called  patient, per Dr Cox he suggested to monitor.   Patient states Rheumatologist is having an US done of area.

## 2024-03-28 NOTE — TELEPHONE ENCOUNTER
3/27/24 Remicade    Left lymph node by ear is swollen and tender(she now thinks may have been there prior to tx yesterday)    Denies fevers or chills, denies sob or chest pain, denies n/v/d, is eating and drinking well.  Denies lightheadedness or dizziness, no ear aches, or runny nose or cold symptoms.  No urinary complaints.    Had concerns about Remicade infusion and issues with infiltration to IV site being related.  She also MyCharted Dr Romano same symptoms as she was not sure who to reach out to.    Please advise.

## 2024-03-28 NOTE — TELEPHONE ENCOUNTER
----- Message from Fatimah Coffey sent at 3/28/2024  1:05 PM CDT -----  Regarding: Swollen Lymph Nodes/Glands After Remicade Infusion Yesterday   Contact: [phone]  Holden Cox,  I had my Remicade Infusion yesterday, 3/27/24, and there was an issue with the first needle site (left arm); the medication began to accumulate in my arm not the vein.  A second needle site (right hand) was used with no apparent issues.  But, today I have a swollen gland; under my left ear, near the chin.  It's tender to the touch.   My face appears swollen on the left side.  No other sickness or discomfort is present.   With all my health concerns I couldn't ignore this.  Please advise.   Thank you.     Fatimah Coffey

## 2024-04-04 ENCOUNTER — E-ADVICE (OUTPATIENT)
Dept: FAMILY MEDICINE | Age: 74
End: 2024-04-04

## 2024-04-09 ENCOUNTER — OFFICE VISIT (OUTPATIENT)
Dept: FAMILY MEDICINE | Age: 74
End: 2024-04-09

## 2024-04-09 VITALS
DIASTOLIC BLOOD PRESSURE: 82 MMHG | BODY MASS INDEX: 44.41 KG/M2 | HEIGHT: 68 IN | HEART RATE: 77 BPM | WEIGHT: 293 LBS | RESPIRATION RATE: 20 BRPM | OXYGEN SATURATION: 95 % | SYSTOLIC BLOOD PRESSURE: 132 MMHG | TEMPERATURE: 98.3 F

## 2024-04-09 DIAGNOSIS — N18.30 DIABETES MELLITUS WITH STAGE 3 CHRONIC KIDNEY DISEASE (CMD): ICD-10-CM

## 2024-04-09 DIAGNOSIS — E78.5 HYPERLIPIDEMIA, UNSPECIFIED HYPERLIPIDEMIA TYPE: ICD-10-CM

## 2024-04-09 DIAGNOSIS — E11.22 DIABETES MELLITUS WITH STAGE 3 CHRONIC KIDNEY DISEASE (CMD): ICD-10-CM

## 2024-04-09 DIAGNOSIS — I10 BENIGN ESSENTIAL HYPERTENSION: ICD-10-CM

## 2024-04-09 DIAGNOSIS — R59.9 SWOLLEN LYMPH NODES: Primary | ICD-10-CM

## 2024-04-09 DIAGNOSIS — J02.0 ACUTE STREPTOCOCCAL PHARYNGITIS: ICD-10-CM

## 2024-04-09 LAB
FLUAV AG UPPER RESP QL IA.RAPID: NEGATIVE
FLUBV AG UPPER RESP QL IA.RAPID: NEGATIVE
HBA1C MFR BLD: 9.4 % (ref 4.5–5.6)
INTERNAL PROCEDURAL CONTROLS ACCEPTABLE: YES
S PYO AG THROAT QL IA.RAPID: POSITIVE
SARS-COV+SARS-COV-2 AG RESP QL IA.RAPID: NOT DETECTED
TEST LOT EXPIRATION DATE: ABNORMAL
TEST LOT NUMBER: 7949

## 2024-04-09 RX ORDER — DULAGLUTIDE 3 MG/.5ML
INJECTION, SOLUTION SUBCUTANEOUS
COMMUNITY
Start: 2024-01-24

## 2024-04-09 RX ORDER — AZITHROMYCIN 250 MG/1
TABLET, FILM COATED ORAL
Qty: 6 TABLET | Refills: 0 | Status: SHIPPED | OUTPATIENT
Start: 2024-04-09

## 2024-04-09 RX ORDER — FLUCONAZOLE 150 MG/1
TABLET ORAL
COMMUNITY
Start: 2023-12-30

## 2024-04-09 RX ORDER — AMLODIPINE BESYLATE 5 MG/1
5 TABLET ORAL DAILY
COMMUNITY

## 2024-04-09 ASSESSMENT — PATIENT HEALTH QUESTIONNAIRE - PHQ9
SUM OF ALL RESPONSES TO PHQ9 QUESTIONS 1 AND 2: 0
SUM OF ALL RESPONSES TO PHQ9 QUESTIONS 1 AND 2: 0
1. LITTLE INTEREST OR PLEASURE IN DOING THINGS: NOT AT ALL
2. FEELING DOWN, DEPRESSED OR HOPELESS: NOT AT ALL
CLINICAL INTERPRETATION OF PHQ2 SCORE: NO FURTHER SCREENING NEEDED

## 2024-04-09 ASSESSMENT — PAIN SCALES - GENERAL: PAINLEVEL: 0

## 2024-04-16 DIAGNOSIS — E08.40 DIABETES MELLITUS DUE TO UNDERLYING CONDITION WITH DIABETIC NEUROPATHY, WITHOUT LONG-TERM CURRENT USE OF INSULIN (CMD): ICD-10-CM

## 2024-04-30 ENCOUNTER — APPOINTMENT (OUTPATIENT)
Dept: FAMILY MEDICINE | Age: 74
End: 2024-04-30

## 2024-04-30 VITALS
DIASTOLIC BLOOD PRESSURE: 81 MMHG | SYSTOLIC BLOOD PRESSURE: 137 MMHG | HEIGHT: 68 IN | OXYGEN SATURATION: 98 % | HEART RATE: 67 BPM | BODY MASS INDEX: 44.41 KG/M2 | WEIGHT: 293 LBS | RESPIRATION RATE: 17 BRPM | TEMPERATURE: 98.4 F

## 2024-04-30 DIAGNOSIS — J02.0 ACUTE STREPTOCOCCAL PHARYNGITIS: ICD-10-CM

## 2024-04-30 DIAGNOSIS — Z09 FOLLOW-UP EXAM: Primary | ICD-10-CM

## 2024-04-30 DIAGNOSIS — R59.9 SWOLLEN LYMPH NODES: ICD-10-CM

## 2024-04-30 RX ORDER — TIRZEPATIDE 2.5 MG/.5ML
INJECTION, SOLUTION SUBCUTANEOUS
COMMUNITY
Start: 2024-04-10

## 2024-04-30 ASSESSMENT — PATIENT HEALTH QUESTIONNAIRE - PHQ9
2. FEELING DOWN, DEPRESSED OR HOPELESS: NOT AT ALL
SUM OF ALL RESPONSES TO PHQ9 QUESTIONS 1 AND 2: 0
CLINICAL INTERPRETATION OF PHQ2 SCORE: NO FURTHER SCREENING NEEDED
1. LITTLE INTEREST OR PLEASURE IN DOING THINGS: NOT AT ALL
SUM OF ALL RESPONSES TO PHQ9 QUESTIONS 1 AND 2: 0

## 2024-04-30 ASSESSMENT — PAIN SCALES - GENERAL: PAINLEVEL: 0

## 2024-05-06 ENCOUNTER — LAB ENCOUNTER (OUTPATIENT)
Dept: LAB | Facility: HOSPITAL | Age: 74
End: 2024-05-06
Attending: FAMILY MEDICINE
Payer: MEDICARE

## 2024-05-06 DIAGNOSIS — I10 ESSENTIAL HYPERTENSION, MALIGNANT: ICD-10-CM

## 2024-05-06 DIAGNOSIS — N18.32 STAGE 3B CHRONIC KIDNEY DISEASE (HCC): ICD-10-CM

## 2024-05-06 DIAGNOSIS — E78.2 MIXED HYPERLIPIDEMIA: ICD-10-CM

## 2024-05-06 DIAGNOSIS — Z09 FOLLOW-UP AFTER CIRCUMCISION: Primary | ICD-10-CM

## 2024-05-06 DIAGNOSIS — E11.49 TYPE 2 DIABETES MELLITUS WITH NEUROLOGICAL MANIFESTATIONS, CONTROLLED (HCC): ICD-10-CM

## 2024-05-06 LAB
ALBUMIN SERPL-MCNC: 3.8 G/DL (ref 3.2–4.8)
ALBUMIN/GLOB SERPL: 0.8 {RATIO} (ref 1–2)
ALP LIVER SERPL-CCNC: 78 U/L
ALT SERPL-CCNC: 29 U/L
ANION GAP SERPL CALC-SCNC: 9 MMOL/L (ref 0–18)
AST SERPL-CCNC: 33 U/L (ref ?–34)
BASOPHILS # BLD AUTO: 0.05 X10(3) UL (ref 0–0.2)
BASOPHILS NFR BLD AUTO: 0.8 %
BILIRUB SERPL-MCNC: 0.7 MG/DL (ref 0.2–1.1)
BUN BLD-MCNC: 20 MG/DL (ref 9–23)
BUN/CREAT SERPL: 12.2 (ref 10–20)
CALCIUM BLD-MCNC: 9.7 MG/DL (ref 8.7–10.4)
CHLORIDE SERPL-SCNC: 103 MMOL/L (ref 98–112)
CHOLEST SERPL-MCNC: 118 MG/DL (ref ?–200)
CO2 SERPL-SCNC: 29 MMOL/L (ref 21–32)
CREAT BLD-MCNC: 1.64 MG/DL
CREAT UR-SCNC: 294.7 MG/DL
DEPRECATED RDW RBC AUTO: 47.6 FL (ref 35.1–46.3)
EGFRCR SERPLBLD CKD-EPI 2021: 33 ML/MIN/1.73M2 (ref 60–?)
EOSINOPHIL # BLD AUTO: 0.29 X10(3) UL (ref 0–0.7)
EOSINOPHIL NFR BLD AUTO: 4.7 %
ERYTHROCYTE [DISTWIDTH] IN BLOOD BY AUTOMATED COUNT: 14.2 % (ref 11–15)
EST. AVERAGE GLUCOSE BLD GHB EST-MCNC: 246 MG/DL (ref 68–126)
FASTING PATIENT LIPID ANSWER: YES
FASTING STATUS PATIENT QL REPORTED: YES
GLOBULIN PLAS-MCNC: 4.6 G/DL (ref 2–3.5)
GLUCOSE BLD-MCNC: 199 MG/DL (ref 70–99)
HBA1C MFR BLD: 10.2 % (ref ?–5.7)
HCT VFR BLD AUTO: 41 %
HDLC SERPL-MCNC: 54 MG/DL (ref 40–59)
HGB BLD-MCNC: 13.8 G/DL
IMM GRANULOCYTES # BLD AUTO: 0.01 X10(3) UL (ref 0–1)
IMM GRANULOCYTES NFR BLD: 0.2 %
LDLC SERPL CALC-MCNC: 48 MG/DL (ref ?–100)
LYMPHOCYTES # BLD AUTO: 2.84 X10(3) UL (ref 1–4)
LYMPHOCYTES NFR BLD AUTO: 45.8 %
MCH RBC QN AUTO: 31.4 PG (ref 26–34)
MCHC RBC AUTO-ENTMCNC: 33.7 G/DL (ref 31–37)
MCV RBC AUTO: 93.4 FL
MICROALBUMIN UR-MCNC: 7.4 MG/DL
MICROALBUMIN/CREAT 24H UR-RTO: 25.1 UG/MG (ref ?–30)
MONOCYTES # BLD AUTO: 0.55 X10(3) UL (ref 0.1–1)
MONOCYTES NFR BLD AUTO: 8.9 %
NEUTROPHILS # BLD AUTO: 2.46 X10 (3) UL (ref 1.5–7.7)
NEUTROPHILS # BLD AUTO: 2.46 X10(3) UL (ref 1.5–7.7)
NEUTROPHILS NFR BLD AUTO: 39.6 %
NONHDLC SERPL-MCNC: 64 MG/DL (ref ?–130)
OSMOLALITY SERPL CALC.SUM OF ELEC: 300 MOSM/KG (ref 275–295)
PHOSPHATE SERPL-MCNC: 3.2 MG/DL (ref 2.4–5.1)
PLATELET # BLD AUTO: 184 10(3)UL (ref 150–450)
POTASSIUM SERPL-SCNC: 3.8 MMOL/L (ref 3.5–5.1)
PROT SERPL-MCNC: 8.4 G/DL (ref 5.7–8.2)
RBC # BLD AUTO: 4.39 X10(6)UL
SODIUM SERPL-SCNC: 141 MMOL/L (ref 136–145)
TRIGL SERPL-MCNC: 78 MG/DL (ref 30–149)
VLDLC SERPL CALC-MCNC: 11 MG/DL (ref 0–30)
WBC # BLD AUTO: 6.2 X10(3) UL (ref 4–11)

## 2024-05-06 PROCEDURE — 82570 ASSAY OF URINE CREATININE: CPT

## 2024-05-06 PROCEDURE — 85025 COMPLETE CBC W/AUTO DIFF WBC: CPT

## 2024-05-06 PROCEDURE — 80061 LIPID PANEL: CPT

## 2024-05-06 PROCEDURE — 80053 COMPREHEN METABOLIC PANEL: CPT

## 2024-05-06 PROCEDURE — 83036 HEMOGLOBIN GLYCOSYLATED A1C: CPT

## 2024-05-06 PROCEDURE — 36415 COLL VENOUS BLD VENIPUNCTURE: CPT

## 2024-05-06 PROCEDURE — 84100 ASSAY OF PHOSPHORUS: CPT

## 2024-05-06 PROCEDURE — 82043 UR ALBUMIN QUANTITATIVE: CPT

## 2024-05-07 ENCOUNTER — TELEPHONE (OUTPATIENT)
Dept: NEPHROLOGY | Facility: CLINIC | Age: 74
End: 2024-05-07

## 2024-05-13 ENCOUNTER — HOSPITAL ENCOUNTER (OUTPATIENT)
Dept: ULTRASOUND IMAGING | Facility: HOSPITAL | Age: 74
Discharge: HOME | End: 2024-05-13
Attending: INTERNAL MEDICINE

## 2024-05-13 DIAGNOSIS — R22.1 NECK SWELLING: ICD-10-CM

## 2024-05-13 DIAGNOSIS — D47.2 MGUS (MONOCLONAL GAMMOPATHY OF UNKNOWN SIGNIFICANCE): ICD-10-CM

## 2024-05-13 DIAGNOSIS — M05.79 RHEUMATOID ARTHRITIS INVOLVING MULTIPLE SITES WITH POSITIVE RHEUMATOID FACTOR (HCC): ICD-10-CM

## 2024-05-13 PROCEDURE — 76536 US EXAM OF HEAD AND NECK: CPT | Performed by: INTERNAL MEDICINE

## 2024-05-13 RX ORDER — FOLIC ACID 1 MG/1
1000 TABLET ORAL DAILY
Qty: 90 TABLET | Refills: 0 | Status: SHIPPED | OUTPATIENT
Start: 2024-05-13

## 2024-05-22 ENCOUNTER — OFFICE VISIT (OUTPATIENT)
Dept: HEMATOLOGY/ONCOLOGY | Facility: HOSPITAL | Age: 74
End: 2024-05-22
Attending: STUDENT IN AN ORGANIZED HEALTH CARE EDUCATION/TRAINING PROGRAM

## 2024-05-22 VITALS
HEART RATE: 74 BPM | WEIGHT: 293 LBS | SYSTOLIC BLOOD PRESSURE: 134 MMHG | RESPIRATION RATE: 18 BRPM | DIASTOLIC BLOOD PRESSURE: 76 MMHG | BODY MASS INDEX: 51 KG/M2 | OXYGEN SATURATION: 99 % | TEMPERATURE: 99 F

## 2024-05-22 DIAGNOSIS — M05.79 SEROPOSITIVE RHEUMATOID ARTHRITIS OF MULTIPLE SITES (HCC): Primary | ICD-10-CM

## 2024-05-22 PROCEDURE — 96413 CHEMO IV INFUSION 1 HR: CPT

## 2024-05-22 PROCEDURE — 96415 CHEMO IV INFUSION ADDL HR: CPT

## 2024-05-22 RX ORDER — DIPHENHYDRAMINE HCL 25 MG
25 CAPSULE ORAL ONCE
Status: COMPLETED | OUTPATIENT
Start: 2024-05-22 | End: 2024-05-22

## 2024-05-22 RX ORDER — DIPHENHYDRAMINE HCL 25 MG
CAPSULE ORAL
Status: COMPLETED
Start: 2024-05-22 | End: 2024-05-22

## 2024-05-22 RX ORDER — ACETAMINOPHEN 325 MG/1
650 TABLET ORAL EVERY 6 HOURS PRN
OUTPATIENT
Start: 2024-07-17

## 2024-05-22 RX ORDER — ACETAMINOPHEN 325 MG/1
650 TABLET ORAL ONCE
OUTPATIENT
Start: 2024-07-17

## 2024-05-22 RX ORDER — ACETAMINOPHEN 325 MG/1
TABLET ORAL
Status: COMPLETED
Start: 2024-05-22 | End: 2024-05-22

## 2024-05-22 RX ORDER — ACETAMINOPHEN 325 MG/1
650 TABLET ORAL ONCE
Status: COMPLETED | OUTPATIENT
Start: 2024-05-22 | End: 2024-05-22

## 2024-05-22 RX ORDER — DIPHENHYDRAMINE HCL 25 MG
25 CAPSULE ORAL ONCE
OUTPATIENT
Start: 2024-07-17

## 2024-05-22 RX ADMIN — ACETAMINOPHEN 650 MG: 325 TABLET ORAL at 10:30:00

## 2024-05-22 RX ADMIN — DIPHENHYDRAMINE HCL 25 MG: 25 MG CAPSULE ORAL at 10:29:00

## 2024-05-22 NOTE — PROGRESS NOTES
Patient here for Remicade infusion. Patient denies any issues or concerns. Pre medications Tylenol and Benadryl administered per orders.     Ordering MD: Nagi Romano  Order Exp: 3 doses remaining in order     Patient tolerated infusion without difficulty or complaint. Post infusion VSS. Reviewed next apt date/time: 7/17 11:30am    Education Record  Learner:  Patient  Disease / Diagnosis: Rheumatoid Arthritis  Barriers / Limitations:  None  Method:  Reinforcement  General Topics:  Medication and Plan of care reviewed  Outcome:  Shows understanding

## 2024-05-27 RX ORDER — FUROSEMIDE 20 MG/1
20 TABLET ORAL DAILY
Qty: 90 TABLET | Refills: 0 | Status: SHIPPED | OUTPATIENT
Start: 2024-05-27

## 2024-06-02 DIAGNOSIS — E78.5 DYSLIPIDEMIA: ICD-10-CM

## 2024-06-02 RX ORDER — ATORVASTATIN CALCIUM 40 MG/1
TABLET, FILM COATED ORAL
Qty: 90 TABLET | Refills: 0 | Status: SHIPPED | OUTPATIENT
Start: 2024-06-02

## 2024-06-12 ENCOUNTER — OFFICE VISIT (OUTPATIENT)
Dept: NEPHROLOGY | Facility: CLINIC | Age: 74
End: 2024-06-12
Payer: MEDICARE

## 2024-06-12 VITALS
SYSTOLIC BLOOD PRESSURE: 145 MMHG | BODY MASS INDEX: 51 KG/M2 | HEART RATE: 85 BPM | DIASTOLIC BLOOD PRESSURE: 91 MMHG | WEIGHT: 293 LBS

## 2024-06-12 DIAGNOSIS — N18.32 STAGE 3B CHRONIC KIDNEY DISEASE (HCC): Primary | ICD-10-CM

## 2024-06-12 PROCEDURE — 99214 OFFICE O/P EST MOD 30 MIN: CPT | Performed by: INTERNAL MEDICINE

## 2024-06-12 RX ORDER — AMLODIPINE BESYLATE 5 MG/1
5 TABLET ORAL DAILY
COMMUNITY

## 2024-06-13 RX ORDER — TERAZOSIN 10 MG/1
CAPSULE ORAL
Qty: 90 CAPSULE | Refills: 0 | Status: SHIPPED | OUTPATIENT
Start: 2024-06-13

## 2024-06-13 NOTE — PROGRESS NOTES
06/13/24        Patient: Fatimah Coffey   YOB: 1950   Date of Visit: 6/12/2024       Dear  Dr. Eric MD,      Thank you for referring Fatimah Coffey to my practice.  Please find my assessment and plan below.      As you know she is a 73-year-old female with a history of adult onset diabetes mellitus followed by Dr. Rodríguez, rheumatoid arthritis, anemia chronic disease, obesity, MGUS followed by hematology, hypercholesterolemia, sleep apnea and hypertension who I now had the pleasure of seeing for follow-up of chronic kidney disease stage III.    Overall the patient states she is feeling okay without any chest pain, shortness of breath, GI or urinary tract symptoms but she knows that her blood sugars have been running too high.  She has been having some difficulty getting some of her medications.  Now on Ozempic.  Amlodipine was recently added for blood pressure control.       On physical exam her blood pressure is 145/91 with a pulse of 85 and she weighed 334 pounds.  Her neck was supple without JVD.  Lungs were clear.  Heart revealed a regular rate and rhythm with an S4 but no gallops, murmurs or rubs.  Abdomen was soft, flat, nontender without organomegaly, masses or bruits.  Extremities revealed o edema.     I reviewed her most recent labs done on May 6, 2024.  Creatinine is creeping up to 1.64 now with an estimated GFR 33 cc/min.  Hemoglobin 13.8.  Albumin 3.8.  Urine microalbumin to creatinine ratio was normal at 25 but her A1c was 10.2.    I therefore informed the patient that there has been some deterioration in renal function.  Reinforced the importance of both blood pressure and blood sugar control.  She will continue to work with Dr. Rodríguez.  Check blood pressures daily and call me in 1 week.  Adjust blood pressure medications if indicated.  Maintain adequate hydration.  Avoid nonsteroidals.  Repeat CBC and renal panel in 2 months.  Will see again in 6 months or sooner if  clinically indicated.    Thank you again for allowing me to participate in the care of your patient.  If you have any questions please feel free to call.             Sincerely,   Matthew Bassett MD   [facility], [facility], [place]  [address] [address]    Document electronically generated by:  Matthew Bassett MD

## 2024-06-13 NOTE — PATIENT INSTRUCTIONS
Check your blood pressures daily and call me in 1 week.  Continue to work with Dr. Rodríguez regarding your A1c and blood sugar control.  Repeat your kidney blood test in 2 months.  Orders are in the computer.

## 2024-06-21 RX ORDER — OMEPRAZOLE 20 MG/1
CAPSULE, DELAYED RELEASE ORAL
Qty: 90 CAPSULE | Refills: 0 | Status: SHIPPED | OUTPATIENT
Start: 2024-06-21

## 2024-06-21 RX ORDER — LANCETS
EACH MISCELLANEOUS
Qty: 102 EACH | Refills: 0 | Status: SHIPPED | OUTPATIENT
Start: 2024-06-21

## 2024-06-24 RX ORDER — PEN NEEDLE, DIABETIC 32GX 5/32"
NEEDLE, DISPOSABLE MISCELLANEOUS
Qty: 100 EACH | Refills: 0 | Status: SHIPPED | OUTPATIENT
Start: 2024-06-24

## 2024-07-17 ENCOUNTER — OFFICE VISIT (OUTPATIENT)
Dept: HEMATOLOGY/ONCOLOGY | Facility: HOSPITAL | Age: 74
End: 2024-07-17
Attending: STUDENT IN AN ORGANIZED HEALTH CARE EDUCATION/TRAINING PROGRAM
Payer: MEDICARE

## 2024-07-17 VITALS
WEIGHT: 293 LBS | TEMPERATURE: 98 F | HEART RATE: 86 BPM | SYSTOLIC BLOOD PRESSURE: 141 MMHG | OXYGEN SATURATION: 95 % | BODY MASS INDEX: 44.41 KG/M2 | DIASTOLIC BLOOD PRESSURE: 70 MMHG | HEIGHT: 68 IN | RESPIRATION RATE: 18 BRPM

## 2024-07-17 DIAGNOSIS — M05.79 SEROPOSITIVE RHEUMATOID ARTHRITIS OF MULTIPLE SITES (HCC): Primary | ICD-10-CM

## 2024-07-17 PROCEDURE — 96413 CHEMO IV INFUSION 1 HR: CPT

## 2024-07-17 PROCEDURE — 96415 CHEMO IV INFUSION ADDL HR: CPT

## 2024-07-17 RX ORDER — ACETAMINOPHEN 325 MG/1
TABLET ORAL
Status: COMPLETED
Start: 2024-07-17 | End: 2024-07-17

## 2024-07-17 RX ORDER — ACETAMINOPHEN 325 MG/1
650 TABLET ORAL ONCE
OUTPATIENT
Start: 2024-09-11

## 2024-07-17 RX ORDER — ACETAMINOPHEN 325 MG/1
650 TABLET ORAL ONCE
Status: COMPLETED | OUTPATIENT
Start: 2024-07-17 | End: 2024-07-17

## 2024-07-17 RX ORDER — DIPHENHYDRAMINE HCL 25 MG
25 CAPSULE ORAL ONCE
Status: COMPLETED | OUTPATIENT
Start: 2024-07-17 | End: 2024-07-17

## 2024-07-17 RX ORDER — DIPHENHYDRAMINE HCL 25 MG
25 CAPSULE ORAL ONCE
OUTPATIENT
Start: 2024-09-11

## 2024-07-17 RX ORDER — ACETAMINOPHEN 325 MG/1
650 TABLET ORAL EVERY 6 HOURS PRN
OUTPATIENT
Start: 2024-09-11

## 2024-07-17 RX ORDER — DIPHENHYDRAMINE HCL 25 MG
CAPSULE ORAL
Status: COMPLETED
Start: 2024-07-17 | End: 2024-07-17

## 2024-07-17 RX ADMIN — ACETAMINOPHEN 650 MG: 325 TABLET ORAL at 11:55:00

## 2024-07-17 RX ADMIN — DIPHENHYDRAMINE HCL 25 MG: 25 MG CAPSULE ORAL at 11:55:00

## 2024-07-17 NOTE — PROGRESS NOTES
Pt here for Remicade . Pt denies any issues or concerns. Fatimah states that she is feeling well.     Ordering MD: Juan Antonio  Order Exp: 1/22/25     Pt tolerated infusion without difficulty or complaint. Reviewed next apt date/time: 9/11 at 1000  AVS printed and provided to Fatimah      Education Record  Learner:  Patient  Disease / Diagnosis: RA  Barriers / Limitations:  None  Method:  Brief focused and Discussion  General Topics:  Plan of care reviewed  Outcome:  Shows understanding

## 2024-08-06 RX ORDER — FOLIC ACID 1 MG/1
1000 TABLET ORAL DAILY
Qty: 90 TABLET | Refills: 0 | Status: SHIPPED | OUTPATIENT
Start: 2024-08-06

## 2024-08-15 ENCOUNTER — HOSPITAL ENCOUNTER (OUTPATIENT)
Dept: BONE DENSITY | Facility: HOSPITAL | Age: 74
Discharge: HOME | End: 2024-08-15
Attending: INTERNAL MEDICINE
Payer: MEDICARE

## 2024-08-15 DIAGNOSIS — S42.409D CLOSED FRACTURE OF ELBOW WITH ROUTINE HEALING, UNSPECIFIED LATERALITY, SUBSEQUENT ENCOUNTER: ICD-10-CM

## 2024-08-15 DIAGNOSIS — E13.40 OTHER SPECIFIED DIABETES MELLITUS WITH DIABETIC NEUROPATHY, UNSPECIFIED WHETHER LONG TERM INSULIN USE (HCC): ICD-10-CM

## 2024-08-15 DIAGNOSIS — N18.9 CHRONIC KIDNEY DISEASE, UNSPECIFIED CKD STAGE: ICD-10-CM

## 2024-08-15 DIAGNOSIS — M06.4 INFLAMMATORY POLYARTHRITIS (HCC): ICD-10-CM

## 2024-08-15 DIAGNOSIS — M05.79 RHEUMATOID ARTHRITIS INVOLVING MULTIPLE SITES WITH POSITIVE RHEUMATOID FACTOR (HCC): ICD-10-CM

## 2024-08-15 PROCEDURE — 77080 DXA BONE DENSITY AXIAL: CPT | Performed by: INTERNAL MEDICINE

## 2024-08-16 ENCOUNTER — TELEPHONE (OUTPATIENT)
Dept: RHEUMATOLOGY | Facility: CLINIC | Age: 74
End: 2024-08-16

## 2024-08-16 NOTE — TELEPHONE ENCOUNTER
Noted.      Future Appointments   Date Time Provider Department Center   9/11/2024 10:00 AM EM CC INFRN 2 [facility] CHEMO EMO   9/26/2024  4:00 PM Nagi Romano DO [facility]   10/3/2024  1:15 PM Andrew Cox, DO [facility] HEM ONC EMO

## 2024-08-16 NOTE — TELEPHONE ENCOUNTER
Hello-can someone please reach out to the patient?  She is due for follow-up.  I would like to discuss her methotrexate dosing given her kidney function.  Thank you.

## 2024-08-31 DIAGNOSIS — E78.5 DYSLIPIDEMIA: ICD-10-CM

## 2024-08-31 RX ORDER — ATORVASTATIN CALCIUM 40 MG/1
TABLET, FILM COATED ORAL
Qty: 90 TABLET | Refills: 0 | Status: SHIPPED | OUTPATIENT
Start: 2024-08-31

## 2024-09-03 RX ORDER — TERAZOSIN 10 MG/1
CAPSULE ORAL
Qty: 90 CAPSULE | Refills: 0 | Status: SHIPPED | OUTPATIENT
Start: 2024-09-03

## 2024-09-11 ENCOUNTER — OFFICE VISIT (OUTPATIENT)
Dept: HEMATOLOGY/ONCOLOGY | Facility: HOSPITAL | Age: 74
End: 2024-09-11
Attending: STUDENT IN AN ORGANIZED HEALTH CARE EDUCATION/TRAINING PROGRAM
Payer: MEDICARE

## 2024-09-11 VITALS
OXYGEN SATURATION: 95 % | SYSTOLIC BLOOD PRESSURE: 132 MMHG | HEART RATE: 76 BPM | WEIGHT: 293 LBS | BODY MASS INDEX: 50 KG/M2 | TEMPERATURE: 99 F | DIASTOLIC BLOOD PRESSURE: 77 MMHG | RESPIRATION RATE: 18 BRPM

## 2024-09-11 DIAGNOSIS — M05.79 SEROPOSITIVE RHEUMATOID ARTHRITIS OF MULTIPLE SITES (HCC): Primary | ICD-10-CM

## 2024-09-11 PROCEDURE — 96415 CHEMO IV INFUSION ADDL HR: CPT

## 2024-09-11 PROCEDURE — 96413 CHEMO IV INFUSION 1 HR: CPT

## 2024-09-11 RX ORDER — ACETAMINOPHEN 325 MG/1
TABLET ORAL
Status: COMPLETED
Start: 2024-09-11 | End: 2024-09-11

## 2024-09-11 RX ORDER — DIPHENHYDRAMINE HCL 25 MG
CAPSULE ORAL
Status: COMPLETED
Start: 2024-09-11 | End: 2024-09-11

## 2024-09-11 RX ORDER — ACETAMINOPHEN 325 MG/1
650 TABLET ORAL ONCE
OUTPATIENT
Start: 2024-11-06

## 2024-09-11 RX ORDER — ACETAMINOPHEN 325 MG/1
650 TABLET ORAL EVERY 6 HOURS PRN
OUTPATIENT
Start: 2024-11-06

## 2024-09-11 RX ORDER — DIPHENHYDRAMINE HCL 25 MG
25 CAPSULE ORAL ONCE
Status: COMPLETED | OUTPATIENT
Start: 2024-09-11 | End: 2024-09-11

## 2024-09-11 RX ORDER — ACETAMINOPHEN 325 MG/1
650 TABLET ORAL ONCE
Status: COMPLETED | OUTPATIENT
Start: 2024-09-11 | End: 2024-09-11

## 2024-09-11 RX ORDER — DIPHENHYDRAMINE HCL 25 MG
25 CAPSULE ORAL ONCE
OUTPATIENT
Start: 2024-11-06

## 2024-09-11 RX ADMIN — ACETAMINOPHEN 650 MG: 325 TABLET ORAL at 10:15:00

## 2024-09-11 RX ADMIN — DIPHENHYDRAMINE HCL 25 MG: 25 MG CAPSULE ORAL at 10:16:00

## 2024-09-11 NOTE — PROGRESS NOTES
Patient here for Remicade. Patient denies any issues or concern, denies active infections. Tylenol and Benadryl administered per orders.     Ordering Provider: Nagi Romano DO  Order Exp: after next dose     Patient tolerated infusion without difficulty or complaint. No s/s of adverse reaction noted, post infusion VSS. Reviewed next apt date/time: 11/6 10:30am, 9/26 with Dr. Romano. Printed AVS provided.    Education Record  Learner:  Patient  Disease / Diagnosis: rheumatoid arthritis  Barriers / Limitations:  None  Method:  Reinforcement  General Topics:  Medication and Plan of care reviewed  Outcome:  Shows understanding

## 2024-09-23 ENCOUNTER — LAB ENCOUNTER (OUTPATIENT)
Dept: LAB | Facility: HOSPITAL | Age: 74
End: 2024-09-23
Attending: INTERNAL MEDICINE
Payer: MEDICARE

## 2024-09-23 DIAGNOSIS — Z79.899 ENCOUNTER FOR LONG-TERM (CURRENT) USE OF HIGH-RISK MEDICATION: ICD-10-CM

## 2024-09-23 DIAGNOSIS — N18.32 STAGE 3B CHRONIC KIDNEY DISEASE (HCC): ICD-10-CM

## 2024-09-23 DIAGNOSIS — D47.2 MGUS (MONOCLONAL GAMMOPATHY OF UNKNOWN SIGNIFICANCE): ICD-10-CM

## 2024-09-23 DIAGNOSIS — E78.2 MIXED HYPERLIPIDEMIA: Primary | ICD-10-CM

## 2024-09-23 DIAGNOSIS — E66.09 EXOGENOUS OBESITY: ICD-10-CM

## 2024-09-23 DIAGNOSIS — E11.65 INADEQUATELY CONTROLLED DIABETES MELLITUS (HCC): ICD-10-CM

## 2024-09-23 DIAGNOSIS — I10 ESSENTIAL HYPERTENSION, MALIGNANT: ICD-10-CM

## 2024-09-23 LAB
ALBUMIN SERPL-MCNC: 4 G/DL (ref 3.2–4.8)
ALBUMIN/GLOB SERPL: 0.9 {RATIO} (ref 1–2)
ALP LIVER SERPL-CCNC: 79 U/L
ALT SERPL-CCNC: 24 U/L
ANION GAP SERPL CALC-SCNC: 6 MMOL/L (ref 0–18)
AST SERPL-CCNC: 26 U/L (ref ?–34)
BASOPHILS # BLD AUTO: 0.05 X10(3) UL (ref 0–0.2)
BASOPHILS NFR BLD AUTO: 0.8 %
BILIRUB SERPL-MCNC: 0.7 MG/DL (ref 0.2–1.1)
BUN BLD-MCNC: 22 MG/DL (ref 9–23)
BUN/CREAT SERPL: 14.3 (ref 10–20)
CALCIUM BLD-MCNC: 9.8 MG/DL (ref 8.7–10.4)
CHLORIDE SERPL-SCNC: 106 MMOL/L (ref 98–112)
CO2 SERPL-SCNC: 25 MMOL/L (ref 21–32)
CORTIS SERPL-MCNC: 2.3 UG/DL
CREAT BLD-MCNC: 1.54 MG/DL
CRP SERPL-MCNC: <0.4 MG/DL (ref ?–1)
DEPRECATED RDW RBC AUTO: 49.1 FL (ref 35.1–46.3)
EGFRCR SERPLBLD CKD-EPI 2021: 36 ML/MIN/1.73M2 (ref 60–?)
EOSINOPHIL # BLD AUTO: 0.07 X10(3) UL (ref 0–0.7)
EOSINOPHIL NFR BLD AUTO: 1.2 %
ERYTHROCYTE [DISTWIDTH] IN BLOOD BY AUTOMATED COUNT: 14.2 % (ref 11–15)
ERYTHROCYTE [SEDIMENTATION RATE] IN BLOOD: 84 MM/HR
EST. AVERAGE GLUCOSE BLD GHB EST-MCNC: 214 MG/DL (ref 68–126)
FASTING STATUS PATIENT QL REPORTED: YES
GLOBULIN PLAS-MCNC: 4.5 G/DL (ref 2–3.5)
GLUCOSE BLD-MCNC: 222 MG/DL (ref 70–99)
HBA1C MFR BLD: 9.1 % (ref ?–5.7)
HCT VFR BLD AUTO: 39.4 %
HGB BLD-MCNC: 12.8 G/DL
IGA SERPL-MCNC: 371.7 MG/DL (ref 40–350)
IGM SERPL-MCNC: 277.3 MG/DL (ref 50–300)
IMM GRANULOCYTES # BLD AUTO: 0.01 X10(3) UL (ref 0–1)
IMM GRANULOCYTES NFR BLD: 0.2 %
IMMUNOGLOBULIN PNL SER-MCNC: 2593 MG/DL (ref 650–1600)
LYMPHOCYTES # BLD AUTO: 2.46 X10(3) UL (ref 1–4)
LYMPHOCYTES NFR BLD AUTO: 41.3 %
MCH RBC QN AUTO: 31.3 PG (ref 26–34)
MCHC RBC AUTO-ENTMCNC: 32.5 G/DL (ref 31–37)
MCV RBC AUTO: 96.3 FL
MONOCYTES # BLD AUTO: 0.36 X10(3) UL (ref 0.1–1)
MONOCYTES NFR BLD AUTO: 6.1 %
NEUTROPHILS # BLD AUTO: 3 X10 (3) UL (ref 1.5–7.7)
NEUTROPHILS # BLD AUTO: 3 X10(3) UL (ref 1.5–7.7)
NEUTROPHILS NFR BLD AUTO: 50.4 %
OSMOLALITY SERPL CALC.SUM OF ELEC: 294 MOSM/KG (ref 275–295)
PHOSPHATE SERPL-MCNC: 3.5 MG/DL (ref 2.4–5.1)
PLATELET # BLD AUTO: 231 10(3)UL (ref 150–450)
POTASSIUM SERPL-SCNC: 4.1 MMOL/L (ref 3.5–5.1)
PROT SERPL-MCNC: 8.5 G/DL (ref 5.7–8.2)
RBC # BLD AUTO: 4.09 X10(6)UL
SODIUM SERPL-SCNC: 137 MMOL/L (ref 136–145)
WBC # BLD AUTO: 6 X10(3) UL (ref 4–11)

## 2024-09-23 PROCEDURE — 85025 COMPLETE CBC W/AUTO DIFF WBC: CPT

## 2024-09-23 PROCEDURE — 84165 PROTEIN E-PHORESIS SERUM: CPT

## 2024-09-23 PROCEDURE — 85652 RBC SED RATE AUTOMATED: CPT

## 2024-09-23 PROCEDURE — 86334 IMMUNOFIX E-PHORESIS SERUM: CPT

## 2024-09-23 PROCEDURE — 36415 COLL VENOUS BLD VENIPUNCTURE: CPT

## 2024-09-23 PROCEDURE — 83521 IG LIGHT CHAINS FREE EACH: CPT

## 2024-09-23 PROCEDURE — 80299 QUANTITATIVE ASSAY DRUG: CPT

## 2024-09-23 PROCEDURE — 82533 TOTAL CORTISOL: CPT

## 2024-09-23 PROCEDURE — 84100 ASSAY OF PHOSPHORUS: CPT

## 2024-09-23 PROCEDURE — 86140 C-REACTIVE PROTEIN: CPT

## 2024-09-23 PROCEDURE — 80053 COMPREHEN METABOLIC PANEL: CPT

## 2024-09-23 PROCEDURE — 83036 HEMOGLOBIN GLYCOSYLATED A1C: CPT

## 2024-09-23 PROCEDURE — 82784 ASSAY IGA/IGD/IGG/IGM EACH: CPT

## 2024-09-24 ENCOUNTER — TELEPHONE (OUTPATIENT)
Dept: NEPHROLOGY | Facility: CLINIC | Age: 74
End: 2024-09-24

## 2024-09-24 DIAGNOSIS — N18.32 STAGE 3B CHRONIC KIDNEY DISEASE (HCC): Primary | ICD-10-CM

## 2024-09-24 LAB
KAPPA LC FREE SER-MCNC: 11.66 MG/DL (ref 0.33–1.94)
KAPPA LC FREE/LAMBDA FREE SER NEPH: 2 {RATIO} (ref 0.26–1.65)
LAMBDA LC FREE SERPL-MCNC: 5.84 MG/DL (ref 0.57–2.63)

## 2024-09-26 ENCOUNTER — OFFICE VISIT (OUTPATIENT)
Dept: RHEUMATOLOGY | Facility: CLINIC | Age: 74
End: 2024-09-26
Payer: MEDICARE

## 2024-09-26 VITALS
WEIGHT: 293 LBS | BODY MASS INDEX: 44.41 KG/M2 | HEART RATE: 91 BPM | DIASTOLIC BLOOD PRESSURE: 67 MMHG | HEIGHT: 68 IN | RESPIRATION RATE: 20 BRPM | SYSTOLIC BLOOD PRESSURE: 109 MMHG

## 2024-09-26 DIAGNOSIS — Z79.899 ENCOUNTER FOR LONG-TERM (CURRENT) USE OF HIGH-RISK MEDICATION: ICD-10-CM

## 2024-09-26 DIAGNOSIS — N18.9 CHRONIC KIDNEY DISEASE, UNSPECIFIED CKD STAGE: ICD-10-CM

## 2024-09-26 DIAGNOSIS — S42.409D CLOSED FRACTURE OF ELBOW WITH ROUTINE HEALING, UNSPECIFIED LATERALITY, SUBSEQUENT ENCOUNTER: ICD-10-CM

## 2024-09-26 DIAGNOSIS — E13.40 OTHER SPECIFIED DIABETES MELLITUS WITH DIABETIC NEUROPATHY, UNSPECIFIED WHETHER LONG TERM INSULIN USE (HCC): ICD-10-CM

## 2024-09-26 DIAGNOSIS — M05.79 RHEUMATOID ARTHRITIS INVOLVING MULTIPLE SITES WITH POSITIVE RHEUMATOID FACTOR (HCC): Primary | ICD-10-CM

## 2024-09-26 DIAGNOSIS — M06.4 INFLAMMATORY POLYARTHRITIS (HCC): ICD-10-CM

## 2024-09-26 DIAGNOSIS — D47.2 MGUS (MONOCLONAL GAMMOPATHY OF UNKNOWN SIGNIFICANCE): ICD-10-CM

## 2024-09-26 PROBLEM — E66.01 SEVERE OBESITY (BMI >= 40) (HCC): Chronic | Status: ACTIVE | Noted: 2024-09-26

## 2024-09-26 PROBLEM — J41.0 SMOKERS' COUGH (HCC): Chronic | Status: ACTIVE | Noted: 2024-09-26

## 2024-09-26 PROCEDURE — 99214 OFFICE O/P EST MOD 30 MIN: CPT | Performed by: INTERNAL MEDICINE

## 2024-09-26 PROCEDURE — G2211 COMPLEX E/M VISIT ADD ON: HCPCS | Performed by: INTERNAL MEDICINE

## 2024-09-26 RX ORDER — METHOTREXATE 2.5 MG/1
7.5 TABLET ORAL WEEKLY
Qty: 36 TABLET | Refills: 1 | Status: SHIPPED | OUTPATIENT
Start: 2024-09-26

## 2024-09-26 NOTE — PROGRESS NOTES
RHEUMATOLOGY CLINIC- FOLLOW UP    Fatimah Coffey is a 73 year old female.    ASSESSMENT/PLAN:       ICD-10-CM    1. Rheumatoid arthritis involving multiple sites with positive rheumatoid factor ([facility])  M05.79 methotrexate 2.5 MG Oral Tab     ALT(SGPT)     AST (SGOT)     CBC With Differential With Platelet     C-Reactive Protein     Creatinine, Serum     Sed Rate, Westergren (Automated)      2. Inflammatory polyarthritis ([facility])  M06.4 methotrexate 2.5 MG Oral Tab     ALT(SGPT)     AST (SGOT)     CBC With Differential With Platelet     C-Reactive Protein     Creatinine, Serum     Sed Rate, Westergren (Automated)      3. MGUS (monoclonal gammopathy of unknown significance)  D47.2 methotrexate 2.5 MG Oral Tab     ALT(SGPT)     AST (SGOT)     CBC With Differential With Platelet     C-Reactive Protein     Creatinine, Serum     Sed Rate, Westergren (Automated)    Hematology/oncology: Low risk MGUS.  No need for BMBx or PET/CT      4. Chronic kidney disease, unspecified CKD stage  N18.9 methotrexate 2.5 MG Oral Tab     ALT(SGPT)     AST (SGOT)     CBC With Differential With Platelet     C-Reactive Protein     Creatinine, Serum     Sed Rate, Westergren (Automated)      5. Other specified diabetes mellitus with diabetic neuropathy, unspecified whether long term insulin use ([facility])  E13.40 methotrexate 2.5 MG Oral Tab     ALT(SGPT)     AST (SGOT)     CBC With Differential With Platelet     C-Reactive Protein     Creatinine, Serum     Sed Rate, Westergren (Automated)      6. Closed fracture of elbow with routine healing, unspecified laterality, subsequent encounter  S42.409D methotrexate 2.5 MG Oral Tab     ALT(SGPT)     AST (SGOT)     CBC With Differential With Platelet     C-Reactive Protein     Creatinine, Serum     Sed Rate, Westergren (Automated)      7. Encounter for long-term (current) use of high-risk medication  Z79.899 methotrexate 2.5 MG Oral Tab     ALT(SGPT)     AST (SGOT)     CBC With Differential With Platelet      C-Reactive Protein     Creatinine, Serum     Sed Rate, Westergren (Automated)              DISCUSSION: Patient presents for f/u of longstanding rheumatoid arthritis currently well-controlled on long-term Remicade, methotrexate, and folic acid without noted side effect.  Additionally, on exam, patient without synovitis.  Therefore, discussed with patient weaning methotrexate given relatively quiescent disease especially in the setting of CKD.        PLAN:  - Decrease methotrexate as follows: methotrexate 2.5 mg tablets x 3 tablets once weekly with folic acid 1 mg daily  - Continue Remicade q8w with premedications of Benadryl.    - DEXA normal.  Repeat in 2 years.    - Consult/evaluation communicated with referring physician/provider.    Patient to repeat labs in about 3 months (around December) which I will MyChart.  Otherwise, patient will follow-up in 6 with repeat labs drawn prior.     Nagi Romano,   2024   10:58 AM  There is a longitudinal care relationship with me, the care plan reflects the ongoing nature of the continuous relationship of care, and the medical record indicates that there is ongoing treatment of a serious/complex medical condition which I am currently managing.  is Applicable.         Discussed with patient that they are on chronic treatment with a high-risk medication that requires close lab monitoring for possible drug toxicity.  Additionally, discussed with patient give the possible immunosuppressive effects of their medication as well as their underlying hyper-inflammatory state, the importance of keeping vaccinations and age-appropriate screenings up-to-date, given increased risk of complications and malignancies seen in these patient populations.  Patient to f/u with repeat labs drawn prior (standing labs ordered).  Last QuantiFERON TB testin24  Last Hepatitis testin24        SUBJECTIVE:     24 Follow-Up:   Patient doing overall well on methotrexate weekly  with Remicade.  Arthralgias tolerable at this time.      Current Medications:  Folic acid 1 mg  Methotrexate 2.5 mg tablets x 4 tablets once weekly- added to Remicade         -Formerly on 6 tabs qw, however, decreased to 4 tabs qw given anemia    Remicade q8w(start 2001)     Medication History:  No other Dmards/Immunologics    Interval History:     1/4/24 Initial Consult: I had the pleasure of seeing Fatimah CABRALES Coffey on 1/4/2024 as a new outpatient consultation for RA. The patient was originally referred by her PCP.     73 year old female w/ PMH RA, MGUS (follows w/ Heme/Onc: Dr. Cox), HTN, HLD CKD, left knee arthroplasty, DM c/b neuropathy, GERD, elbow fracture from ground-level who presents to clinic today.    Of note, patient formally following with Dr. Ayers ([facility]) but had moved to the [facility] more recently.  She has been treated for her rheumatoid arthritis since early 2000's-for starting with Remicade and methotrexate added on sometime later.  She presented with severe polyarthralgias as well as \"lumps\" along her hands.  Similarly, her flares would present as arthralgias.  However, since being on Remicade, folic acid, and methotrexate her disease has been relatively quiescent.  She denies current pain and rates it as a 0/10.  She only has a few minutes of morning stiffness and is hoping to get it back into water aerobics as this seemed to improve her stiffness.  Methotrexate, Remicade, folic acid.  ROS negative at this time for fever, chills, rash, photosensitivity, Raynaud's phenomena, sicca symptoms, GI upset, mucositis, oral/nasal ulcers.    Additionally, last DEXA reviewed, as below, which was normal no noted side effects from.  She states that a couple of years prior, she had fallen while on a snow bank to the floor, fracturing her elbow.    HISTORY:  Past Medical History:    Arthritis    Rhumatoid    Bursitis    Chronic kidney disease (CKD)    COVID-19    Diabetes (HCC)    Diabetes  mellitus (HCC)    Essential hypertension    Hyperlipidemia    Kidney disease      Social Hx Reviewed   Family Hx Reviewed     Medications (Active prior to today's visit):  Current Outpatient Medications   Medication Sig Dispense Refill    semaglutide 2 MG/3ML Subcutaneous Solution Pen-injector Inject 0.5 mg into the skin once a week.      amLODIPine 5 MG Oral Tab Take 1 tablet (5 mg total) by mouth daily.      Magnesium Oxide Does not apply Powder Take by mouth daily.      aspirin 81 MG Oral Tab EC Take 1 tablet (81 mg total) by mouth daily.      atorvastatin 40 MG Oral Tab Take 1 tablet (40 mg total) by mouth daily.      ezetimibe 10 MG Oral Tab Take 1 tablet (10 mg total) by mouth daily.      omeprazole 20 MG Oral Capsule Delayed Release Take 1 capsule (20 mg total) by mouth every morning. 90 capsule 3    sodium chloride 0.9 % SOLN with inFLIXimab 100 MG SOLR 5 mg/kg Inject into the vein.      methotrexate 2.5 MG Oral Tab Take 4 tablets (10 mg total) by mouth once a week.      glimepiride 4 MG Oral Tab Take by mouth every morning before breakfast.        Metoprolol Succinate ER 50 MG Oral Tablet 24 Hr Take 1 tablet (50 mg total) by mouth in the morning and 1 tablet (50 mg total) before bedtime.      folic acid 1 MG Oral Tab Take 1 tablet (1 mg total) by mouth daily.      Terazosin HCl 10 MG Oral Cap Take 1 capsule (10 mg total) by mouth nightly.      Glucose Blood (ONETOUCH ULTRA BLUE) In Vitro Strip TEST BID  3    furosemide 20 MG Oral Tab TK 1 T PO QAM  0    Valsartan-Hydrochlorothiazide 320-12.5 MG Oral Tab TK 1 T PO D  0    ACCU-CHEK MULTICLIX LANCETS Does not apply Misc          Allergies:  No Known Allergies      ROS:   Review of Systems   Constitutional:  Negative for chills and fever.   HENT:  Negative for congestion, hearing loss, mouth sores, nosebleeds and trouble swallowing.    Eyes:  Negative for photophobia, pain, redness and visual disturbance.   Respiratory:  Negative for cough and shortness of  breath.    Cardiovascular:  Negative for chest pain, palpitations and leg swelling.   Gastrointestinal:  Negative for abdominal pain, blood in stool, diarrhea and nausea.   Endocrine: Negative for cold intolerance and heat intolerance.   Genitourinary:  Negative for dysuria, frequency and hematuria.   Musculoskeletal:  Positive for arthralgias (occasional). Negative for back pain, gait problem, joint swelling, myalgias, neck pain and neck stiffness.   Skin:  Negative for color change and rash.   Neurological:  Negative for dizziness, weakness, numbness and headaches.   Psychiatric/Behavioral:  Negative for confusion and dysphoric mood.         PHYSICAL EXAM:     Constitutional:  Well developed, Well nourished, No acute distress  HENT:  Normocephalic, Atraumatic, Bilateral external ears normal, Oropharynx moist, No oral exudates.  Neck: Normal range of motion, No tenderness, Supple, No stridor.  Eyes:  PERRL, EOMI, Conjunctiva normal, No discharge.  Respiratory:  Normal breath sounds, No respiratory distress, No wheezing.  Cardiovascular:  Normal heart rate, Normal rhythm, No murmurs, No rubs, No gallops.  GI:  Bowel sounds normal, Soft, No tenderness, No masses, No pulsatile masses.  : No CVA tenderness.  Musculoskeletal:  A comprehensive 28 count joint exam was done and was negative for swelling or tenderness except as noted. Inspections for misalignment, asymmetry, crepitation, defects, tenderness, masses, nodules, effusions, range of motion, and stability in the upper and lower extremities bilaterally are all normal unless noted.           Integument:  Warm, Dry, No erythema, No rash.  Lymphatic:  No lymphadenopathy noted.  Neurologic:  Alert & oriented x 3, Normal motor function, Normal sensory function, No focal deficits noted.  Psychiatric:  Affect normal, Judgment normal, Mood normal.    LABS:     Prior lab work reviewed and notable for:    9/23/2024:  BUN 22, CR 1.54, LFTs not elevated  CR stable  CBC with  normal WBC/Hgb/PLT  CRP less than 0.4 WNL, ESR 84 (high)    1/5/2024:  Acute hepatitis panel and quant TB testing negative  CRP less than 0.4 WNL, ESR 50 (high)  CMP with BUN 16, CR 1.49 (high), LFTs nonelevated  CBC with normal WBC/Hgb/PLT     (high),  (high)    9/11/2023  CBC without cytopenias and no leukocytosis  CMP with elevated CR 1.53, BUN 20, LFTs not elevated but gamma gap noted    SPEP:  Component  Ref Range & Units 9/11/23 11:24 AM   Total Protein  6.4 - 8.2 g/dL 8.5 High    Albumin  3.75 - 5.21 g/dL 3.77   Alpha-1-Globulins  0.19 - 0.46 g/dL 0.27   Alpha-2-Globulins  0.48 - 1.05 g/dL 0.69   Beta Globulins  0.68 - 1.23 g/dL 1.42 High    Gamma Globulins  0.62 - 1.70 g/dL 2.35 High    Albumin/Globulin Ratio  1.00 - 2.00 0.80 Low    M-Jonah  <=0.00 g/dL 0.23 High    SPE Interpretation   Serum protein electrophoresis demonstrates increased total protein, increased beta region protein, increased gamma region protein, the presence of an abnormal peak/band in the beta region identified as IgG lambda monoclonal protein by capillary immunotyping (This monoclonal protein cannot be quantitated due to procedural limitations associated with monoclonal protein being in the beta region) and the presence of a small abnormal peak/band in the gamma region identified as IgM kappa monoclonal protein by capillary immunotyping. This small monoclonal protein is present within a background of polyclonal protein in the gamma region.    These gamma region findings were also identified in a previous serum specimen from 10/18/2022.    Currently measured serum immunoglobulins demonstrate elevated serum IgG, IgA, and IgM.    Increased beta globulins may be seen in liver and lipid disorders, certain monoclonal gammopathies, and non-fasting specimens.    Polyclonal gammopathies may be seen in association with chronic liver or lung diseases, connective tissue diseases, chronic infection and miscellaneous conditions  (metastatic carcinoma, cystic fibrosis, recovery from thermal burns, etc.).    Clinically, a history of monoclonal gammopathy of undetermined significance (MGUS) is present in the patient's Epic EMR.  Close clinical follow-up is recommended.     2023:  ESR 42 (high)  CRP 0.4 WNL    Hemoglobin A1c 9.0    2022  TSH 2.185 WNL    2022:  24-hour urine Bence Kenney Protein: Negative for free monoclonal light chains  Imagin/15/24 DEXA:   FINDINGS:     LEFT FEMORAL NECK  BMD: 0.911 gm/sq. cm. T SCORE: -0.3 Z SCORE: 1.2     LEFT TOTAL HIP  BMD: 0.862 gm/sq. cm. T SCORE: -1.1 Z SCORE: 0.1     PA LUMBAR SPINE (L1 - L4)  BMD: 1.223 gm/sq. cm. T SCORE: 0.7 Z SCORE: 3.1        T scores are a comparison to sex-matched patients with mean peak bone mass and are given in standard deviation (s.d.).  Each 1 s.d. corresponds to approximately 10% below peak normal bone density.       WORLD HEALTH ORGANIZATION CRITERIA  NORMAL T SCORE: Above -1 s.d.    OSTEOPENIA T SCORE: Between -1 and -2.5 s.d.    OSTEOPOROSIS T SCORE: -2.5 s.d.     National Osteoporosis Foundation Clinician's Guide to Prevention and Treatment of Osteoporosis recommendations for treatment:  Post menopausal women and men age 50 and older presenting with the following should be considered for treatment:  * A hip or vertebral (clinical or morphometric) fracture  * T score < -2.5 at the femoral neck or spine after appropriate evaluation to exclude secondary causes.  * Low bone mass (T score between -1.0 and -2.5 at the femoral neck or spine) and a 10-year probability of a hip fracture > 3% or a 10-year probability of a major osteoporosis-related fracture > 20% based on the [place]-adapted WHO algorithm               Impression   CONCLUSION:  1. Findings in the left femoral neck are in the normal range, and there is no increased fracture risk.  There has been decrease in the BMD by 9.1% from previous study.  Findings in the total left hip suggest osteopenia,  there may be increased fracture  risk.  There has been decrease in the BMD by 11.6% from previous study.  The 10 year fracture risk for major osteoporotic fracture is 2.6%, and for hip fracture is 0.1%.  2. Findings in the total AP lumbar spine are in the normal range, and there is no increased fracture risk.  There has been increase in the BMD by 4.5% from previous study.     5/13/23 US Head/Neck:   Impression   CONCLUSION:     Patient's palpable abnormality in the left neck corresponds to a prominent but nonenlarged 0.8 cm short axis cervical lymph node adjacent to the submandibular gland.  As this node demonstrates a preserved central echogenic hilum, a benign/reactive  etiology is favored.  If, however, the node persists or enlarges despite conservative treatment, consider further evaluation with a contrast enhanced neck CT.        2/9/2022 right elbow XR:    Impression   CONCLUSION:     Acute posterior dislocation of the elbow joint with small avulsion fracture fragments in the empty olecranon fossa.  Probable impacted radial neck fracture, difficult to discern due to the level of bony overlap from the dislocation.     There is extensive soft tissue swelling and a probable joint effusion.           Impression   CONCLUSION:     * Single lateral view of the elbow was obtained.  * The elbow appears anatomic position alignment in this 1 projection.  * Displaced fractures of the radial head/neck, coronoid process and possibly the tip of the olecranon noted.  * Olecranon spur is seen.  * There is soft tissue swelling.  * A preliminary report was submitted and there is agreement without major discrepancies.       2/1/20 Hip XR:       Impression   CONCLUSION:  1. Moderate osteoarthritis left hip.       3/12/20 DEXA:     FINDINGS:             LEFT FEMORAL NECK               BMD:    1.003 gm/sq. cm.            T SCORE:         1.4        Z SCORE:         1.7     LEFT TOTAL HIP               BMD:    0.975 gm/sq. cm.             T SCORE:         0.3        Z SCORE:         0.7     PA LUMBAR SPINE (L1 - L4)               BMD:    1.170 gm/sq. cm.            T SCORE:         1.1        Z SCORE:         2.3                     T scores are a comparison to sex-matched patients with mean peak bone mass and are given in standard deviation (s.d.).  Each 1 s.d. corresponds to approximately 10% below peak normal bone density.       WORLD HEALTH ORGANIZATION CRITERIA  NORMAL T SCORE:      Above -1 s.d.    OSTEOPENIA T SCORE:           Between -1 and -2.5 s.d.    OSTEOPOROSIS T SCORE:     -2.5 s.d.     National Osteoporosis Foundation Clinician's Guide to Prevention and Treatment of Osteoporosis recommendations for treatment:  Post menopausal women and men age 50 and older presenting with the following should be considered for treatment:  * A hip or vertebral (clinical or morphometric) fracture  * T score < -2.5 at the femoral neck or spine after appropriate evaluation to exclude secondary causes.  * Low bone mass (T score between -1.0 and -2.5 at the femoral neck or spine) and a 10-year probability of a hip fracture > 3% or a 10-year probability of a major osteoporosis-related fracture > 20% based on the [place]-adapted WHO algorithm              =====  CONCLUSION:   1. Normal bone density left hip and lumbar spine.

## 2024-10-02 ENCOUNTER — TELEPHONE (OUTPATIENT)
Dept: NEPHROLOGY | Facility: CLINIC | Age: 74
End: 2024-10-02

## 2024-10-02 NOTE — TELEPHONE ENCOUNTER
Your kidney function remains stable.  If doing well just repeat your kidney blood test in 3 months and then see me for follow-up.  Orders updated in the computer.  Please call with any questions or concerns.

## 2024-10-02 NOTE — TELEPHONE ENCOUNTER
Notified patient of Dr. Bassett  test result message.  Patient will later to Scheduled appointment as recommended.   Patient voiced understanding.

## 2024-10-03 ENCOUNTER — APPOINTMENT (OUTPATIENT)
Dept: HEMATOLOGY/ONCOLOGY | Facility: HOSPITAL | Age: 74
End: 2024-10-03
Attending: STUDENT IN AN ORGANIZED HEALTH CARE EDUCATION/TRAINING PROGRAM
Payer: MEDICARE

## 2024-10-04 LAB
ALBUMIN SERPL ELPH-MCNC: 3.64 G/DL (ref 3.75–5.21)
ALBUMIN/GLOB SERPL: 0.81 {RATIO} (ref 1–2)
ALPHA1 GLOB SERPL ELPH-MCNC: 0.27 G/DL (ref 0.19–0.46)
ALPHA2 GLOB SERPL ELPH-MCNC: 0.71 G/DL (ref 0.48–1.05)
B-GLOBULIN SERPL ELPH-MCNC: 1.35 G/DL (ref 0.68–1.23)
GAMMA GLOB SERPL ELPH-MCNC: 2.13 G/DL (ref 0.62–1.7)
M PROTEIN 1 SERPL ELPH-MCNC: 0.23 G/DL (ref ?–0)
PROT SERPL-MCNC: 8.1 G/DL (ref 5.7–8.2)

## 2024-10-05 LAB — DEXAMETHASONE, SERUM: 291 NG/DL

## 2024-10-07 ENCOUNTER — OFFICE VISIT (OUTPATIENT)
Dept: HEMATOLOGY/ONCOLOGY | Facility: HOSPITAL | Age: 74
End: 2024-10-07
Attending: STUDENT IN AN ORGANIZED HEALTH CARE EDUCATION/TRAINING PROGRAM
Payer: MEDICARE

## 2024-10-07 VITALS
DIASTOLIC BLOOD PRESSURE: 69 MMHG | HEIGHT: 68 IN | SYSTOLIC BLOOD PRESSURE: 128 MMHG | WEIGHT: 293 LBS | BODY MASS INDEX: 44.41 KG/M2 | TEMPERATURE: 98 F | OXYGEN SATURATION: 96 % | RESPIRATION RATE: 20 BRPM | HEART RATE: 80 BPM

## 2024-10-07 DIAGNOSIS — D47.2 MGUS (MONOCLONAL GAMMOPATHY OF UNKNOWN SIGNIFICANCE): Primary | ICD-10-CM

## 2024-10-07 DIAGNOSIS — M05.79 SEROPOSITIVE RHEUMATOID ARTHRITIS OF MULTIPLE SITES (HCC): ICD-10-CM

## 2024-10-07 PROCEDURE — 99214 OFFICE O/P EST MOD 30 MIN: CPT | Performed by: STUDENT IN AN ORGANIZED HEALTH CARE EDUCATION/TRAINING PROGRAM

## 2024-10-07 RX ORDER — INSULIN GLARGINE 300 U/ML
INJECTION, SOLUTION SUBCUTANEOUS
COMMUNITY

## 2024-10-07 NOTE — PROGRESS NOTES
[facility]  Hematology Progress Note    Patient Name: Fatimah Coffey   YOB: 1950   Medical Record Number: [id number]    Chief Complaint: MGUS    Subjective:   Fatimah Coffey is a 73 year old female with a history of adult onset diabetes mellitus, rheumatoid arthritis, anemia of chronic disease, obesity, hypercholesterolemia, sleep apnea, HTN, CKD3 that presents for follow-up regarding MGUS.    Interval history:  Overall, the patient continues to do well.  She is in good spirits with no major medical concerns or complaints today.     Review of Systems:  Hematology/Oncology ROS performed and negative except as above in HPI    History/Other:   Current Medications:   Insulin Glargine, 1 Unit Dial, (TOUJEO SOLOSTAR) 300 UNIT/ML Subcutaneous Solution Pen-injector 80 units Subcutaneous daily for 90 days      methotrexate 2.5 MG Oral Tab Take 3 tablets (7.5 mg total) by mouth once a week. 36 tablet 1    semaglutide 2 MG/3ML Subcutaneous Solution Pen-injector Inject 0.5 mg into the skin once a week.      amLODIPine 5 MG Oral Tab Take 1 tablet (5 mg total) by mouth daily.      Magnesium Oxide Does not apply Powder Take by mouth daily.      aspirin 81 MG Oral Tab EC Take 1 tablet (81 mg total) by mouth daily.      atorvastatin 40 MG Oral Tab Take 1 tablet (40 mg total) by mouth daily.      ezetimibe 10 MG Oral Tab Take 1 tablet (10 mg total) by mouth daily.      omeprazole 20 MG Oral Capsule Delayed Release Take 1 capsule (20 mg total) by mouth every morning. 90 capsule 3    sodium chloride 0.9 % SOLN with inFLIXimab 100 MG SOLR 5 mg/kg Inject into the vein.      glimepiride 4 MG Oral Tab Take by mouth every morning before breakfast.        Metoprolol Succinate ER 50 MG Oral Tablet 24 Hr Take 1 tablet (50 mg total) by mouth in the morning and 1 tablet (50 mg total) before bedtime.      folic acid 1 MG Oral Tab Take 1 tablet (1 mg total) by mouth daily.      Terazosin HCl 10 MG Oral Cap Take  1 capsule (10 mg total) by mouth nightly.      Glucose Blood (ONETOUCH ULTRA BLUE) In Vitro Strip TEST BID  3    furosemide 20 MG Oral Tab TK 1 T PO QAM  0    Valsartan-Hydrochlorothiazide 320-12.5 MG Oral Tab TK 1 T PO D  0    ACCU-CHEK MULTICLIX LANCETS Does not apply Misc      Allergies:   No Known Allergies    Objective:   Blood pressure 128/69, pulse 80, temperature 98.1 °F (36.7 °C), temperature source Oral, resp. rate 20, height 1.727 m (5' 8\"), weight (!) 150.6 kg (332 lb), SpO2 96%.  Physical Exam:  ECOG PS: 0  General: A&Ox3, NAD  HEENT: PERRL  CV: RRR  Pulm: normal effort  Extremities: no edema  Neurological: grossly intact    Results:   Labs:  Lab Results   Component Value Date    WBC 6.0 09/23/2024    HGB 12.8 09/23/2024    HCT 39.4 09/23/2024    .0 09/23/2024    CREATSERUM 1.54 (H) 09/23/2024    BUN 22 09/23/2024     09/23/2024    K 4.1 09/23/2024     09/23/2024    CO2 25.0 09/23/2024     (H) 09/23/2024    CA 9.8 09/23/2024    ALB 4.0 09/23/2024    ALB 3.64 (L) 09/23/2024    ALKPHO 79 09/23/2024    BILT 0.7 09/23/2024    TP 8.5 (H) 09/23/2024    TP 8.1 09/23/2024    AST 26 09/23/2024    ALT 24 09/23/2024    ESRML 84 (H) 09/23/2024    CRP <0.40 09/23/2024    PHOS 3.5 09/23/2024         Assessment & Plan:   Fatimah Coffey is a 73 year old female with a history of adult onset diabetes mellitus, rheumatoid arthritis, anemia of chronic disease, obesity, hypercholesterolemia, sleep apnea, HTN, CKD3 that presents for follow-up regarding MGUS.    MGUS labs are stable at this time, stable M protein at 0.23, free light chain ratio is stable.  We will see her back in 1 year and she is always welcome back sooner should the need arise.    MDM: Celso Cox DO    [facility]  [place] [facility]    This note was created using a voice-recognition transcribing system. Incorrect words or phrases may have been missed during proofreading. Please  interpret accordingly.

## 2024-10-10 RX ORDER — PEN NEEDLE, DIABETIC 32GX 5/32"
NEEDLE, DISPOSABLE MISCELLANEOUS
Qty: 100 EACH | Refills: 0 | Status: SHIPPED | OUTPATIENT
Start: 2024-10-10

## 2024-11-06 ENCOUNTER — OFFICE VISIT (OUTPATIENT)
Dept: HEMATOLOGY/ONCOLOGY | Facility: HOSPITAL | Age: 74
End: 2024-11-06
Attending: STUDENT IN AN ORGANIZED HEALTH CARE EDUCATION/TRAINING PROGRAM
Payer: MEDICARE

## 2024-11-06 VITALS
TEMPERATURE: 99 F | WEIGHT: 293 LBS | HEIGHT: 68 IN | BODY MASS INDEX: 44.41 KG/M2 | SYSTOLIC BLOOD PRESSURE: 148 MMHG | HEART RATE: 80 BPM | DIASTOLIC BLOOD PRESSURE: 75 MMHG | OXYGEN SATURATION: 97 % | RESPIRATION RATE: 20 BRPM

## 2024-11-06 DIAGNOSIS — M05.79 SEROPOSITIVE RHEUMATOID ARTHRITIS OF MULTIPLE SITES (HCC): Primary | ICD-10-CM

## 2024-11-06 PROCEDURE — 96415 CHEMO IV INFUSION ADDL HR: CPT

## 2024-11-06 PROCEDURE — 96413 CHEMO IV INFUSION 1 HR: CPT

## 2024-11-06 RX ORDER — DIPHENHYDRAMINE HCL 25 MG
CAPSULE ORAL
Status: COMPLETED
Start: 2024-11-06 | End: 2024-11-06

## 2024-11-06 RX ORDER — DIPHENHYDRAMINE HCL 25 MG
25 CAPSULE ORAL ONCE
Status: CANCELLED | OUTPATIENT
Start: 2024-11-06

## 2024-11-06 RX ORDER — ACETAMINOPHEN 325 MG/1
650 TABLET ORAL ONCE
Status: CANCELLED | OUTPATIENT
Start: 2024-11-06

## 2024-11-06 RX ORDER — ACETAMINOPHEN 325 MG/1
650 TABLET ORAL EVERY 6 HOURS PRN
OUTPATIENT
Start: 2024-11-06

## 2024-11-06 RX ORDER — ACETAMINOPHEN 325 MG/1
650 TABLET ORAL ONCE
Status: COMPLETED | OUTPATIENT
Start: 2024-11-06 | End: 2024-11-06

## 2024-11-06 RX ORDER — ACETAMINOPHEN 325 MG/1
TABLET ORAL
Status: COMPLETED
Start: 2024-11-06 | End: 2024-11-06

## 2024-11-06 RX ORDER — DIPHENHYDRAMINE HCL 25 MG
25 CAPSULE ORAL ONCE
Status: COMPLETED | OUTPATIENT
Start: 2024-11-06 | End: 2024-11-06

## 2024-11-06 RX ADMIN — DIPHENHYDRAMINE HCL 25 MG: 25 MG CAPSULE ORAL at 11:12:00

## 2024-11-06 RX ADMIN — ACETAMINOPHEN 650 MG: 325 TABLET ORAL at 11:12:00

## 2024-11-06 NOTE — PROGRESS NOTES
Pt here for Remicade . Pt denies any issues or concerns.      Ordering Provider: Brooke CUEVAS MD  Order Exp: 01/22/25     Pt tolerated infusion without difficulty or complaint. Reviewed next apt date/time:     Education Record  Learner:  Patient  Disease / Diagnosis: Rheumatoid Arthritis  Barriers / Limitations:  None  Method:  Reinforcement  General Topics:  Plan of care reviewed  Outcome:  Shows understanding    Patient discharged in stable condition.

## 2024-11-11 RX ORDER — LANCETS
EACH MISCELLANEOUS
Qty: 102 EACH | Refills: 0 | Status: SHIPPED | OUTPATIENT
Start: 2024-11-11

## 2024-11-11 RX ORDER — FOLIC ACID 1 MG/1
1000 TABLET ORAL DAILY
Qty: 90 TABLET | Refills: 0 | Status: SHIPPED | OUTPATIENT
Start: 2024-11-11

## 2024-11-12 SDOH — ECONOMIC STABILITY: HOUSING INSECURITY: DO YOU HAVE PROBLEMS WITH ANY OF THE FOLLOWING?: NONE OF THE ABOVE

## 2024-11-12 SDOH — ECONOMIC STABILITY: FOOD INSECURITY: WITHIN THE PAST 12 MONTHS, THE FOOD YOU BOUGHT JUST DIDN'T LAST AND YOU DIDN'T HAVE MONEY TO GET MORE.: NEVER TRUE

## 2024-11-12 SDOH — ECONOMIC STABILITY: GENERAL: WOULD YOU LIKE HELP WITH ANY OF THE FOLLOWING NEEDS?: I DON'T WANT HELP WITH ANY OF THESE

## 2024-11-12 SDOH — ECONOMIC STABILITY: HOUSING INSECURITY: WHAT IS YOUR LIVING SITUATION TODAY?: I HAVE A STEADY PLACE TO LIVE

## 2024-11-12 SDOH — ECONOMIC STABILITY: TRANSPORTATION INSECURITY
IN THE PAST 12 MONTHS, HAS LACK OF RELIABLE TRANSPORTATION KEPT YOU FROM MEDICAL APPOINTMENTS, MEETINGS, WORK OR FROM GETTING THINGS NEEDED FOR DAILY LIVING?: NO

## 2024-11-12 ASSESSMENT — SOCIAL DETERMINANTS OF HEALTH (SDOH): IN THE PAST 12 MONTHS, HAS THE ELECTRIC, GAS, OIL, OR WATER COMPANY THREATENED TO SHUT OFF SERVICE IN YOUR HOME?: NO

## 2024-11-12 ASSESSMENT — PATIENT HEALTH QUESTIONNAIRE - PHQ9
CLINICAL INTERPRETATION OF PHQ2 SCORE: 0
2. FEELING DOWN, DEPRESSED OR HOPELESS: NOT AT ALL
CLINICAL INTERPRETATION OF PHQ2 SCORE: NO FURTHER SCREENING NEEDED
SUM OF ALL RESPONSES TO PHQ9 QUESTIONS 1 AND 2: 0
1. LITTLE INTEREST OR PLEASURE IN DOING THINGS: NOT AT ALL

## 2024-11-19 ENCOUNTER — APPOINTMENT (OUTPATIENT)
Dept: FAMILY MEDICINE | Age: 74
End: 2024-11-19

## 2024-11-19 VITALS
SYSTOLIC BLOOD PRESSURE: 133 MMHG | BODY MASS INDEX: 44.41 KG/M2 | HEART RATE: 71 BPM | TEMPERATURE: 98.4 F | DIASTOLIC BLOOD PRESSURE: 84 MMHG | WEIGHT: 293 LBS | HEIGHT: 68 IN | OXYGEN SATURATION: 99 % | RESPIRATION RATE: 20 BRPM

## 2024-11-19 DIAGNOSIS — E78.5 HYPERLIPIDEMIA, UNSPECIFIED HYPERLIPIDEMIA TYPE: ICD-10-CM

## 2024-11-19 DIAGNOSIS — M05.711 RHEUMATOID ARTHRITIS INVOLVING RIGHT SHOULDER WITH POSITIVE RHEUMATOID FACTOR  (CMD): ICD-10-CM

## 2024-11-19 DIAGNOSIS — I10 BENIGN ESSENTIAL HYPERTENSION: ICD-10-CM

## 2024-11-19 DIAGNOSIS — Z12.11 COLON CANCER SCREENING: ICD-10-CM

## 2024-11-19 DIAGNOSIS — Z00.00 MEDICARE ANNUAL WELLNESS VISIT, SUBSEQUENT: Primary | ICD-10-CM

## 2024-11-19 DIAGNOSIS — Z00.00 ANNUAL PHYSICAL EXAM: ICD-10-CM

## 2024-11-19 DIAGNOSIS — Z13.820 OSTEOPOROSIS SCREENING: ICD-10-CM

## 2024-11-19 DIAGNOSIS — Z12.31 ENCOUNTER FOR SCREENING MAMMOGRAM FOR MALIGNANT NEOPLASM OF BREAST: ICD-10-CM

## 2024-11-19 DIAGNOSIS — E66.01 OBESITY, MORBID, BMI 50 OR HIGHER  (CMD): ICD-10-CM

## 2024-11-19 DIAGNOSIS — R92.30 DENSE BREASTS: ICD-10-CM

## 2024-11-19 DIAGNOSIS — D47.2 MGUS (MONOCLONAL GAMMOPATHY OF UNKNOWN SIGNIFICANCE): ICD-10-CM

## 2024-11-19 DIAGNOSIS — N18.30 DIABETES MELLITUS WITH STAGE 3 CHRONIC KIDNEY DISEASE  (CMD): ICD-10-CM

## 2024-11-19 DIAGNOSIS — E11.22 DIABETES MELLITUS WITH STAGE 3 CHRONIC KIDNEY DISEASE  (CMD): ICD-10-CM

## 2024-11-19 DIAGNOSIS — N18.32 STAGE 3B CHRONIC KIDNEY DISEASE  (CMD): ICD-10-CM

## 2024-11-19 DIAGNOSIS — G47.33 OSA ON CPAP: ICD-10-CM

## 2024-11-19 RX ORDER — SEMAGLUTIDE 1.34 MG/ML
INJECTION, SOLUTION SUBCUTANEOUS
COMMUNITY
Start: 2024-05-12

## 2024-11-19 ASSESSMENT — ACTIVITIES OF DAILY LIVING (ADL)
USING TRANSPORTATION: INDEPENDENT
USING TELEPHONE: INDEPENDENT
SENSORY_SUPPORT_DEVICES: EYEGLASSES
NEEDS ASSISTANCE WITH FOOD: INDEPENDENT
ADL_SHORT_OF_BREATH: NO
ADL_SCORE: 12
ADL_SHORT_OF_BREATH: NO
DOING HOUSEWORK: INDEPENDENT
GROCERY SHOPPING: INDEPENDENT
TAKING MEDICATION: INDEPENDENT
PREPARING MEALS: INDEPENDENT
STIL DRIVING: YES
EATING: INDEPENDENT
ADL_BEFORE_ADMISSION: INDEPENDENT
ADL_SCORE: 12
NEEDS_ASSIST: NO
ADL_BEFORE_ADMISSION: INDEPENDENT
RECENT_DECLINE_ADL: NO
PILL BOX USED: NO
MANAGING FINANCES: INDEPENDENT
RECENT_DECLINE_ADL: NO

## 2024-11-19 ASSESSMENT — PATIENT HEALTH QUESTIONNAIRE - PHQ9
1. LITTLE INTEREST OR PLEASURE IN DOING THINGS: NOT AT ALL
7. TROUBLE CONCENTRATING ON THINGS, SUCH AS READING THE NEWSPAPER OR WATCHING TELEVISION: NOT AT ALL
SUM OF ALL RESPONSES TO PHQ QUESTIONS 1-9: 0
8. MOVING OR SPEAKING SO SLOWLY THAT OTHER PEOPLE COULD HAVE NOTICED. OR THE OPPOSITE, BEING SO FIGETY OR RESTLESS THAT YOU HAVE BEEN MOVING AROUND A LOT MORE THAN USUAL: NOT AT ALL
SUM OF ALL RESPONSES TO PHQ9 QUESTIONS 1 AND 2: 0
5. POOR APPETITE, WEIGHT LOSS, OR OVEREATING: NOT AT ALL
2. FEELING DOWN, DEPRESSED OR HOPELESS: NOT AT ALL
CLINICAL INTERPRETATION OF PHQ2 SCORE: NO FURTHER SCREENING NEEDED
SUM OF ALL RESPONSES TO PHQ9 QUESTIONS 1 AND 2: 0
4. FEELING TIRED OR HAVING LITTLE ENERGY: NOT AT ALL
10. IF YOU CHECKED OFF ANY PROBLEMS, HOW DIFFICULT HAVE THESE PROBLEMS MADE IT FOR YOU TO DO YOUR WORK, TAKE CARE OF THINGS AT HOME, OR GET ALONG WITH OTHER PEOPLE: NOT DIFFICULT AT ALL
3. TROUBLE FALLING OR STAYING ASLEEP OR SLEEPING TOO MUCH: NOT AT ALL
9. THOUGHTS THAT YOU WOULD BE BETTER OFF DEAD, OR OF HURTING YOURSELF: NOT AT ALL
6. FEELING BAD ABOUT YOURSELF - OR THAT YOU ARE A FAILURE OR HAVE LET YOURSELF OR YOUR FAMILY DOWN: NOT AT ALL

## 2024-11-19 ASSESSMENT — COGNITIVE AND FUNCTIONAL STATUS - GENERAL
BECAUSE OF A PHYSICAL, MENTAL, OR EMOTIONAL CONDITION, DO YOU HAVE SERIOUS DIFFICULTY CONCENTRATING, REMEMBERING OR MAKING DECISIONS: NO
DO YOU HAVE DIFFICULTY DRESSING OR BATHING: NO
BECAUSE OF A PHYSICAL, MENTAL, OR EMOTIONAL CONDITION, DO YOU HAVE DIFFICULTY DOING ERRANDS ALONE: NO
DO YOU HAVE SERIOUS DIFFICULTY WALKING OR CLIMBING STAIRS: NO

## 2024-11-19 ASSESSMENT — PAIN SCALES - GENERAL: PAINLEVEL: 0

## 2024-11-19 ASSESSMENT — MINI COG
PATIENT WAS GIVEN REPEAT BACK WORDS FROM VERSION: 1 - BANANA SUNRISE CHAIR
PATIENT ABLE TO REPEAT THE 3 WORDS GIVEN PREVIOUSLY?: WAS ABLE TO REPEAT BACK 3 WORDS CORRECTLY
TOTAL SCORE: 5
PATIENT ABLE TO FILL IN THE CLOCK FACE WITH 10 MINUTES PAST 11 O'CLOCK?: YES, CLOCK IS CORRECT

## 2024-11-26 PROBLEM — Z20.822 SUSPECTED COVID-19 VIRUS INFECTION: Status: RESOLVED | Noted: 2021-01-12 | Resolved: 2024-11-26

## 2024-11-27 RX ORDER — FUROSEMIDE 20 MG/1
20 TABLET ORAL DAILY
Qty: 90 TABLET | Refills: 0 | Status: SHIPPED | OUTPATIENT
Start: 2024-11-27

## 2024-11-29 DIAGNOSIS — E78.5 DYSLIPIDEMIA: ICD-10-CM

## 2024-12-01 RX ORDER — ATORVASTATIN CALCIUM 40 MG/1
TABLET, FILM COATED ORAL
Qty: 90 TABLET | Refills: 0 | Status: SHIPPED | OUTPATIENT
Start: 2024-12-01

## 2024-12-03 RX ORDER — TERAZOSIN 10 MG/1
CAPSULE ORAL
Qty: 90 CAPSULE | Refills: 0 | Status: SHIPPED | OUTPATIENT
Start: 2024-12-03

## 2024-12-06 ENCOUNTER — E-ADVICE (OUTPATIENT)
Dept: FAMILY MEDICINE | Age: 74
End: 2024-12-06

## 2024-12-06 DIAGNOSIS — N18.30 DIABETES MELLITUS WITH STAGE 3 CHRONIC KIDNEY DISEASE  (CMD): Primary | ICD-10-CM

## 2024-12-06 DIAGNOSIS — E11.22 DIABETES MELLITUS WITH STAGE 3 CHRONIC KIDNEY DISEASE  (CMD): Primary | ICD-10-CM

## 2024-12-09 RX ORDER — SEMAGLUTIDE 1.34 MG/ML
1 INJECTION, SOLUTION SUBCUTANEOUS
Qty: 3 ML | Refills: 3 | Status: SHIPPED | OUTPATIENT
Start: 2024-12-09

## 2024-12-16 DIAGNOSIS — E13.40 OTHER SPECIFIED DIABETES MELLITUS WITH DIABETIC NEUROPATHY, UNSPECIFIED WHETHER LONG TERM INSULIN USE (HCC): ICD-10-CM

## 2024-12-16 DIAGNOSIS — M06.4 INFLAMMATORY POLYARTHRITIS (HCC): ICD-10-CM

## 2024-12-16 DIAGNOSIS — N18.9 CHRONIC KIDNEY DISEASE, UNSPECIFIED CKD STAGE: ICD-10-CM

## 2024-12-16 DIAGNOSIS — M05.79 RHEUMATOID ARTHRITIS INVOLVING MULTIPLE SITES WITH POSITIVE RHEUMATOID FACTOR (HCC): ICD-10-CM

## 2024-12-16 DIAGNOSIS — S42.409D CLOSED FRACTURE OF ELBOW WITH ROUTINE HEALING, UNSPECIFIED LATERALITY, SUBSEQUENT ENCOUNTER: ICD-10-CM

## 2024-12-16 DIAGNOSIS — Z79.899 ENCOUNTER FOR LONG-TERM (CURRENT) USE OF HIGH-RISK MEDICATION: ICD-10-CM

## 2024-12-16 DIAGNOSIS — D47.2 MGUS (MONOCLONAL GAMMOPATHY OF UNKNOWN SIGNIFICANCE): ICD-10-CM

## 2024-12-16 RX ORDER — METHOTREXATE 2.5 MG/1
7.5 TABLET ORAL WEEKLY
Qty: 36 TABLET | Refills: 1 | Status: SHIPPED | OUTPATIENT
Start: 2024-12-16

## 2024-12-30 RX ORDER — PEN NEEDLE, DIABETIC 32GX 5/32"
NEEDLE, DISPOSABLE MISCELLANEOUS
Qty: 100 EACH | Refills: 0 | Status: SHIPPED | OUTPATIENT
Start: 2024-12-30

## 2024-12-31 ENCOUNTER — TELEPHONE (OUTPATIENT)
Dept: RHEUMATOLOGY | Facility: CLINIC | Age: 74
End: 2024-12-31

## 2024-12-31 ENCOUNTER — TELEPHONE (OUTPATIENT)
Age: 74
End: 2024-12-31

## 2024-12-31 RX ORDER — ACETAMINOPHEN 325 MG/1
650 TABLET ORAL EVERY 6 HOURS PRN
Status: CANCELLED | OUTPATIENT
Start: 2024-12-31

## 2024-12-31 RX ORDER — DIPHENHYDRAMINE HYDROCHLORIDE 50 MG/ML
12.5 INJECTION INTRAMUSCULAR; INTRAVENOUS ONCE
Status: CANCELLED | OUTPATIENT
Start: 2024-12-31

## 2024-12-31 RX ORDER — ACETAMINOPHEN 325 MG/1
650 TABLET ORAL ONCE
Status: CANCELLED | OUTPATIENT
Start: 2024-12-31

## 2024-12-31 NOTE — TELEPHONE ENCOUNTER
Patient called and asked for a call back from the doctor's office once the order is in her chart.

## 2024-12-31 NOTE — TELEPHONE ENCOUNTER
Deepa/[facility] calling for orders for Infliximab/Remicade from Dr. Romano.     Please send via fax [phone].    The patient's appointment is Jan 2, 2025. Please send prior to appointment.     If any questions, please phone the clinic at [phone].

## 2024-12-31 NOTE — TELEPHONE ENCOUNTER
Remicade 700 mg q8h      Premeds: Tylenol 650 mg tab. Frequency: Once PRN, 30 minutes prior to infliximab infusion (if not taken at  home). May also be given once as needed during infusion for achiness, headache, or fever if not given prior to  infusion.        IV Benadryl 12.5, once     Do they also need infusion reaction meds ordered?     Infusion Reaction Meds  - albuterol (PROVENTIL) nebulizer solution 0.083%   Dose: 2.5 mg Route: Nebulization Frequency: PRN for shortness of breath/wheezing  -diphenhydrAMINE (BENADRYL) injectable   Dose: 25 mg Route: Intravenous Frequency: Once PRN, May repeat x1 for urticaria, pruritis, shortness  of breath. May repeat in 15 minutes if symptoms not resolved.  -EPINEPHrine (Epi-Pen) 0.3 mg/0.3 mL IM Auto-Injector   Dose: 0.3 mg Route: Intramuscular Frequency: Once PRN for anaphylaxis. Inject into lateral thigh and   hold for 10 seconds. Massage the injected area. Use for patients weighing greater than 27.3 kg (60 lbs).   Use amp and 1.5 inch needle for patients with BMI greater than 30. Notify physician if administered.  - MethylPREDNISolone Sod Succ (PF) Inj 125 mg (SOLU-Medrol PF).   Dose : 125 mg Route : IV push Frequency: Once PRN for hypersensitivity reaction. Notify MD if administered.    Thank you

## 2024-12-31 NOTE — TELEPHONE ENCOUNTER
Previous authorization valid 12/18/24-01/18/25 for change in vendor to [employer].  Notified pt that she can keep appt on 1/2/25 but will require an updated prior auth prior to the next dose.  Thanks for getting the orders put back in so quickly today

## 2024-12-31 NOTE — TELEPHONE ENCOUNTER
Called the office of Dr Romano and spoke with Marilee regarding needing an order for patient. Informed her patient is having infusion on 1-2-25 and we would need new order before patient infusion otherwise we would have to reschedule her appointment. Marilee states she is sending request over to Dr Romano.

## 2025-01-02 ENCOUNTER — OFFICE VISIT (OUTPATIENT)
Age: 75
End: 2025-01-02
Attending: STUDENT IN AN ORGANIZED HEALTH CARE EDUCATION/TRAINING PROGRAM
Payer: MEDICARE

## 2025-01-02 ENCOUNTER — TELEPHONE (OUTPATIENT)
Dept: RHEUMATOLOGY | Facility: CLINIC | Age: 75
End: 2025-01-02

## 2025-01-02 VITALS
RESPIRATION RATE: 18 BRPM | HEART RATE: 81 BPM | DIASTOLIC BLOOD PRESSURE: 68 MMHG | TEMPERATURE: 98 F | BODY MASS INDEX: 44.41 KG/M2 | HEIGHT: 68 IN | OXYGEN SATURATION: 97 % | WEIGHT: 293 LBS | SYSTOLIC BLOOD PRESSURE: 126 MMHG

## 2025-01-02 DIAGNOSIS — M05.79 SEROPOSITIVE RHEUMATOID ARTHRITIS OF MULTIPLE SITES (HCC): Primary | ICD-10-CM

## 2025-01-02 RX ORDER — DIPHENHYDRAMINE HYDROCHLORIDE 50 MG/ML
INJECTION INTRAMUSCULAR; INTRAVENOUS
Status: COMPLETED
Start: 2025-01-02 | End: 2025-01-02

## 2025-01-02 RX ORDER — ACETAMINOPHEN 325 MG/1
650 TABLET ORAL EVERY 6 HOURS PRN
Status: CANCELLED | OUTPATIENT
Start: 2025-02-27

## 2025-01-02 RX ORDER — DIPHENHYDRAMINE HYDROCHLORIDE 50 MG/ML
12.5 INJECTION INTRAMUSCULAR; INTRAVENOUS ONCE
Status: COMPLETED | OUTPATIENT
Start: 2025-01-02 | End: 2025-01-02

## 2025-01-02 RX ORDER — DIPHENHYDRAMINE HYDROCHLORIDE 50 MG/ML
12.5 INJECTION INTRAMUSCULAR; INTRAVENOUS ONCE
Status: CANCELLED | OUTPATIENT
Start: 2025-02-27

## 2025-01-02 RX ORDER — ACETAMINOPHEN 325 MG/1
650 TABLET ORAL ONCE
Status: CANCELLED | OUTPATIENT
Start: 2025-02-27

## 2025-01-02 RX ORDER — ACETAMINOPHEN 325 MG/1
TABLET ORAL
Status: COMPLETED
Start: 2025-01-02 | End: 2025-01-02

## 2025-01-02 RX ORDER — ACETAMINOPHEN 325 MG/1
650 TABLET ORAL ONCE
Status: COMPLETED | OUTPATIENT
Start: 2025-01-02 | End: 2025-01-02

## 2025-01-02 RX ADMIN — DIPHENHYDRAMINE HYDROCHLORIDE 12.5 MG: 50 INJECTION INTRAMUSCULAR; INTRAVENOUS at 11:04:00

## 2025-01-02 RX ADMIN — ACETAMINOPHEN 650 MG: 325 TABLET ORAL at 11:03:00

## 2025-01-02 NOTE — TELEPHONE ENCOUNTER
Patient said she was told she needs prior authorization for Rx infliximab. Patient is not able to schedule next visit until prior authorization is complete. Please advise             Current Outpatient Medications   Medication Sig Dispense Refill    sodium chloride 0.9 % SOLN with inFLIXimab 100 MG SOLR 5 mg/kg Inject into the vein.

## 2025-01-02 NOTE — PROGRESS NOTES
Patient arrives to infusion for Remicade . Patient denies any issues or concerns. Premedications tylenol and benadryl administered per orders.     Ordering Provider: Juan Antonio  Order Exp: 5/6 orders remaining     Patient appeared to tolerate infusion without difficulty or complaint. No s/s of adverse reaction noted. Post infusion VSS.    Reviewed next apt date/time: pt Auth expires 1/18/25 notified pt to call ordering provider to renew it and schedule next Remicade appointment 8 weeks from today on 2/27.    Education Record  Learner:  Patient  Disease / Diagnosis: RA  Barriers / Limitations:  None  Method:  Discussion  General Topics:  Plan of care reviewed  Outcome:  Shows understanding

## 2025-01-06 RX ORDER — DIPHENHYDRAMINE HYDROCHLORIDE 50 MG/ML
12.5 INJECTION INTRAMUSCULAR; INTRAVENOUS ONCE
OUTPATIENT
Start: 2025-02-27

## 2025-01-06 RX ORDER — ACETAMINOPHEN 325 MG/1
650 TABLET ORAL EVERY 6 HOURS PRN
OUTPATIENT
Start: 2025-02-27

## 2025-01-06 RX ORDER — ACETAMINOPHEN 325 MG/1
650 TABLET ORAL ONCE
OUTPATIENT
Start: 2025-02-27

## 2025-01-06 NOTE — TELEPHONE ENCOUNTER
Good morning,     Per patient's insurance  Infliximab is not a preferred product.   The preferred products are as follows: Inflectra, Renflexis, Simponi Aria.     Would provider  like to change to one of this?    Thank you,  Maria Luz

## 2025-01-06 NOTE — TELEPHONE ENCOUNTER
Therapy plan updated for Inflectra instead of Infliximab.    Dr. Romano - updated plan ready for signature    PPD Auth team - please assist with authorization.

## 2025-01-06 NOTE — TELEPHONE ENCOUNTER
Hello- would be ok with either of the Infliximab-type medications. Can we try for Inflectra (also, ok for Renflexis if that is not accepted)? Thank you.

## 2025-01-08 NOTE — TELEPHONE ENCOUNTER
Good afternoon,     Authorization has been received for infusion.  Authorization: [id number]  Valid from: 1/8/25---1/8/26      Okay to schedule patient.       Thank You,  Maria Luz

## 2025-01-08 NOTE — TELEPHONE ENCOUNTER
Prior authorization has been started via [employer].   Pending reference number is : [id number]

## 2025-01-09 DIAGNOSIS — M05.79 RHEUMATOID ARTHRITIS INVOLVING MULTIPLE SITES WITH POSITIVE RHEUMATOID FACTOR (HCC): ICD-10-CM

## 2025-01-09 DIAGNOSIS — M06.4 INFLAMMATORY POLYARTHRITIS (HCC): ICD-10-CM

## 2025-01-09 DIAGNOSIS — Z79.899 ENCOUNTER FOR LONG-TERM (CURRENT) USE OF HIGH-RISK MEDICATION: Primary | ICD-10-CM

## 2025-01-09 DIAGNOSIS — N18.9 CHRONIC KIDNEY DISEASE, UNSPECIFIED CKD STAGE: ICD-10-CM

## 2025-01-09 NOTE — PROGRESS NOTES
[facility] message sent today, 1/9/25, to patient:     \"Hello-I just wanted to check-in and see how you are feeling.  Additionally, as a reminder, we are due for lab monitoring while on methotrexate and immune system medications.  I have standing orders within the system that can be done nonfasting at your convenience.  The next set of labs will also include updated tuberculosis testing (done once yearly). Please schedule a follow-up appointment with me around March. Thank you.\"

## 2025-01-14 ENCOUNTER — TELEPHONE (OUTPATIENT)
Dept: FAMILY MEDICINE | Age: 75
End: 2025-01-14

## 2025-01-14 ENCOUNTER — E-ADVICE (OUTPATIENT)
Dept: FAMILY MEDICINE | Age: 75
End: 2025-01-14

## 2025-01-15 ENCOUNTER — PATIENT MESSAGE (OUTPATIENT)
Dept: RHEUMATOLOGY | Facility: CLINIC | Age: 75
End: 2025-01-15

## 2025-01-21 ENCOUNTER — E-ADVICE (OUTPATIENT)
Dept: FAMILY MEDICINE | Age: 75
End: 2025-01-21

## 2025-01-27 NOTE — TELEPHONE ENCOUNTER
Hello- Can we check in again and see what would be required to keep the patient on Remicade or if insurance would cover? Updated TB testing ordered. Thank you.

## 2025-01-28 ENCOUNTER — HOSPITAL ENCOUNTER (OUTPATIENT)
Dept: MAMMOGRAPHY | Facility: HOSPITAL | Age: 75
Discharge: HOME | End: 2025-01-28
Attending: FAMILY MEDICINE
Payer: MEDICARE

## 2025-01-28 DIAGNOSIS — Z12.31 ENCOUNTER FOR SCREENING MAMMOGRAM FOR MALIGNANT NEOPLASM OF BREAST: ICD-10-CM

## 2025-01-28 PROCEDURE — 77063 BREAST TOMOSYNTHESIS BI: CPT | Performed by: FAMILY MEDICINE

## 2025-01-28 PROCEDURE — 77067 SCR MAMMO BI INCL CAD: CPT | Performed by: FAMILY MEDICINE

## 2025-01-30 ENCOUNTER — TELEPHONE (OUTPATIENT)
Dept: FAMILY MEDICINE | Age: 75
End: 2025-01-30

## 2025-01-31 ENCOUNTER — LAB ENCOUNTER (OUTPATIENT)
Dept: LAB | Facility: HOSPITAL | Age: 75
End: 2025-01-31
Attending: INTERNAL MEDICINE
Payer: MEDICARE

## 2025-01-31 ENCOUNTER — EXTERNAL LAB (OUTPATIENT)
Dept: HEALTH INFORMATION MANAGEMENT | Facility: OTHER | Age: 75
End: 2025-01-31

## 2025-01-31 ENCOUNTER — TELEPHONE (OUTPATIENT)
Dept: FAMILY MEDICINE | Age: 75
End: 2025-01-31

## 2025-01-31 DIAGNOSIS — I10 BENIGN HYPERTENSION: ICD-10-CM

## 2025-01-31 DIAGNOSIS — Z79.899 ENCOUNTER FOR LONG-TERM (CURRENT) USE OF HIGH-RISK MEDICATION: ICD-10-CM

## 2025-01-31 DIAGNOSIS — E11.22: ICD-10-CM

## 2025-01-31 DIAGNOSIS — N18.9 CHRONIC KIDNEY DISEASE, UNSPECIFIED CKD STAGE: ICD-10-CM

## 2025-01-31 DIAGNOSIS — N18.30: ICD-10-CM

## 2025-01-31 DIAGNOSIS — M05.79 RHEUMATOID ARTHRITIS INVOLVING MULTIPLE SITES WITH POSITIVE RHEUMATOID FACTOR (HCC): ICD-10-CM

## 2025-01-31 DIAGNOSIS — E24.9 HYPERCORTISOLISM (HCC): Primary | ICD-10-CM

## 2025-01-31 DIAGNOSIS — Z00.00 EXAMINATION, MEDICAL, GENERAL: ICD-10-CM

## 2025-01-31 DIAGNOSIS — M06.4 INFLAMMATORY POLYARTHRITIS (HCC): ICD-10-CM

## 2025-01-31 DIAGNOSIS — N18.32 STAGE 3B CHRONIC KIDNEY DISEASE (HCC): ICD-10-CM

## 2025-01-31 LAB
ALBUMIN SERPL-MCNC: 4 G/DL (ref 3.2–4.8)
ALBUMIN SERPL-MCNC: 4 G/DL (ref 3.2–4.8)
ALBUMIN/GLOB SERPL: 0.9 {RATIO} (ref 1–2)
ALBUMIN/GLOB SERPL: 0.9 {RATIO} (ref 1–2)
ALP LIVER SERPL-CCNC: 68 U/L
ALP SERPL-CCNC: 68 U/L (ref 55–142)
ALT SERPL-CCNC: 19 U/L
ALT SERPL-CCNC: 19 U/L (ref 10–49)
ANION GAP SERPL CALC-SCNC: 7 MMOL/L (ref 0–18)
ANION GAP SERPL CALC-SCNC: 7 MMOL/L (ref 0–18)
AST SERPL-CCNC: 24 U/L
AST SERPL-CCNC: 24 U/L (ref ?–34)
BASOPHILS # BLD AUTO: 0.04 X10(3) UL (ref 0–0.2)
BASOPHILS # BLD: 0.04 X10(3) UL (ref 0–0.2)
BASOPHILS NFR BLD AUTO: 0.8 %
BASOPHILS NFR BLD: 0.8 %
BILIRUB SERPL-MCNC: 0.9 MG/DL (ref 0.2–1.1)
BILIRUB SERPL-MCNC: 0.9 MG/DL (ref 0.2–1.1)
BUN BLD-MCNC: 15 MG/DL (ref 9–23)
BUN SERPL-MCNC: 15 MG/DL (ref 9–23)
BUN/CREAT SERPL: 9.9 (ref 10–20)
BUN/CREAT SERPL: 9.9 (ref 10–20)
CALCIUM BLD-MCNC: 9.6 MG/DL (ref 8.7–10.4)
CALCIUM SERPL-MCNC: 9.6 MG/DL (ref 8.7–10.4)
CALCULATED OSMO: 294 MOSM/KG (ref 275–295)
CHLORIDE SERPL-SCNC: 105 MMOL/L (ref 98–112)
CHLORIDE SERPL-SCNC: 105 MMOL/L (ref 98–112)
CHOLEST SERPL-MCNC: 118 MG/DL
CHOLEST SERPL-MCNC: 118 MG/DL (ref ?–200)
CO2 SERPL-SCNC: 27 MMOL/L (ref 21–32)
CO2 SERPL-SCNC: 27 MMOL/L (ref 21–32)
CORTIS SERPL-MCNC: 2.6 UG/DL
CREAT BLD-MCNC: 1.52 MG/DL
CREAT SERPL-MCNC: 1.52 MG/DL (ref 0.55–1.02)
CREAT UR-MCNC: 150.2 MG/DL
CREAT UR-SCNC: 150.2 MG/DL
CRP SERPL-MCNC: <0.4 MG/DL (ref ?–1)
DEPRECATED RDW RBC AUTO: 47.5 FL (ref 35.1–46.3)
EGFRCR SERPLBLD CKD-EPI 2021: 36 ML/MIN/1.73M2 (ref 60–?)
EOSINOPHIL # BLD AUTO: 0.04 X10(3) UL (ref 0–0.7)
EOSINOPHIL # BLD: 0.04 X10(3) UL (ref 0–0.7)
EOSINOPHIL NFR BLD AUTO: 0.8 %
EOSINOPHIL NFR BLD: 0.8 %
ERYTHROCYTE [DISTWIDTH] IN BLOOD BY AUTOMATED COUNT: 13.8 % (ref 11–15)
ERYTHROCYTE [DISTWIDTH] IN BLOOD BY AUTOMATED COUNT: 47.5 FL (ref 35.1–46.3)
ERYTHROCYTE [DISTWIDTH] IN BLOOD: 13.8 % (ref 11–15)
ERYTHROCYTE [SEDIMENTATION RATE] IN BLOOD: 111 MM/HR
EST. AVERAGE GLUCOSE BLD GHB EST-MCNC: 200 MG/DL (ref 68–126)
EST. AVERAGE GLUCOSE BLD GHB EST-MCNC: 200 MG/DL (ref 68–126)
FASTING PATIENT LIPID ANSWER: YES
FASTING STATUS PATIENT QL REPORTED: YES
GFR SERPLBLD SCHWARTZ-ARVRAT: 36 ML/MIN/1.73M2
GLOBULIN PLAS-MCNC: 4.6 G/DL (ref 2–3.5)
GLOBULIN SER-MCNC: 4.6 G/DL (ref 2–3.5)
GLUCOSE BLD-MCNC: 197 MG/DL (ref 70–99)
GLUCOSE SERPL-MCNC: 197 MG/DL (ref 70–99)
HBA1C MFR BLD: 8.6 %
HBA1C MFR BLD: 8.6 % (ref ?–5.7)
HCT VFR BLD AUTO: 39.9 %
HCT VFR BLD CALC: 39.9 % (ref 35–48)
HDLC SERPL-MCNC: 48 MG/DL (ref 40–59)
HDLC SERPL-MCNC: 48 MG/DL (ref 40–59)
HGB BLD-MCNC: 13.3 G/DL
HGB BLD-MCNC: 13.3 G/DL (ref 12–16)
IMM GRANULOCYTES # BLD AUTO: 0.02 X10(3) UL (ref 0–1)
IMM GRANULOCYTES # BLD: 0.02 X10(3) UL (ref 0–1)
IMM GRANULOCYTES NFR BLD: 0.4 %
IMM GRANULOCYTES NFR BLD: 0.4 %
LDLC SERPL CALC-MCNC: 57 MG/DL
LDLC SERPL CALC-MCNC: 57 MG/DL (ref ?–100)
LENGTH OF FAST TIME PATIENT: YES H
LENGTH OF FAST TIME PATIENT: YES H
LYMPHOCYTES # BLD AUTO: 1.76 X10(3) UL (ref 1–4)
LYMPHOCYTES # BLD: 1.76 X10(3) UL (ref 1–4)
LYMPHOCYTES NFR BLD AUTO: 35.3 %
LYMPHOCYTES NFR BLD: 35.3 %
MCH RBC QN AUTO: 32 PG (ref 26–34)
MCH RBC QN AUTO: 32 PG (ref 26–34)
MCHC RBC AUTO-ENTMCNC: 33.3 G/DL (ref 31–37)
MCHC RBC AUTO-ENTMCNC: 33.3 G/DL (ref 31–37)
MCV RBC AUTO: 95.9 FL
MCV RBC AUTO: 95.9 FL (ref 80–100)
MICROALBUMIN UR-MCNC: 5.6 MG/DL
MICROALBUMIN UR-MCNC: 5.6 MG/DL
MICROALBUMIN/CREAT 24H UR-RTO: 37.3 UG/MG (ref ?–30)
MICROALBUMIN/CREAT UR: 37.3 UG/MG
MONOCYTES # BLD AUTO: 0.32 X10(3) UL (ref 0.1–1)
MONOCYTES # BLD: 0.32 X10(3) UL (ref 0.1–1)
MONOCYTES NFR BLD AUTO: 6.4 %
MONOCYTES NFR BLD: 6.4 %
NEUTROPHILS # BLD AUTO: 2.8 X10 (3) UL (ref 1.5–7.7)
NEUTROPHILS # BLD AUTO: 2.8 X10(3) UL (ref 1.5–7.7)
NEUTROPHILS # BLD: 2.8 X10(3) UL (ref 1.5–7.7)
NEUTROPHILS NFR BLD AUTO: 56.3 %
NEUTROPHILS NFR BLD: 56.3 %
NONHDLC SERPL-MCNC: 70 MG/DL
NONHDLC SERPL-MCNC: 70 MG/DL (ref ?–130)
OSMOLALITY SERPL CALC.SUM OF ELEC: 294 MOSM/KG (ref 275–295)
PHOSPHATE SERPL-MCNC: 3.5 MG/DL (ref 2.4–5.1)
PLATELET # BLD AUTO: 199 10(3)UL (ref 150–450)
PLATELET # BLD: 199 10(3)UL (ref 150–450)
POTASSIUM SERPL-SCNC: 4 MMOL/L (ref 3.5–5.1)
POTASSIUM SERPL-SCNC: 4 MMOL/L (ref 3.5–5.1)
PROT SERPL-MCNC: 8.6 G/DL (ref 5.7–8.2)
PROT SERPL-MCNC: 8.6 G/DL (ref 5.7–8.2)
RBC # BLD AUTO: 4.16 X10(6)UL
RBC # BLD: 4.16 X10(6)UL (ref 3.8–5.3)
SODIUM SERPL-SCNC: 139 MMOL/L (ref 136–145)
SODIUM SERPL-SCNC: 139 MMOL/L (ref 136–145)
TRIGL SERPL-MCNC: 59 MG/DL (ref 30–149)
TRIGL SERPL-MCNC: 59 MG/DL (ref 30–149)
TSH SERPL-ACNC: 1.44 UIU/ML (ref 0.55–4.78)
TSI SER-ACNC: 1.44 UIU/ML (ref 0.55–4.78)
VLDLC SERPL CALC-MCNC: 9 MG/DL (ref 0–30)
VLDLC SERPL CALC-MCNC: 9 MG/DL (ref 0–30)
WBC # BLD AUTO: 5 X10(3) UL (ref 4–11)
WBC # BLD: 5 X10(3) UL (ref 4–11)

## 2025-01-31 PROCEDURE — 84443 ASSAY THYROID STIM HORMONE: CPT

## 2025-01-31 PROCEDURE — 85025 COMPLETE CBC W/AUTO DIFF WBC: CPT

## 2025-01-31 PROCEDURE — 86140 C-REACTIVE PROTEIN: CPT

## 2025-01-31 PROCEDURE — 83036 HEMOGLOBIN GLYCOSYLATED A1C: CPT

## 2025-01-31 PROCEDURE — 85652 RBC SED RATE AUTOMATED: CPT

## 2025-01-31 PROCEDURE — 36415 COLL VENOUS BLD VENIPUNCTURE: CPT

## 2025-01-31 PROCEDURE — 82533 TOTAL CORTISOL: CPT

## 2025-01-31 PROCEDURE — 82043 UR ALBUMIN QUANTITATIVE: CPT

## 2025-01-31 PROCEDURE — 80299 QUANTITATIVE ASSAY DRUG: CPT

## 2025-01-31 PROCEDURE — 82570 ASSAY OF URINE CREATININE: CPT

## 2025-01-31 PROCEDURE — 80061 LIPID PANEL: CPT

## 2025-01-31 PROCEDURE — 80053 COMPREHEN METABOLIC PANEL: CPT

## 2025-01-31 PROCEDURE — 84100 ASSAY OF PHOSPHORUS: CPT

## 2025-01-31 PROCEDURE — 86480 TB TEST CELL IMMUN MEASURE: CPT

## 2025-02-02 LAB
M TB IFN-G CD4+ T-CELLS BLD-ACNC: 0.09 IU/ML
M TB TUBERC IFN-G BLD QL: NEGATIVE
M TB TUBERC IGNF/MITOGEN IGNF CONTROL: >10 IU/ML
QFT TB1 AG MINUS NIL: 0 IU/ML
QFT TB2 AG MINUS NIL: -0.02 IU/ML

## 2025-02-07 LAB — DEXAMETHASONE, SERUM: 266 NG/DL

## 2025-02-11 ENCOUNTER — TELEPHONE (OUTPATIENT)
Dept: FAMILY MEDICINE | Age: 75
End: 2025-02-11

## 2025-02-17 RX ORDER — FOLIC ACID 1 MG/1
1000 TABLET ORAL DAILY
Qty: 90 TABLET | Refills: 0 | Status: SHIPPED | OUTPATIENT
Start: 2025-02-17

## 2025-02-17 RX ORDER — LANCETS
EACH MISCELLANEOUS
Qty: 102 EACH | Refills: 0 | Status: SHIPPED | OUTPATIENT
Start: 2025-02-17

## 2025-02-24 RX ORDER — FUROSEMIDE 20 MG/1
20 TABLET ORAL DAILY
Qty: 90 TABLET | Refills: 0 | Status: SHIPPED | OUTPATIENT
Start: 2025-02-24

## 2025-02-25 DIAGNOSIS — E78.5 DYSLIPIDEMIA: ICD-10-CM

## 2025-02-25 RX ORDER — ATORVASTATIN CALCIUM 40 MG/1
TABLET, FILM COATED ORAL
Qty: 90 TABLET | Refills: 0 | Status: SHIPPED | OUTPATIENT
Start: 2025-02-25

## 2025-02-27 ENCOUNTER — TELEPHONE (OUTPATIENT)
Dept: RHEUMATOLOGY | Facility: CLINIC | Age: 75
End: 2025-02-27

## 2025-02-27 ENCOUNTER — OFFICE VISIT (OUTPATIENT)
Age: 75
End: 2025-02-27
Attending: STUDENT IN AN ORGANIZED HEALTH CARE EDUCATION/TRAINING PROGRAM
Payer: MEDICARE

## 2025-02-27 ENCOUNTER — PATIENT MESSAGE (OUTPATIENT)
Dept: RHEUMATOLOGY | Facility: CLINIC | Age: 75
End: 2025-02-27

## 2025-02-27 VITALS
WEIGHT: 293 LBS | DIASTOLIC BLOOD PRESSURE: 85 MMHG | SYSTOLIC BLOOD PRESSURE: 139 MMHG | HEART RATE: 82 BPM | BODY MASS INDEX: 50 KG/M2 | OXYGEN SATURATION: 97 % | TEMPERATURE: 99 F | RESPIRATION RATE: 18 BRPM

## 2025-02-27 DIAGNOSIS — M06.4 INFLAMMATORY POLYARTHRITIS (HCC): Primary | ICD-10-CM

## 2025-02-27 DIAGNOSIS — N18.32 STAGE 3B CHRONIC KIDNEY DISEASE (HCC): ICD-10-CM

## 2025-02-27 DIAGNOSIS — M05.79 RHEUMATOID ARTHRITIS INVOLVING MULTIPLE SITES WITH POSITIVE RHEUMATOID FACTOR (HCC): ICD-10-CM

## 2025-02-27 DIAGNOSIS — M05.79 SEROPOSITIVE RHEUMATOID ARTHRITIS OF MULTIPLE SITES (HCC): ICD-10-CM

## 2025-02-27 RX ORDER — ACETAMINOPHEN 325 MG/1
650 TABLET ORAL EVERY 6 HOURS PRN
Status: CANCELLED | OUTPATIENT
Start: 2025-04-24

## 2025-02-27 RX ORDER — ACETAMINOPHEN 325 MG/1
650 TABLET ORAL ONCE
OUTPATIENT
Start: 2025-04-10

## 2025-02-27 RX ORDER — DIPHENHYDRAMINE HYDROCHLORIDE 50 MG/ML
INJECTION INTRAMUSCULAR; INTRAVENOUS
Status: COMPLETED
Start: 2025-02-27 | End: 2025-02-27

## 2025-02-27 RX ORDER — DIPHENHYDRAMINE HYDROCHLORIDE 50 MG/ML
12.5 INJECTION INTRAMUSCULAR; INTRAVENOUS ONCE
OUTPATIENT
Start: 2025-04-10

## 2025-02-27 RX ORDER — DIPHENHYDRAMINE HYDROCHLORIDE 50 MG/ML
12.5 INJECTION INTRAMUSCULAR; INTRAVENOUS ONCE
Status: DISCONTINUED | OUTPATIENT
Start: 2025-02-27 | End: 2025-02-27

## 2025-02-27 RX ORDER — ACETAMINOPHEN 325 MG/1
650 TABLET ORAL ONCE
Status: DISCONTINUED | OUTPATIENT
Start: 2025-02-27 | End: 2025-02-27

## 2025-02-27 RX ORDER — ACETAMINOPHEN 325 MG/1
650 TABLET ORAL ONCE
Status: COMPLETED | OUTPATIENT
Start: 2025-02-27 | End: 2025-02-27

## 2025-02-27 RX ORDER — DIPHENHYDRAMINE HYDROCHLORIDE 50 MG/ML
12.5 INJECTION INTRAMUSCULAR; INTRAVENOUS ONCE
Status: COMPLETED | OUTPATIENT
Start: 2025-02-27 | End: 2025-02-27

## 2025-02-27 RX ORDER — ACETAMINOPHEN 325 MG/1
TABLET ORAL
Status: COMPLETED
Start: 2025-02-27 | End: 2025-02-27

## 2025-02-27 RX ORDER — ACETAMINOPHEN 325 MG/1
650 TABLET ORAL EVERY 6 HOURS PRN
OUTPATIENT
Start: 2025-04-10

## 2025-02-27 RX ORDER — DIPHENHYDRAMINE HYDROCHLORIDE 50 MG/ML
12.5 INJECTION INTRAMUSCULAR; INTRAVENOUS ONCE
Status: CANCELLED | OUTPATIENT
Start: 2025-04-24

## 2025-02-27 RX ORDER — ACETAMINOPHEN 325 MG/1
650 TABLET ORAL ONCE
Status: CANCELLED | OUTPATIENT
Start: 2025-04-24

## 2025-02-27 RX ADMIN — DIPHENHYDRAMINE HYDROCHLORIDE 12.5 MG: 50 INJECTION INTRAMUSCULAR; INTRAVENOUS at 12:01:00

## 2025-02-27 RX ADMIN — ACETAMINOPHEN 650 MG: 325 TABLET ORAL at 12:01:00

## 2025-02-27 NOTE — PROGRESS NOTES
Pt here for remicade-was previously ordered inflectra . Pt denies any issues or concerns.      Ordering Provider: Rafi    Premedications given as ordered.   Pt tolerated infusion without difficulty or complaint. Post vs wnl. Discharged stable. Reviewed next apt date/time: returns in 8 weeks      Education Record  Learner:  Patient  Disease / Diagnosis: RA  Barriers / Limitations:  None  Method:  Discussion  General Topics:  Plan of care reviewed  Outcome:  Shows understanding

## 2025-02-27 NOTE — TELEPHONE ENCOUNTER
Message sent to patient to schedule appointment.   
PA Approved    Prior authorization for: Remicade    Medication form: 100mg    Approval #: Case #:[id number]    Approved dates: 02/11/2025 to 02/11/2026    
Patient calling to ask to speak to Anna, stated is waiting for Remicade infusion, stated authorization can be found in Media tab, asking for call back, patient expressed frustration as stated has been waiting for 40 minutes to get infusion.   
Pt. Over due for follow up with dr. Romano - needs yearly follow up susan to address insurance changes to put in new orders -   Orders for remicade signed   She can make appt. First available  
Low Risk (score 7-11)

## 2025-03-03 RX ORDER — TERAZOSIN 10 MG/1
CAPSULE ORAL
Qty: 90 CAPSULE | Refills: 0 | Status: SHIPPED | OUTPATIENT
Start: 2025-03-03

## 2025-03-04 ENCOUNTER — TELEPHONE (OUTPATIENT)
Dept: FAMILY MEDICINE | Age: 75
End: 2025-03-04

## 2025-04-07 ENCOUNTER — E-ADVICE (OUTPATIENT)
Dept: FAMILY MEDICINE | Age: 75
End: 2025-04-07

## 2025-04-08 ENCOUNTER — TELEPHONE (OUTPATIENT)
Dept: FAMILY MEDICINE | Age: 75
End: 2025-04-08

## 2025-04-08 DIAGNOSIS — Z12.11 COLON CANCER SCREENING: Primary | ICD-10-CM

## 2025-04-21 DIAGNOSIS — E11.22 DIABETES MELLITUS WITH STAGE 3 CHRONIC KIDNEY DISEASE  (CMD): ICD-10-CM

## 2025-04-21 DIAGNOSIS — N18.30 DIABETES MELLITUS WITH STAGE 3 CHRONIC KIDNEY DISEASE  (CMD): ICD-10-CM

## 2025-04-21 RX ORDER — SEMAGLUTIDE 1.34 MG/ML
INJECTION, SOLUTION SUBCUTANEOUS
Qty: 9 ML | Refills: 0 | Status: SHIPPED | OUTPATIENT
Start: 2025-04-21

## 2025-04-24 ENCOUNTER — OFFICE VISIT (OUTPATIENT)
Age: 75
End: 2025-04-24
Attending: STUDENT IN AN ORGANIZED HEALTH CARE EDUCATION/TRAINING PROGRAM
Payer: MEDICARE

## 2025-04-24 VITALS
TEMPERATURE: 98 F | HEART RATE: 71 BPM | BODY MASS INDEX: 51 KG/M2 | DIASTOLIC BLOOD PRESSURE: 77 MMHG | RESPIRATION RATE: 18 BRPM | SYSTOLIC BLOOD PRESSURE: 141 MMHG | OXYGEN SATURATION: 98 % | WEIGHT: 293 LBS

## 2025-04-24 DIAGNOSIS — M05.79 SEROPOSITIVE RHEUMATOID ARTHRITIS OF MULTIPLE SITES (HCC): Primary | ICD-10-CM

## 2025-04-24 RX ORDER — ACETAMINOPHEN 325 MG/1
650 TABLET ORAL ONCE
Status: COMPLETED | OUTPATIENT
Start: 2025-04-24 | End: 2025-04-24

## 2025-04-24 RX ORDER — DIPHENHYDRAMINE HYDROCHLORIDE 50 MG/ML
12.5 INJECTION, SOLUTION INTRAMUSCULAR; INTRAVENOUS ONCE
Status: COMPLETED | OUTPATIENT
Start: 2025-04-24 | End: 2025-04-24

## 2025-04-24 RX ORDER — DIPHENHYDRAMINE HYDROCHLORIDE 50 MG/ML
12.5 INJECTION, SOLUTION INTRAMUSCULAR; INTRAVENOUS ONCE
OUTPATIENT
Start: 2025-06-05

## 2025-04-24 RX ORDER — ACETAMINOPHEN 325 MG/1
TABLET ORAL
Status: COMPLETED
Start: 2025-04-24 | End: 2025-04-24

## 2025-04-24 RX ORDER — ACETAMINOPHEN 325 MG/1
650 TABLET ORAL ONCE
OUTPATIENT
Start: 2025-06-05

## 2025-04-24 RX ORDER — ACETAMINOPHEN 325 MG/1
650 TABLET ORAL EVERY 6 HOURS PRN
OUTPATIENT
Start: 2025-06-05

## 2025-04-24 RX ORDER — DIPHENHYDRAMINE HYDROCHLORIDE 50 MG/ML
INJECTION, SOLUTION INTRAMUSCULAR; INTRAVENOUS
Status: COMPLETED
Start: 2025-04-24 | End: 2025-04-24

## 2025-04-24 RX ADMIN — DIPHENHYDRAMINE HYDROCHLORIDE 12.5 MG: 50 INJECTION, SOLUTION INTRAMUSCULAR; INTRAVENOUS at 10:57:00

## 2025-04-24 RX ADMIN — ACETAMINOPHEN 650 MG: 325 TABLET ORAL at 10:57:00

## 2025-04-24 NOTE — PROGRESS NOTES
Pt here for Remicade . Pt denies any issues or concerns.      Ordering Provider: Rafi Dominguez Exp: 12/31/25     Pt tolerated infusion without difficulty or complaint. Reviewed next apt date/time: 6/19 at 1030      Education Record  Learner:  Patient  Disease / Diagnosis: RA  Barriers / Limitations:  None  Method:  Brief focused and Discussion  General Topics:  Plan of care reviewed  Outcome:  Shows understanding

## 2025-04-30 ENCOUNTER — LAB SERVICES (OUTPATIENT)
Dept: LAB | Age: 75
End: 2025-04-30

## 2025-04-30 DIAGNOSIS — Z12.11 COLON CANCER SCREENING: ICD-10-CM

## 2025-04-30 PROCEDURE — 82274 ASSAY TEST FOR BLOOD FECAL: CPT | Performed by: CLINICAL MEDICAL LABORATORY

## 2025-05-02 ASSESSMENT — PATIENT HEALTH QUESTIONNAIRE - PHQ9
CLINICAL INTERPRETATION OF PHQ2 SCORE: 0
SUM OF ALL RESPONSES TO PHQ9 QUESTIONS 1 AND 2: 0
1. LITTLE INTEREST OR PLEASURE IN DOING THINGS: NOT AT ALL
CLINICAL INTERPRETATION OF PHQ2 SCORE: NO FURTHER SCREENING NEEDED
2. FEELING DOWN, DEPRESSED OR HOPELESS: NOT AT ALL

## 2025-05-03 LAB — HEMOCCULT STL QL: NEGATIVE

## 2025-05-04 ENCOUNTER — RESULTS FOLLOW-UP (OUTPATIENT)
Dept: FAMILY MEDICINE | Age: 75
End: 2025-05-04

## 2025-05-06 ENCOUNTER — APPOINTMENT (OUTPATIENT)
Dept: FAMILY MEDICINE | Age: 75
End: 2025-05-06

## 2025-05-06 VITALS
BODY MASS INDEX: 44.41 KG/M2 | HEART RATE: 86 BPM | TEMPERATURE: 98.4 F | WEIGHT: 293 LBS | OXYGEN SATURATION: 99 % | HEIGHT: 68 IN | DIASTOLIC BLOOD PRESSURE: 68 MMHG | RESPIRATION RATE: 18 BRPM | SYSTOLIC BLOOD PRESSURE: 101 MMHG

## 2025-05-06 DIAGNOSIS — N18.32 STAGE 3B CHRONIC KIDNEY DISEASE  (CMD): ICD-10-CM

## 2025-05-06 DIAGNOSIS — N18.30 DIABETES MELLITUS WITH STAGE 3 CHRONIC KIDNEY DISEASE  (CMD): ICD-10-CM

## 2025-05-06 DIAGNOSIS — Z00.00 ANNUAL PHYSICAL EXAM: ICD-10-CM

## 2025-05-06 DIAGNOSIS — Z12.11 COLON CANCER SCREENING: ICD-10-CM

## 2025-05-06 DIAGNOSIS — E78.5 HYPERLIPIDEMIA, UNSPECIFIED HYPERLIPIDEMIA TYPE: ICD-10-CM

## 2025-05-06 DIAGNOSIS — N18.30 STAGE 3 CHRONIC KIDNEY DISEASE, UNSPECIFIED WHETHER STAGE 3A OR 3B CKD  (CMD): ICD-10-CM

## 2025-05-06 DIAGNOSIS — M05.711 RHEUMATOID ARTHRITIS INVOLVING RIGHT SHOULDER WITH POSITIVE RHEUMATOID FACTOR  (CMD): ICD-10-CM

## 2025-05-06 DIAGNOSIS — G47.33 OSA ON CPAP: ICD-10-CM

## 2025-05-06 DIAGNOSIS — Z12.31 ENCOUNTER FOR SCREENING MAMMOGRAM FOR MALIGNANT NEOPLASM OF BREAST: ICD-10-CM

## 2025-05-06 DIAGNOSIS — D47.2 MGUS (MONOCLONAL GAMMOPATHY OF UNKNOWN SIGNIFICANCE): ICD-10-CM

## 2025-05-06 DIAGNOSIS — L72.9 SUBCUTANEOUS CYST: ICD-10-CM

## 2025-05-06 DIAGNOSIS — E66.01 OBESITY, MORBID, BMI 50 OR HIGHER  (CMD): ICD-10-CM

## 2025-05-06 DIAGNOSIS — Z00.00 MEDICARE ANNUAL WELLNESS VISIT, SUBSEQUENT: Primary | ICD-10-CM

## 2025-05-06 DIAGNOSIS — E11.22 DIABETES MELLITUS WITH STAGE 3 CHRONIC KIDNEY DISEASE  (CMD): ICD-10-CM

## 2025-05-06 DIAGNOSIS — I10 BENIGN ESSENTIAL HYPERTENSION: ICD-10-CM

## 2025-05-06 ASSESSMENT — MINI COG
TOTAL SCORE: 5
PATIENT WAS GIVEN REPEAT BACK WORDS FROM VERSION: 1 - BANANA SUNRISE CHAIR
PATIENT ABLE TO FILL IN THE CLOCK FACE WITH 10 MINUTES PAST 11 O'CLOCK?: YES, CLOCK IS CORRECT
PATIENT ABLE TO REPEAT THE 3 WORDS GIVEN PREVIOUSLY?: WAS ABLE TO REPEAT BACK 3 WORDS CORRECTLY

## 2025-05-06 ASSESSMENT — PAIN SCALES - GENERAL: PAINLEVEL_OUTOF10: 0

## 2025-05-12 LAB — HM DILATED EYE EXAM: NORMAL

## 2025-05-14 DIAGNOSIS — E78.5 DYSLIPIDEMIA: ICD-10-CM

## 2025-05-14 RX ORDER — FOLIC ACID 1 MG/1
1000 TABLET ORAL DAILY
Qty: 90 TABLET | Refills: 0 | Status: SHIPPED | OUTPATIENT
Start: 2025-05-14

## 2025-05-14 RX ORDER — PEN NEEDLE, DIABETIC 32GX 5/32"
NEEDLE, DISPOSABLE MISCELLANEOUS
Qty: 100 EACH | Refills: 0 | Status: SHIPPED | OUTPATIENT
Start: 2025-05-14

## 2025-05-14 RX ORDER — FUROSEMIDE 20 MG/1
20 TABLET ORAL DAILY
Qty: 90 TABLET | Refills: 0 | Status: SHIPPED | OUTPATIENT
Start: 2025-05-14

## 2025-05-14 RX ORDER — ATORVASTATIN CALCIUM 40 MG/1
40 TABLET, FILM COATED ORAL DAILY
Qty: 90 TABLET | Refills: 0 | Status: SHIPPED | OUTPATIENT
Start: 2025-05-14

## 2025-05-15 ENCOUNTER — CLINICAL DOCUMENTATION (OUTPATIENT)
Dept: FAMILY MEDICINE | Age: 75
End: 2025-05-15

## 2025-05-15 ENCOUNTER — TELEPHONE (OUTPATIENT)
Dept: FAMILY MEDICINE | Age: 75
End: 2025-05-15

## 2025-05-15 ENCOUNTER — E-ADVICE (OUTPATIENT)
Dept: FAMILY MEDICINE | Age: 75
End: 2025-05-15

## 2025-05-29 RX ORDER — OMEPRAZOLE 20 MG/1
CAPSULE, DELAYED RELEASE ORAL
Qty: 90 CAPSULE | Refills: 0 | Status: SHIPPED | OUTPATIENT
Start: 2025-05-29

## 2025-06-04 RX ORDER — TERAZOSIN 10 MG/1
10 CAPSULE ORAL DAILY
Qty: 90 CAPSULE | Refills: 0 | Status: SHIPPED | OUTPATIENT
Start: 2025-06-04

## 2025-06-05 RX ORDER — LANCETS
EACH MISCELLANEOUS
Qty: 102 EACH | Refills: 0 | Status: SHIPPED | OUTPATIENT
Start: 2025-06-05

## 2025-06-06 ENCOUNTER — TELEPHONE (OUTPATIENT)
Age: 75
End: 2025-06-06

## 2025-06-06 NOTE — TELEPHONE ENCOUNTER
Provider changed on current infusion therapy order due to Dr. Mckee leaving the practice.       Future Appointments   Date Time Provider Department Center   6/19/2025 10:30 AM [facility] INFRN 3 [facility] [place]   8/14/2025 10:30 AM [facility] INFRN 1 [facility] [place] [facility]   10/7/2025 11:00 AM [facility]

## 2025-06-19 ENCOUNTER — OFFICE VISIT (OUTPATIENT)
Age: 75
End: 2025-06-19
Attending: STUDENT IN AN ORGANIZED HEALTH CARE EDUCATION/TRAINING PROGRAM
Payer: MEDICARE

## 2025-06-19 VITALS
WEIGHT: 293 LBS | BODY MASS INDEX: 44.41 KG/M2 | HEIGHT: 68 IN | RESPIRATION RATE: 18 BRPM | TEMPERATURE: 99 F | OXYGEN SATURATION: 96 % | HEART RATE: 72 BPM | DIASTOLIC BLOOD PRESSURE: 66 MMHG | SYSTOLIC BLOOD PRESSURE: 126 MMHG

## 2025-06-19 DIAGNOSIS — M05.79 SEROPOSITIVE RHEUMATOID ARTHRITIS OF MULTIPLE SITES (HCC): Primary | ICD-10-CM

## 2025-06-19 RX ORDER — DIPHENHYDRAMINE HYDROCHLORIDE 50 MG/ML
12.5 INJECTION, SOLUTION INTRAMUSCULAR; INTRAVENOUS ONCE
Status: COMPLETED | OUTPATIENT
Start: 2025-06-19 | End: 2025-06-19

## 2025-06-19 RX ORDER — ACETAMINOPHEN 325 MG/1
TABLET ORAL
Status: COMPLETED
Start: 2025-06-19 | End: 2025-06-19

## 2025-06-19 RX ORDER — DIPHENHYDRAMINE HYDROCHLORIDE 50 MG/ML
12.5 INJECTION, SOLUTION INTRAMUSCULAR; INTRAVENOUS ONCE
OUTPATIENT
Start: 2025-08-14

## 2025-06-19 RX ORDER — DIPHENHYDRAMINE HYDROCHLORIDE 50 MG/ML
INJECTION, SOLUTION INTRAMUSCULAR; INTRAVENOUS
Status: COMPLETED
Start: 2025-06-19 | End: 2025-06-19

## 2025-06-19 RX ORDER — ACETAMINOPHEN 325 MG/1
650 TABLET ORAL EVERY 6 HOURS PRN
OUTPATIENT
Start: 2025-08-14

## 2025-06-19 RX ORDER — ACETAMINOPHEN 325 MG/1
650 TABLET ORAL ONCE
OUTPATIENT
Start: 2025-08-14

## 2025-06-19 RX ORDER — ACETAMINOPHEN 325 MG/1
650 TABLET ORAL ONCE
Status: COMPLETED | OUTPATIENT
Start: 2025-06-19 | End: 2025-06-19

## 2025-06-19 RX ADMIN — ACETAMINOPHEN 650 MG: 325 TABLET ORAL at 10:58:00

## 2025-06-19 RX ADMIN — DIPHENHYDRAMINE HYDROCHLORIDE 12.5 MG: 50 INJECTION, SOLUTION INTRAMUSCULAR; INTRAVENOUS at 11:06:00

## 2025-06-19 NOTE — PROGRESS NOTES
Pt here for Remicade . Pt denies any issues or concerns.      Ordering Provider: Rafi Dominguez Exp: 12/31/25     Pt tolerated infusion without difficulty or complaint.  Post VS Stable. Reviewed next apt date/time: 8/14    Education Record  Learner:  Patient  Disease / Diagnosis: RA  Barriers / Limitations:  None  Method:  Brief focused and Discussion  General Topics:  Plan of care reviewed  Outcome:  Shows understanding

## 2025-07-31 ENCOUNTER — LAB ENCOUNTER (OUTPATIENT)
Dept: LAB | Facility: HOSPITAL | Age: 75
End: 2025-07-31
Attending: INTERNAL MEDICINE

## 2025-07-31 ENCOUNTER — APPOINTMENT (OUTPATIENT)
Dept: LAB | Facility: HOSPITAL | Age: 75
End: 2025-07-31

## 2025-07-31 DIAGNOSIS — Z79.899 ENCOUNTER FOR LONG-TERM (CURRENT) USE OF HIGH-RISK MEDICATION: ICD-10-CM

## 2025-07-31 DIAGNOSIS — N18.9 CHRONIC KIDNEY DISEASE, UNSPECIFIED CKD STAGE: ICD-10-CM

## 2025-07-31 DIAGNOSIS — E78.5 HYPERLIPEMIA: ICD-10-CM

## 2025-07-31 DIAGNOSIS — M06.4 INFLAMMATORY POLYARTHRITIS (HCC): ICD-10-CM

## 2025-07-31 DIAGNOSIS — M05.79 RHEUMATOID ARTHRITIS INVOLVING MULTIPLE SITES WITH POSITIVE RHEUMATOID FACTOR (HCC): ICD-10-CM

## 2025-07-31 DIAGNOSIS — E78.2 MIXED HYPERLIPIDEMIA: ICD-10-CM

## 2025-07-31 DIAGNOSIS — N18.32 STAGE 3B CHRONIC KIDNEY DISEASE (HCC): ICD-10-CM

## 2025-07-31 DIAGNOSIS — Z00.00 ROUTINE GENERAL MEDICAL EXAMINATION AT A HEALTH CARE FACILITY: Primary | ICD-10-CM

## 2025-07-31 DIAGNOSIS — E11.40 DIABETIC NEUROPATHY WITH NEUROLOGIC COMPLICATION (HCC): ICD-10-CM

## 2025-07-31 DIAGNOSIS — E11.49 DIABETIC NEUROPATHY WITH NEUROLOGIC COMPLICATION (HCC): ICD-10-CM

## 2025-07-31 LAB
ALBUMIN SERPL-MCNC: 4 G/DL (ref 3.2–4.8)
ALBUMIN/GLOB SERPL: 1 (ref 1–2)
ALP LIVER SERPL-CCNC: 67 U/L (ref 55–142)
ALT SERPL-CCNC: 21 U/L (ref 10–49)
ANION GAP SERPL CALC-SCNC: 7 MMOL/L (ref 0–18)
AST SERPL-CCNC: 26 U/L (ref ?–34)
BASOPHILS # BLD AUTO: 0.05 X10(3) UL (ref 0–0.2)
BASOPHILS NFR BLD AUTO: 0.8 %
BILIRUB SERPL-MCNC: 0.7 MG/DL (ref 0.2–1.1)
BUN BLD-MCNC: 19 MG/DL (ref 9–23)
BUN/CREAT SERPL: 11 (ref 10–20)
CALCIUM BLD-MCNC: 9.3 MG/DL (ref 8.7–10.4)
CHLORIDE SERPL-SCNC: 100 MMOL/L (ref 98–112)
CHOLEST SERPL-MCNC: 112 MG/DL (ref ?–200)
CO2 SERPL-SCNC: 28 MMOL/L (ref 21–32)
CREAT BLD-MCNC: 1.72 MG/DL (ref 0.55–1.02)
CREAT UR-SCNC: 144.1 MG/DL
CRP SERPL-MCNC: <0.5 MG/DL (ref ?–0.5)
DEPRECATED RDW RBC AUTO: 49.4 FL (ref 35.1–46.3)
EGFRCR SERPLBLD CKD-EPI 2021: 31 ML/MIN/1.73M2 (ref 60–?)
EOSINOPHIL # BLD AUTO: 0.27 X10(3) UL (ref 0–0.7)
EOSINOPHIL NFR BLD AUTO: 4.1 %
ERYTHROCYTE [DISTWIDTH] IN BLOOD BY AUTOMATED COUNT: 14 % (ref 11–15)
ERYTHROCYTE [SEDIMENTATION RATE] IN BLOOD: 57 MM/HR (ref 0–30)
EST. AVERAGE GLUCOSE BLD GHB EST-MCNC: 163 MG/DL (ref 68–126)
FASTING PATIENT LIPID ANSWER: YES
FASTING STATUS PATIENT QL REPORTED: YES
GLOBULIN PLAS-MCNC: 4.2 G/DL (ref 2–3.5)
GLUCOSE BLD-MCNC: 129 MG/DL (ref 70–99)
HBA1C MFR BLD: 7.3 % (ref ?–5.7)
HCT VFR BLD AUTO: 40.3 % (ref 35–48)
HDLC SERPL-MCNC: 46 MG/DL (ref 40–59)
HGB BLD-MCNC: 13.1 G/DL (ref 12–16)
IMM GRANULOCYTES # BLD AUTO: 0.02 X10(3) UL (ref 0–1)
IMM GRANULOCYTES NFR BLD: 0.3 %
LDLC SERPL CALC-MCNC: 49 MG/DL (ref ?–100)
LYMPHOCYTES # BLD AUTO: 3 X10(3) UL (ref 1–4)
LYMPHOCYTES NFR BLD AUTO: 45.2 %
MCH RBC QN AUTO: 31.3 PG (ref 26–34)
MCHC RBC AUTO-ENTMCNC: 32.5 G/DL (ref 31–37)
MCV RBC AUTO: 96.2 FL (ref 80–100)
MICROALBUMIN UR-MCNC: 0.3 MG/DL
MICROALBUMIN/CREAT 24H UR-RTO: 2.1 UG/MG (ref ?–30)
MONOCYTES # BLD AUTO: 0.6 X10(3) UL (ref 0.1–1)
MONOCYTES NFR BLD AUTO: 9 %
NEUTROPHILS # BLD AUTO: 2.7 X10 (3) UL (ref 1.5–7.7)
NEUTROPHILS # BLD AUTO: 2.7 X10(3) UL (ref 1.5–7.7)
NEUTROPHILS NFR BLD AUTO: 40.6 %
NONHDLC SERPL-MCNC: 66 MG/DL (ref ?–130)
OSMOLALITY SERPL CALC.SUM OF ELEC: 284 MOSM/KG (ref 275–295)
PHOSPHATE SERPL-MCNC: 3.3 MG/DL (ref 2.4–5.1)
PLATELET # BLD AUTO: 200 10(3)UL (ref 150–450)
POTASSIUM SERPL-SCNC: 3.9 MMOL/L (ref 3.5–5.1)
PROT SERPL-MCNC: 8.2 G/DL (ref 5.7–8.2)
RBC # BLD AUTO: 4.19 X10(6)UL (ref 3.8–5.3)
SODIUM SERPL-SCNC: 135 MMOL/L (ref 136–145)
TRIGL SERPL-MCNC: 90 MG/DL (ref 30–149)
TSI SER-ACNC: 2.94 UIU/ML (ref 0.55–4.78)
VLDLC SERPL CALC-MCNC: 13 MG/DL (ref 0–30)
WBC # BLD AUTO: 6.6 X10(3) UL (ref 4–11)

## 2025-07-31 PROCEDURE — 83036 HEMOGLOBIN GLYCOSYLATED A1C: CPT

## 2025-07-31 PROCEDURE — 82043 UR ALBUMIN QUANTITATIVE: CPT

## 2025-07-31 PROCEDURE — 86140 C-REACTIVE PROTEIN: CPT

## 2025-07-31 PROCEDURE — 85652 RBC SED RATE AUTOMATED: CPT

## 2025-07-31 PROCEDURE — 82570 ASSAY OF URINE CREATININE: CPT

## 2025-07-31 PROCEDURE — 80053 COMPREHEN METABOLIC PANEL: CPT

## 2025-07-31 PROCEDURE — 80061 LIPID PANEL: CPT

## 2025-07-31 PROCEDURE — 85025 COMPLETE CBC W/AUTO DIFF WBC: CPT

## 2025-07-31 PROCEDURE — 84100 ASSAY OF PHOSPHORUS: CPT

## 2025-07-31 PROCEDURE — 84443 ASSAY THYROID STIM HORMONE: CPT

## 2025-07-31 PROCEDURE — 36415 COLL VENOUS BLD VENIPUNCTURE: CPT

## 2025-08-02 ENCOUNTER — RESULTS FOLLOW-UP (OUTPATIENT)
Dept: LAB | Facility: HOSPITAL | Age: 75
End: 2025-08-02

## 2025-08-04 PROBLEM — E66.01 SEVERE OBESITY (BMI >= 40)  (CMD): Chronic | Status: ACTIVE | Noted: 2021-08-06

## 2025-08-04 PROBLEM — E11.65 TYPE 2 DIABETES MELLITUS WITH HYPERGLYCEMIA  (CMD): Status: ACTIVE | Noted: 2025-08-04

## 2025-08-04 PROBLEM — E24.9: Status: ACTIVE | Noted: 2025-08-04

## 2025-08-04 PROBLEM — E11.65 UNCONTROLLED TYPE 2 DIABETES MELLITUS WITH HYPERGLYCEMIA (CMD): Status: ACTIVE | Noted: 2025-08-04

## 2025-08-13 DIAGNOSIS — E78.5 DYSLIPIDEMIA: ICD-10-CM

## 2025-08-13 RX ORDER — FUROSEMIDE 20 MG/1
20 TABLET ORAL DAILY
Qty: 90 TABLET | Refills: 0 | Status: SHIPPED | OUTPATIENT
Start: 2025-08-13

## 2025-08-13 RX ORDER — FOLIC ACID 1 MG/1
1000 TABLET ORAL DAILY
Qty: 90 TABLET | Refills: 0 | Status: SHIPPED | OUTPATIENT
Start: 2025-08-13

## 2025-08-13 RX ORDER — ATORVASTATIN CALCIUM 40 MG/1
40 TABLET, FILM COATED ORAL DAILY
Qty: 90 TABLET | Refills: 0 | Status: SHIPPED | OUTPATIENT
Start: 2025-08-13

## 2025-08-14 ENCOUNTER — OFFICE VISIT (OUTPATIENT)
Facility: LOCATION | Age: 75
End: 2025-08-14
Attending: STUDENT IN AN ORGANIZED HEALTH CARE EDUCATION/TRAINING PROGRAM

## 2025-08-14 VITALS
WEIGHT: 293 LBS | HEART RATE: 81 BPM | TEMPERATURE: 98 F | SYSTOLIC BLOOD PRESSURE: 108 MMHG | DIASTOLIC BLOOD PRESSURE: 65 MMHG | RESPIRATION RATE: 18 BRPM | OXYGEN SATURATION: 95 % | BODY MASS INDEX: 50 KG/M2

## 2025-08-14 DIAGNOSIS — M05.79 SEROPOSITIVE RHEUMATOID ARTHRITIS OF MULTIPLE SITES (HCC): Primary | ICD-10-CM

## 2025-08-14 RX ORDER — DIPHENHYDRAMINE HYDROCHLORIDE 50 MG/ML
12.5 INJECTION, SOLUTION INTRAMUSCULAR; INTRAVENOUS ONCE
Status: COMPLETED | OUTPATIENT
Start: 2025-08-14 | End: 2025-08-14

## 2025-08-14 RX ORDER — ACETAMINOPHEN 325 MG/1
650 TABLET ORAL ONCE
OUTPATIENT
Start: 2025-10-09

## 2025-08-14 RX ORDER — ACETAMINOPHEN 325 MG/1
TABLET ORAL
Status: COMPLETED
Start: 2025-08-14 | End: 2025-08-14

## 2025-08-14 RX ORDER — ACETAMINOPHEN 325 MG/1
650 TABLET ORAL EVERY 6 HOURS PRN
OUTPATIENT
Start: 2025-10-09

## 2025-08-14 RX ORDER — DIPHENHYDRAMINE HYDROCHLORIDE 50 MG/ML
12.5 INJECTION, SOLUTION INTRAMUSCULAR; INTRAVENOUS ONCE
OUTPATIENT
Start: 2025-10-09

## 2025-08-14 RX ORDER — ACETAMINOPHEN 325 MG/1
650 TABLET ORAL ONCE
Status: COMPLETED | OUTPATIENT
Start: 2025-08-14 | End: 2025-08-14

## 2025-08-14 RX ORDER — DIPHENHYDRAMINE HYDROCHLORIDE 50 MG/ML
INJECTION, SOLUTION INTRAMUSCULAR; INTRAVENOUS
Status: COMPLETED
Start: 2025-08-14 | End: 2025-08-14

## 2025-08-14 RX ADMIN — ACETAMINOPHEN 650 MG: 325 TABLET ORAL at 11:02:00

## 2025-08-14 RX ADMIN — DIPHENHYDRAMINE HYDROCHLORIDE 12.5 MG: 50 INJECTION, SOLUTION INTRAMUSCULAR; INTRAVENOUS at 11:03:00

## 2025-08-21 RX ORDER — PEN NEEDLE, DIABETIC 32GX 5/32"
NEEDLE, DISPOSABLE MISCELLANEOUS
Qty: 100 EACH | Refills: 0 | Status: SHIPPED | OUTPATIENT
Start: 2025-08-21

## 2025-09-01 RX ORDER — TERAZOSIN 10 MG/1
10 CAPSULE ORAL DAILY
Qty: 90 CAPSULE | Refills: 0 | Status: SHIPPED | OUTPATIENT
Start: 2025-09-01

## (undated) DEVICE — Device

## (undated) DEVICE — LAWSON - KIT DX FIBERTAK DISP

## (undated) DEVICE — LAWSON - GOWN SURG STD XL STL DISP

## (undated) DEVICE — LAWSON - DRAPE UTILITY SPLIT 120X77IN

## (undated) DEVICE — IMPLANTABLE DEVICE
Type: IMPLANTABLE DEVICE | Site: ELBOW | Status: NON-FUNCTIONAL
Removed: 2022-02-17

## (undated) NOTE — LETTER
No referring provider defined for this encounter.        11/20/19        Patient: Kavita Graves   YOB: 1950   Date of Visit: 11/20/2019       Dear  Dr. Rafael Ley,     As you know she is a 61-year-old -American female with a h patient. If you have any questions please feel free to call. Thank you for referring Stefanie Damir to my practice. Please find my assessment and plan below.                    Sincerely,   Raúl Pascual MD   [facility]  [address] [facility], [address]

## (undated) NOTE — LETTER
No referring provider defined for this encounter. 10/28/22        Patient: Deonte Arizmendi   YOB: 1950   Date of Visit: 10/28/2022       Dear  Dr. Juliann Myers MD,      Thank you for referring Deonte Arizmendi to my practice. Please find my assessment and plan below. As you know she is a 74-year-old -American female with a history of adult onset diabetes mellitus, rheumatoid arthritis, anemia chronic disease, obesity, MGUS followed by hematology, hypercholesterolemia, sleep apnea and hypertension who I now had the pleasure of seeing for follow-up of chronic kidney disease stage III. Last seen in November 2021. Patient fell on the ice earlier this year and fractured her right elbow. Required surgery. Still try to get back to her range of motion. Rheumatoid arthritis seems to be under adequate control. Is try to get back to water aerobics. Has lower extremity edema which is better when she awakens in the morning. No chest pain, shortness of breath, GI or urinary tract symptoms. Physical exam her blood pressure is 144/87 with a pulse of 74 and she weighed 342 pounds. Her neck was supple without JVD. Lungs are clear. Heart revealed a regular rate and rhythm with an S4 but no gallops, murmurs or rubs. Abdomen was soft, flat, nontender without organomegaly, masses or bruits. Extremities revealed trace edema bilaterally. I reviewed her most recent labs done on October 18, 2022. Creatinine overall stable at 1.48 with an estimated GFR of 38 cc/min. Electrolytes were good. Hemoglobin 13.5. Last A1c was 7.2. I therefore reassured the patient that overall her renal function is stable. Reinforced importance of maintaining adequate hydration. Avoid nonsteroidals. Blood pressures may be borderline high. Check blood pressures daily and call me in a week. Low-salt diet. Work on weight reduction. She will continue to do CBC and renal panels quarterly.   I will see her again in 6 months for follow-up or sooner if clinically indicated. Thank you again for allowing me to participate in the care of your patient. If you have any questions please feel free to call.            Sincerely,   Anshu Snider MD   [facility]  [address] [facility], [address] [place] [address]  [address] [facility] [address] [id number] [facility] [id number]    Document electronically generated by:  Anshu Snider MD

## (undated) NOTE — LETTER
9/10/2018              [address] [facility] [address]         Dear Karime Souza,    [facility] records indicate that the blood tests ordered for you by Deon Davidson MD  have not been done.   If you have, in fact, already completed t

## (undated) NOTE — LETTER
No referring provider defined for this encounter. 12/15/17        Patient: Suzette Leiva   YOB: 1950   Date of Visit: 12/13/2017       Dear  Dr. Alfa Watson,      Thank you for referring Suzette Leiva to my practice.   Please find my as 6.7.  Sed rate was mildly elevated at 35 but her C-reactive protein was normal.  A urine microalbumin to creatinine ratio was normal as well. The patient is a 82-year-old female who appears to have chronic kidney disease stage III.   She does not have pr

## (undated) NOTE — LETTER
[address] [facility]    Consent for Coronary CT Angiography    Your doctor has recommended that you have a Coronary CT Angiography procedure.  Coronary CT Angiography is a diagnostic procedure using computed tomography to scan the can range from very minor to very serious leading to a life threatening situation or even death. Please be sure to communicate any allergy you may have to your caregiver immediately.     The information that is obtained during your testing will be treated a

## (undated) NOTE — LETTER
No referring provider defined for this encounter.       06/13/24        Patient: Fatimah Coffey   YOB: 1950   Date of Visit: 6/12/2024       Dear  Dr. Eric MD,      Thank you for referring Fatimah Coffey to my practice.  Please find my assessment and plan below.      As you know she is a 73-year-old female with a history of adult onset diabetes mellitus followed by Dr. Rodríguez, rheumatoid arthritis, anemia chronic disease, obesity, MGUS followed by hematology, hypercholesterolemia, sleep apnea and hypertension who I now had the pleasure of seeing for follow-up of chronic kidney disease stage III.    Overall the patient states she is feeling okay without any chest pain, shortness of breath, GI or urinary tract symptoms but she knows that her blood sugars have been running too high.  She has been having some difficulty getting some of her medications.  Now on Ozempic.  Amlodipine was recently added for blood pressure control.       On physical exam her blood pressure is 145/91 with a pulse of 85 and she weighed 334 pounds.  Her neck was supple without JVD.  Lungs were clear.  Heart revealed a regular rate and rhythm with an S4 but no gallops, murmurs or rubs.  Abdomen was soft, flat, nontender without organomegaly, masses or bruits.  Extremities revealed o edema.     I reviewed her most recent labs done on May 6, 2024.  Creatinine is creeping up to 1.64 now with an estimated GFR 33 cc/min.  Hemoglobin 13.8.  Albumin 3.8.  Urine microalbumin to creatinine ratio was normal at 25 but her A1c was 10.2.    I therefore informed the patient that there has been some deterioration in renal function.  Reinforced the importance of both blood pressure and blood sugar control.  She will continue to work with Dr. Rodríguez.  Check blood pressures daily and call me in 1 week.  Adjust blood pressure medications if indicated.  Maintain adequate hydration.  Avoid nonsteroidals.  Repeat CBC and renal panel in  2 months.  Will see again in 6 months or sooner if clinically indicated.    Thank you again for allowing me to participate in the care of your patient.  If you have any questions please feel free to call.             Sincerely,   Matthew Bassett MD   [facility], [place], [facility], [place]  [address] [facility] [address]  [facility] [address]    Document electronically generated by:  Matthew Bassett MD

## (undated) NOTE — LETTER
1/24/2020              [address] [facility] [id number]         Dear Delfin Gaston,    Our records indicate that the labs ordered for you by Milagro Sung MD  have not been done.   If you have, in fact, already completed the test

## (undated) NOTE — LETTER
No referring provider defined for this encounter. 12/11/18        Patient: Alana Newman   YOB: 1950   Date of Visit: 12/11/2018       Dear  Dr. Jhoana Alfred,      Thank you for referring Alana Newman to my practice.   Please find my as you are retiring and she will be transferring her care to our institution. Names and phone numbers were given. Thank you again for allowing me to participate in the care of your patient. If you have any questions please feel free to call.            Si

## (undated) NOTE — LETTER
[address] [facility]    Consent for Coronary CT Angiography    Your doctor has recommended that you have a Coronary CT Angiography procedure.  Coronary CT Angiography is a diagnostic procedure using computed tomography to scan the can range from very minor to very serious leading to a life threatening situation or even death. Please be sure to communicate any allergy you may have to your caregiver immediately.     The information that is obtained during your testing will be treated a

## (undated) NOTE — LETTER
No referring provider defined for this encounter. 10/04/23        Patient: Ajith Calvillo   YOB: 1950   Date of Visit: 10/4/2023       Dear  Dr. Malik Singh MD,      Thank you for referring Ajith Calvillo to my practice. Please find my assessment and plan below. As you know she is a 51-year-old female with a history of adult onset diabetes mellitus followed by Dr. Zoran Franks, rheumatoid arthritis, anemia of chronic disease, obesity, MGUS followed by hematology, hypercholesterolemia, sleep apnea and hypertension who I now had the pleasure of seeing for follow-up of chronic kidney disease stage III. Last seen in October 2022. He has not been compliant with follow-up labs. She reports though that she injured her foot in June 2023. Had a chronic foot ulcer which slowly resolved. Was just discharged from the wound clinic. As result she had a stop water aerobics. Having significant problems with peripheral neuropathy. Rheumatoid arthritis under good control with Remicade. On physical exam her blood pressure 123/80 with a pulse of 82 and she weighed 343 pounds. Her neck was supple without JVD. Lungs were clear. Heart revealed a regular rate and rhythm with an S4 but no gallops, murmurs or rubs. Abdomen was soft, flat, nontender without organomegaly, masses or bruits. Extremities revealed no edema. I reviewed her recent laboratory studies. October 2022 her creatinine is 1.48. Was 1.43 in June 2023 and now most recently in September 11, 2023 creatinine a bit higher at 1.53 with an estimated GFR 36 cc/min. Electrolytes were good. Hemoglobin 14.2 but A1c was 8.4. I therefore informed the patient that her kidney function has deteriorated slightly. Reinforced the importance of blood pressure and blood sugar control. Blood pressure is good today. Continue to work on getting her A1c under better control. Maintain adequate hydration. Avoid nonsteroidals.   Repeat CBC and renal panel quarterly. I will see her again in 6 months for follow-up or sooner if clinically indicated. Thank you again for allowing me to participate in the care of your patient. If you have any questions please feel free to call.            Sincerely,   Reji Garcia MD   [facility], [address] [place] [address]  [address] [place] [address]  [address] [facility] [address] [address]    Document electronically generated by:  Reji Garcia MD

## (undated) NOTE — LETTER
No referring provider defined for this encounter. 11/02/21        Patient: Virginia Mackay   YOB: 1950   Date of Visit: 11/2/2021       Dear  Dr. Rasheed Sprague MD,      Thank you for referring Virginia Mackay to my practice.   Please find kappa/lambda ratio was mildly elevated at 1.75. I therefore reassured the patient that overall her renal function was stable. I last had laboratory studies done on her back in February 2020 when her creatinine was 1.57 with an estimated GFR 39 cc/min.  R